# Patient Record
Sex: FEMALE | Race: WHITE | NOT HISPANIC OR LATINO | Employment: OTHER | URBAN - METROPOLITAN AREA
[De-identification: names, ages, dates, MRNs, and addresses within clinical notes are randomized per-mention and may not be internally consistent; named-entity substitution may affect disease eponyms.]

---

## 2017-11-01 ENCOUNTER — APPOINTMENT (OUTPATIENT)
Dept: WOMENS IMAGING | Facility: HOSPITAL | Age: 62
End: 2017-11-01

## 2017-11-01 PROCEDURE — 77067 SCR MAMMO BI INCL CAD: CPT | Performed by: RADIOLOGY

## 2018-12-07 ENCOUNTER — APPOINTMENT (OUTPATIENT)
Dept: WOMENS IMAGING | Facility: HOSPITAL | Age: 63
End: 2018-12-07

## 2018-12-07 PROCEDURE — 77067 SCR MAMMO BI INCL CAD: CPT | Performed by: RADIOLOGY

## 2020-06-02 ENCOUNTER — APPOINTMENT (OUTPATIENT)
Dept: WOMENS IMAGING | Facility: HOSPITAL | Age: 65
End: 2020-06-02

## 2020-06-02 PROCEDURE — 77067 SCR MAMMO BI INCL CAD: CPT | Performed by: RADIOLOGY

## 2020-12-17 ENCOUNTER — OFFICE VISIT (OUTPATIENT)
Dept: ENDOCRINOLOGY | Facility: CLINIC | Age: 65
End: 2020-12-17

## 2020-12-17 ENCOUNTER — TELEPHONE (OUTPATIENT)
Dept: ENDOCRINOLOGY | Facility: CLINIC | Age: 65
End: 2020-12-17

## 2020-12-17 ENCOUNTER — LAB (OUTPATIENT)
Dept: LAB | Facility: HOSPITAL | Age: 65
End: 2020-12-17

## 2020-12-17 VITALS
SYSTOLIC BLOOD PRESSURE: 110 MMHG | HEART RATE: 56 BPM | DIASTOLIC BLOOD PRESSURE: 64 MMHG | WEIGHT: 136.6 LBS | BODY MASS INDEX: 26.82 KG/M2 | HEIGHT: 60 IN

## 2020-12-17 DIAGNOSIS — E03.9 ACQUIRED HYPOTHYROIDISM: ICD-10-CM

## 2020-12-17 DIAGNOSIS — E11.649 HYPOGLYCEMIA ASSOCIATED WITH TYPE 2 DIABETES MELLITUS (HCC): ICD-10-CM

## 2020-12-17 DIAGNOSIS — IMO0002 DIABETES MELLITUS TYPE 2, UNCONTROLLED, WITH COMPLICATIONS: Primary | ICD-10-CM

## 2020-12-17 DIAGNOSIS — IMO0002 DIABETES MELLITUS TYPE 2, UNCONTROLLED, WITH COMPLICATIONS: ICD-10-CM

## 2020-12-17 PROBLEM — E78.00 HYPERCHOLESTEREMIA: Status: ACTIVE | Noted: 2020-12-17

## 2020-12-17 LAB
ALBUMIN SERPL-MCNC: 4.1 G/DL (ref 3.5–5.2)
ALBUMIN/GLOB SERPL: 1.4 G/DL
ALP SERPL-CCNC: 52 U/L (ref 39–117)
ALT SERPL W P-5'-P-CCNC: 13 U/L (ref 1–33)
ANION GAP SERPL CALCULATED.3IONS-SCNC: 8.7 MMOL/L (ref 5–15)
AST SERPL-CCNC: 16 U/L (ref 1–32)
BILIRUB SERPL-MCNC: 0.5 MG/DL (ref 0–1.2)
BUN SERPL-MCNC: 19 MG/DL (ref 8–23)
BUN/CREAT SERPL: 18.8 (ref 7–25)
CALCIUM SPEC-SCNC: 9.4 MG/DL (ref 8.6–10.5)
CHLORIDE SERPL-SCNC: 103 MMOL/L (ref 98–107)
CO2 SERPL-SCNC: 29.3 MMOL/L (ref 22–29)
CREAT SERPL-MCNC: 1.01 MG/DL (ref 0.57–1)
EXPIRATION DATE: NORMAL
GFR SERPL CREATININE-BSD FRML MDRD: 55 ML/MIN/1.73
GLOBULIN UR ELPH-MCNC: 2.9 GM/DL
GLUCOSE SERPL-MCNC: 169 MG/DL (ref 65–99)
HBA1C MFR BLD: 9.5 %
Lab: NORMAL
POTASSIUM SERPL-SCNC: 4.8 MMOL/L (ref 3.5–5.2)
PROT SERPL-MCNC: 7 G/DL (ref 6–8.5)
SODIUM SERPL-SCNC: 141 MMOL/L (ref 136–145)
TSH SERPL DL<=0.05 MIU/L-ACNC: 0.19 UIU/ML (ref 0.27–4.2)

## 2020-12-17 PROCEDURE — 82043 UR ALBUMIN QUANTITATIVE: CPT

## 2020-12-17 PROCEDURE — 83036 HEMOGLOBIN GLYCOSYLATED A1C: CPT | Performed by: INTERNAL MEDICINE

## 2020-12-17 PROCEDURE — 99214 OFFICE O/P EST MOD 30 MIN: CPT | Performed by: INTERNAL MEDICINE

## 2020-12-17 PROCEDURE — 80053 COMPREHEN METABOLIC PANEL: CPT

## 2020-12-17 PROCEDURE — 82570 ASSAY OF URINE CREATININE: CPT

## 2020-12-17 PROCEDURE — 84443 ASSAY THYROID STIM HORMONE: CPT

## 2020-12-17 RX ORDER — FLUCONAZOLE 150 MG/1
150 TABLET ORAL ONCE AS NEEDED
COMMUNITY
Start: 2020-12-08

## 2020-12-17 RX ORDER — BISOPROLOL FUMARATE 5 MG/1
TABLET, FILM COATED ORAL DAILY
COMMUNITY
Start: 2020-12-01 | End: 2021-10-18 | Stop reason: SDUPTHER

## 2020-12-17 RX ORDER — FENOFIBRATE 145 MG/1
145 TABLET, COATED ORAL DAILY
COMMUNITY
Start: 2020-12-08

## 2020-12-17 RX ORDER — ONDANSETRON 4 MG/1
4 TABLET, FILM COATED ORAL EVERY 8 HOURS PRN
COMMUNITY
End: 2022-02-24

## 2020-12-17 RX ORDER — RANOLAZINE 500 MG/1
TABLET, EXTENDED RELEASE ORAL 2 TIMES DAILY
COMMUNITY
Start: 2014-03-13 | End: 2021-10-18 | Stop reason: SDUPTHER

## 2020-12-17 RX ORDER — ATORVASTATIN CALCIUM 10 MG/1
10 TABLET, FILM COATED ORAL NIGHTLY
COMMUNITY
Start: 2020-10-17 | End: 2022-05-20 | Stop reason: SDUPTHER

## 2020-12-17 RX ORDER — PANTOPRAZOLE SODIUM 40 MG/1
40 TABLET, DELAYED RELEASE ORAL DAILY
COMMUNITY
Start: 2020-09-26 | End: 2022-02-15 | Stop reason: ALTCHOICE

## 2020-12-17 RX ORDER — GABAPENTIN 300 MG/1
CAPSULE ORAL 2 TIMES DAILY
COMMUNITY
Start: 2020-11-24 | End: 2021-10-18 | Stop reason: SDUPTHER

## 2020-12-17 RX ORDER — INSULIN GLARGINE 300 U/ML
14 INJECTION, SOLUTION SUBCUTANEOUS DAILY
COMMUNITY
End: 2020-12-17 | Stop reason: SDUPTHER

## 2020-12-17 RX ORDER — CETIRIZINE HYDROCHLORIDE 10 MG/1
10 TABLET ORAL DAILY
COMMUNITY
Start: 2014-03-13 | End: 2022-04-06

## 2020-12-17 RX ORDER — INSULIN GLARGINE 300 U/ML
14 INJECTION, SOLUTION SUBCUTANEOUS DAILY
Qty: 5 ML | Refills: 1 | Status: SHIPPED | OUTPATIENT
Start: 2020-12-17 | End: 2021-03-17 | Stop reason: SDUPTHER

## 2020-12-17 RX ORDER — FERROUS SULFATE 300 MG/5ML
300 LIQUID (ML) ORAL
COMMUNITY

## 2020-12-17 RX ORDER — LEVOTHYROXINE SODIUM 125 MCG
TABLET ORAL DAILY
COMMUNITY
Start: 2020-10-15 | End: 2021-03-17

## 2020-12-17 RX ORDER — EZETIMIBE 10 MG/1
10 TABLET ORAL DAILY
COMMUNITY
Start: 2020-10-17 | End: 2022-05-20 | Stop reason: SDUPTHER

## 2020-12-17 NOTE — TELEPHONE ENCOUNTER
Pt states we need to do a PA on toujeo.  I dont see a denial so we will send it again to get the denial to start process    Last visit today

## 2020-12-17 NOTE — ASSESSMENT & PLAN NOTE
A new problem since the addition of insulin. I dont want to reduce insulin since her a1c is still high but I just cannot increase her insulin

## 2020-12-17 NOTE — PROGRESS NOTES
"     Office Note      Date: 2020  Patient Name: Shelley Leach  MRN: [id number]  : 1955    Chief Complaint   Patient presents with   • Diabetes       History of Present Illness:   Shelley Leach is a 65 y.o. female who presents for Diabetes - type 2  Duration - 20 years  Severity- difficult to control  Context- started insulin last time and is now on 14 units   Last A1c:11.5  Associated conditions- high cholesterol and hypothyroidism on meds    Changes in health since last visit: none . Last eye exam  About due    She checkes bg -2  Times per day. She has occasional hypos.    Subjective              Review of Systems:   Review of Systems   Constitutional: Negative.    HENT: Positive for rhinorrhea.    Eyes: Positive for photophobia and itching.   Endocrine: Positive for cold intolerance.        Hair loss   Genitourinary: Positive for dyspareunia and frequency.   Musculoskeletal: Negative.    Skin: Negative.    Allergic/Immunologic: Negative.    Neurological: Negative.    Hematological: Negative.    Psychiatric/Behavioral: Negative.        The following portions of the patient's history were reviewed and updated as appropriate: allergies, current medications, past family history, past medical history, past social history, past surgical history and problem list.    Objective     Visit Vitals  /64 (BP Location: Right arm, Patient Position: Sitting, Cuff Size: Adult)   Pulse 56   Ht 152.4 cm (60\")   Wt 62 kg (136 lb 9.6 oz)   BMI 26.68 kg/m²       Labs:    CMP  Lab Results   Component Value Date    CREATININE 1.7 (H) 2015        CBC w/DIFF  Physical Exam:  Physical Exam  Constitutional:       Appearance: Normal appearance.   HENT:      Head: Normocephalic.   Neck:      Musculoskeletal: Normal range of motion and neck supple.   Lymphadenopathy:      Cervical: No cervical adenopathy.   Neurological:      General: No focal deficit present.      Mental Status: She is alert. Mental status is at " baseline.   Psychiatric:         Mood and Affect: Mood normal.         Thought Content: Thought content normal.         Judgment: Judgment normal.          Assessment / Plan      Assessment & Plan:  Problem List Items Addressed This Visit        Endocrine    Diabetes mellitus type 2, uncontrolled, with complications (CMS/[facility]) - Primary    Current Assessment & Plan      a1c is better but diabetes remains uncontrolled.  Will ask her to check bg after supper          Relevant Medications    Insulin Glargine, 1 Unit Dial, (Toujeo SoloStar) 300 UNIT/ML solution pen-injector injection    Other Relevant Orders    POC Glycosylated Hemoglobin (Hb A1C) (Completed)    Comprehensive Metabolic Panel    Microalbumin / Creatinine Urine Ratio - Urine, Clean Catch    Acquired hypothyroidism    Current Assessment & Plan     Clinically euthyroid. Due for tsh          Relevant Medications    bisoprolol (ZEBeta) 10 MG tablet    Synthroid 125 MCG tablet    Other Relevant Orders    TSH    Hypoglycemia associated with type 2 diabetes mellitus (CMS/[facility])    Current Assessment & Plan     A new problem since the addition of insulin. I dont want to reduce insulin since her a1c is still high but I just cannot increase her insulin          Relevant Medications    Insulin Glargine, 1 Unit Dial, (Toujeo SoloStar) 300 UNIT/ML solution pen-injector injection           Sonu Talbot MD   12/17/2020

## 2020-12-18 LAB
ALBUMIN UR-MCNC: <1.2 MG/DL
CREAT UR-MCNC: 63.7 MG/DL
MICROALBUMIN/CREAT UR: NORMAL MG/G{CREAT}

## 2021-03-17 ENCOUNTER — OFFICE VISIT (OUTPATIENT)
Dept: ENDOCRINOLOGY | Facility: CLINIC | Age: 66
End: 2021-03-17

## 2021-03-17 VITALS
DIASTOLIC BLOOD PRESSURE: 60 MMHG | SYSTOLIC BLOOD PRESSURE: 142 MMHG | TEMPERATURE: 97.5 F | WEIGHT: 142.4 LBS | BODY MASS INDEX: 27.96 KG/M2 | HEIGHT: 60 IN

## 2021-03-17 DIAGNOSIS — E11.649 HYPOGLYCEMIA ASSOCIATED WITH TYPE 2 DIABETES MELLITUS (HCC): Primary | ICD-10-CM

## 2021-03-17 DIAGNOSIS — E03.9 ACQUIRED HYPOTHYROIDISM: ICD-10-CM

## 2021-03-17 PROBLEM — IMO0002 DIABETES MELLITUS TYPE 2, UNCONTROLLED, WITH COMPLICATIONS: Status: RESOLVED | Noted: 2020-12-17 | Resolved: 2021-03-17

## 2021-03-17 LAB
EXPIRATION DATE: NORMAL
HBA1C MFR BLD: 9.4 %
Lab: NORMAL

## 2021-03-17 PROCEDURE — 83036 HEMOGLOBIN GLYCOSYLATED A1C: CPT | Performed by: INTERNAL MEDICINE

## 2021-03-17 PROCEDURE — 99213 OFFICE O/P EST LOW 20 MIN: CPT | Performed by: INTERNAL MEDICINE

## 2021-03-17 RX ORDER — INSULIN GLARGINE 300 U/ML
14 INJECTION, SOLUTION SUBCUTANEOUS DAILY
Qty: 5 ML | Refills: 1 | Status: SHIPPED | OUTPATIENT
Start: 2021-03-17 | End: 2022-02-15

## 2021-03-17 RX ORDER — DAPAGLIFLOZIN 10 MG/1
1 TABLET, FILM COATED ORAL DAILY
Qty: 90 TABLET | Refills: 3 | Status: SHIPPED | OUTPATIENT
Start: 2021-03-17 | End: 2021-10-18

## 2021-03-17 RX ORDER — LEVOTHYROXINE SODIUM 100 MCG
100 TABLET ORAL DAILY
COMMUNITY
Start: 2021-03-16 | End: 2022-04-14

## 2021-03-17 RX ORDER — ISOPROPYL ALCOHOL 0.7 ML/1
SWAB TOPICAL AS NEEDED
COMMUNITY
End: 2022-09-09

## 2021-03-17 NOTE — ASSESSMENT & PLAN NOTE
Fast bg is low.  a1c is the same. We talked about options and will add farxiga.  She  Has done ok with it in past

## 2021-03-17 NOTE — PROGRESS NOTES
"     Office Note      Date: 2021  Patient Name: Shelley Leach  MRN: [id number]  : 1955    Chief Complaint   Patient presents with   • Follow-up   • Diabetes       History of Present Illness:   Shelley Leach is a 66 y.o. female who presents for Diabetes - type 2. She is on 14 units of toujeo and her daily fasting bg readings have been low or normal.  Higher readings later in the day  Last creatinine was 1.01  Last A1c:9.5  Hemoglobin A1C   Date Value Ref Range Status   2021 9.4 % Final       Changes in health since last visit: lots of family stress . Last eye exam up to date  Full labs are up to date  .    Subjective        Review of Systems:   Review of Systems   Constitutional: Negative.    HENT: Negative.    Eyes: Negative.    Respiratory: Negative.    Psychiatric/Behavioral: Positive for dysphoric mood.   All other systems reviewed and are negative.      The following portions of the patient's history were reviewed and updated as appropriate: allergies, current medications, past family history, past medical history, past social history, past surgical history and problem list.    Objective     Visit Vitals  /60   Temp 97.5 °F (36.4 °C) (Infrared)   Ht 152.4 cm (60\")   Wt 64.6 kg (142 lb 6.4 oz)   BMI 27.81 kg/m²       Labs:    CMP  Lab Results   Component Value Date    GLUCOSE 169 (H) 2020    BUN 19 2020    CREATININE 1.01 (H) 2020    EGFRIFNONA 55 (L) 2020    BCR 18.8 2020    K 4.8 2020    CO2 29.3 (H) 2020    CALCIUM 9.4 2020    AST 16 2020    ALT 13 2020        CBC w/DIFF  No results found for: WBC, RBC, HGB, HCT, MCV, MCH, MCHC, RDW, RDWSD, MPV, PLT, NEUTRORELPCT, LYMPHORELPCT, MONORELPCT, EOSRELPCT, BASORELPCT, AUTOIGPER, NEUTROABS, LYMPHSABS, MONOSABS, EOSABS, BASOSABS, AUTOIGNUM, NRBC    Physical Exam:  Physical Exam  Vitals reviewed.   Constitutional:       Appearance: Normal appearance.   Neurological:      Mental " Status: She is alert and oriented to person, place, and time.   Psychiatric:         Mood and Affect: Mood normal.         Thought Content: Thought content normal.         Judgment: Judgment normal.          Assessment / Plan      Assessment & Plan:  Problem List Items Addressed This Visit        Other    Acquired hypothyroidism    Current Assessment & Plan     Due for levles after last time's dose  Change- we will call for the labs from dr pacheco          Relevant Medications    bisoprolol (ZEBeta) 5 MG tablet    Synthroid 100 MCG tablet    Hypoglycemia associated with type 2 diabetes mellitus (CMS/[facility]) - Primary    Current Assessment & Plan      Fast bg is low.  a1c is the same. We talked about options and will add farxiga.  She  Has done ok with it in past          Relevant Medications    Dapagliflozin Propanediol (Farxiga) 10 MG tablet    Insulin Glargine, 1 Unit Dial, (Toujeo SoloStar) 300 UNIT/ML solution pen-injector injection    Other Relevant Orders    POC Glycosylated Hemoglobin (Hb A1C) (Completed)           Sonu Talbot MD   03/17/2021

## 2021-03-18 ENCOUNTER — TELEPHONE (OUTPATIENT)
Dept: ENDOCRINOLOGY | Facility: CLINIC | Age: 66
End: 2021-03-18

## 2021-03-18 NOTE — TELEPHONE ENCOUNTER
Message from Plan  Your PA request has been approved. Additional information will be provided in the approval communication.

## 2021-03-22 ENCOUNTER — TELEPHONE (OUTPATIENT)
Dept: ENDOCRINOLOGY | Facility: CLINIC | Age: 66
End: 2021-03-22

## 2021-03-22 NOTE — TELEPHONE ENCOUNTER
Spoke with patient. She states that she does not need a prescription at this time and will call back when it is time for refill.

## 2021-04-01 ENCOUNTER — TELEPHONE (OUTPATIENT)
Dept: ENDOCRINOLOGY | Facility: CLINIC | Age: 66
End: 2021-04-01

## 2021-04-01 NOTE — TELEPHONE ENCOUNTER
Spoke to pt- she voiced understanding.  Did you want her to get labs checked after change?  Her f/u appt is 7/19/21

## 2021-07-12 ENCOUNTER — OFFICE VISIT (OUTPATIENT)
Dept: CARDIAC SURGERY | Facility: CLINIC | Age: 66
End: 2021-07-12

## 2021-07-12 VITALS
HEART RATE: 52 BPM | HEIGHT: 60 IN | BODY MASS INDEX: 27.29 KG/M2 | OXYGEN SATURATION: 98 % | SYSTOLIC BLOOD PRESSURE: 112 MMHG | TEMPERATURE: 97.8 F | WEIGHT: 139 LBS | DIASTOLIC BLOOD PRESSURE: 65 MMHG

## 2021-07-12 DIAGNOSIS — I65.23 BILATERAL CAROTID ARTERY STENOSIS: Primary | ICD-10-CM

## 2021-07-12 DIAGNOSIS — R42 DIZZINESS: Primary | ICD-10-CM

## 2021-07-12 PROCEDURE — 99204 OFFICE O/P NEW MOD 45 MIN: CPT | Performed by: THORACIC SURGERY (CARDIOTHORACIC VASCULAR SURGERY)

## 2021-07-12 RX ORDER — MULTIVIT WITH MINERALS/LUTEIN
250 TABLET ORAL DAILY
COMMUNITY

## 2021-07-12 RX ORDER — LANOLIN ALCOHOL/MO/W.PET/CERES
1000 CREAM (GRAM) TOPICAL DAILY
COMMUNITY
End: 2021-12-21

## 2021-07-12 RX ORDER — ASPIRIN 81 MG/1
81 TABLET, CHEWABLE ORAL DAILY
COMMUNITY
End: 2021-10-18 | Stop reason: SDUPTHER

## 2021-07-12 NOTE — PROGRESS NOTES
07/12/2021  Patient Information  [facility]                                                                                          [address] [place] [address]   1955  'PCP/Referring Physician'  Tom Salas MD  [phone]  No ref. provider found    Chief Complaint   Patient presents with   • Consult     Former pt of ROM, returning per Dr. Salas for severe Left carotid stenosis. Pt states that she is having bilateral blurry vision, pain in the upper left side of the chest and neck that radiates across into the left shoulder.  Pt has been feeling dizzy and gets very SOB easily, exertion makes it worse, feels like she is going to faint.  Pain in left lower calf is a dull ache and left foot feels numb.        History of Present Illness:   The patient is a 66-year-old female who was referred to me to evaluate carotid artery stenosis.  She has some complaints of, bilaterally, blurred vision and a carotid duplex scan demonstrated an occluded left internal carotid artery with 50 to 69% stenosis in the right internal carotid artery. However, I cannot demonstrate true signs of a stroke.  She does have a history of diabetes and has never been a smoker.  She does have chronic complaints of left chest pain, but I cannot see that she has been evaluated by cardiology.    Patient Active Problem List   Diagnosis   • Acquired hypothyroidism   • Hypercholesteremia   • Hypoglycemia associated with type 2 diabetes mellitus (CMS/HCC)   • Bilateral carotid artery stenosis     Past Medical History:   Diagnosis Date   • Dietary counseling     diet education-Abstracted from vicki   • Dietary counseling and surveillance    • History of stroke    • History of thyroid disorder     Thyroid problem-Abstracted from vicki   • Type 2 diabetes mellitus, uncontrolled (CMS/HCC)      Past Surgical History:   Procedure Laterality Date   • BLEPHAROPLASTY     • CARPAL TUNNEL RELEASE Bilateral    • CHOLECYSTECTOMY     •  GASTRIC BYPASS     • HYSTERECTOMY     • OVARIAN CYST SURGERY      x 2   • ROTATOR CUFF REPAIR Left    • THYROIDECTOMY, PARTIAL     • TONSILLECTOMY         Current Outpatient Medications:   •  Alcohol Swabs (Alcohol Wipes) 70 % pads, As Needed., Disp: , Rfl:   •  aspirin 81 MG chewable tablet, Chew 81 mg Daily., Disp: , Rfl:   •  atorvastatin (LIPITOR) 10 MG tablet, Daily., Disp: , Rfl:   •  Biotin 55697 MCG tablet dispersible, Place  on the tongue 2 (two) times a day., Disp: , Rfl:   •  bisoprolol (ZEBeta) 5 MG tablet, Take 5 mg by mouth Daily., Disp: , Rfl:   •  Calcium Polycarbophil (FIBER-CAPS PO), Take  by mouth Daily. 2 tablets daily, Disp: , Rfl:   •  cetirizine (ZyrTEC Allergy) 10 MG tablet, Take  by mouth Daily., Disp: , Rfl:   •  Dapagliflozin Propanediol (Farxiga) 10 MG tablet, Take 10 mg by mouth Daily., Disp: 90 tablet, Rfl: 3  •  ezetimibe (ZETIA) 10 MG tablet, Daily., Disp: , Rfl:   •  fenofibrate (TRICOR) 145 MG tablet, Daily., Disp: , Rfl:   •  ferrous sulfate 324 (65 Fe) MG tablet delayed-release EC tablet, Take 324 mg by mouth Daily With Breakfast., Disp: , Rfl:   •  fluconazole (DIFLUCAN) 150 MG tablet, As needed, Disp: , Rfl:   •  gabapentin (NEURONTIN) 300 MG capsule, 2 (two) times a day., Disp: , Rfl:   •  glucose blood test strip, Daily., Disp: , Rfl:   •  Insulin Glargine, 1 Unit Dial, (Toujeo SoloStar) 300 UNIT/ML solution pen-injector injection, Inject 14 Units under the skin into the appropriate area as directed Daily., Disp: 5 mL, Rfl: 1  •  Insulin Pen Needle 32G X 4 MM misc, Daily., Disp: , Rfl:   •  Mirabegron ER (Myrbetriq) 25 MG tablet sustained-release 24 hour 24 hr tablet, Daily., Disp: , Rfl:   •  Multiple Vitamins-Minerals (BARIATRIC FUSION PO), Take  by mouth 2 (two) times a day., Disp: , Rfl:   •  Multiple Vitamins-Minerals (BARIATRIC FUSION PO), Take  by mouth Daily. Hair skin and nails, Disp: , Rfl:   •  ondansetron (ZOFRAN) 4 MG tablet, As needed, Disp: , Rfl:   •   pantoprazole (PROTONIX) 40 MG EC tablet, Daily., Disp: , Rfl:   •  Probiotic Product (TRUBIOTICS PO), Take  by mouth Daily., Disp: , Rfl:   •  ranolazine (Ranexa) 500 MG 12 hr tablet, 2 (two) times a day., Disp: , Rfl:   •  sertraline (ZOLOFT) 50 MG tablet, Daily., Disp: , Rfl:   •  Synthroid 100 MCG tablet, 1 mcg Daily., Disp: , Rfl:   •  vitamin B-12 (CYANOCOBALAMIN) 1000 MCG tablet, Take 1,000 mcg by mouth Daily., Disp: , Rfl:   •  vitamin C (ASCORBIC ACID) 250 MG tablet, Take 250 mg by mouth Daily., Disp: , Rfl:   •  vitamin D3 125 MCG (5000 UT) capsule capsule, Take 5,000 Units by mouth Daily. 2 tablets daioly, Disp: , Rfl:   •  estrogens, conjugated, (Premarin) 0.3 MG tablet, Daily., Disp: , Rfl:   Allergies   Allergen Reactions   • Percocet [Oxycodone-Acetaminophen] Itching   • Morphine Other (See Comments)   • Sulfamethoxazole-Trimethoprim Swelling   • Penicillins Rash     Social History     Socioeconomic History   • Marital status:      Spouse name: Not on file   • Number of children: Not on file   • Years of education: Not on file   • Highest education level: Not on file   Tobacco Use   • Smoking status: Never Smoker   • Smokeless tobacco: Never Used   Vaping Use   • Vaping Use: Never used   Substance and Sexual Activity   • Alcohol use: Never   • Drug use: Never   • Sexual activity: Defer     Family History   Problem Relation Age of Onset   • Diabetes Mother    • Diabetes Father    • Hypertension Father    • Thyroid disease Father    • Kidney disease Father      Review of Systems   Constitutional: Positive for malaise/fatigue and night sweats. Negative for chills, decreased appetite, fever, weight gain and weight loss.   HENT: Negative for hearing loss.    Eyes: Positive for blurred vision (bilateral blurry vision ) and visual halos (right eye ).   Cardiovascular: Positive for chest pain (upper left chest pain pain radiates iwona the left shoulder) and dyspnea on exertion. Negative for claudication,  "cyanosis, irregular heartbeat, leg swelling, near-syncope, orthopnea, palpitations, paroxysmal nocturnal dyspnea and syncope.   Endocrine: Negative for polydipsia, polyphagia and polyuria.   Hematologic/Lymphatic: Negative for adenopathy. Does not bruise/bleed easily.   Musculoskeletal: Positive for muscle cramps (left leg dull ache). Negative for arthritis, falls, gout, joint pain, joint swelling, muscle weakness and myalgias.   Gastrointestinal: Positive for nausea. Negative for abdominal pain, anorexia, dysphagia, heartburn and vomiting.   Genitourinary: Negative for dysuria and hematuria.   Neurological: Positive for dizziness (after sitting from rising and walking a few feet must stop and sit down  pt feels like she will faint), numbness (left foot feels numb ) and weakness. Negative for focal weakness, headaches, loss of balance, seizures and vertigo.   Psychiatric/Behavioral: Negative for altered mental status, depression, substance abuse and suicidal ideas. The patient has insomnia. The patient is not nervous/anxious.    Allergic/Immunologic: Positive for environmental allergies. Negative for HIV exposure and persistent infections.     Vitals:    07/12/21 0917   BP: 112/65   Pulse: 52   Temp: 97.8 °F (36.6 °C)   SpO2: 98%   Weight: 63 kg (139 lb)   Height: 152.4 cm (60\")      Physical Exam   CONSTITUTIONAL: Alert and conversant, Well dressed, Well nourished, No acute distress  EYES: Sclera clean, Anicteric, Pupils equal  ENT: No nasal deviation, Trachea midline  NECK: No neck masses, Supple  LUNGS: No wheezing, Cough, non-congested  HEART: No rubs, No murmurs  GASTROINTESTINAL: Soft, non-distended, No masses, Non tender  to palpation, normal bowel sounds  NEURO: No motor deficits, No sensory deficits, Cranial Nerves 2 through 12 grossly intact  PSYCHIATRIC: Oriented to person, place and time, No memory deficits, Mood appropriate  VASCULAR: No carotid bruits, Femoral pulses palpable and " symmetric  MUSKULOSKELETAL: No extremity trauma or extremity asymmetry    The ROS, past medical history, surgical history, family history, social history and vitals were reviewed by myself and corrected as needed.      Labs/Imaging:  I reviewed the duplex scan demonstrating an occluded left carotid.    Assessment/Plan:   The patient is a 66-year-old female who is being referred for an occluded left carotid artery.  This left-sided artery does not require surgery, as once a carotid artery has occluded there is no indication to proceed with surgery.  However, the contralateral side is now supplying her entire brain with blood flow and at 70% or worse, surgical intervention should be considered.  A current duplex scan demonstrates 50 to 69% narrowing and I will obtain a CT angiogram with 3D reconstruction at [facility] to evaluate the right sided carotid artery.  If indeed it is 70% or worse I would recommend surgical intervention, but I do believe this patient may need to see cardiology if surgery is needed because of her vague complaints of chest pain and history of diabetes.  She is agreeable to that plan.    Patient Active Problem List   Diagnosis   • Acquired hypothyroidism   • Hypercholesteremia   • Hypoglycemia associated with type 2 diabetes mellitus (CMS/[facility])   • Bilateral carotid artery stenosis       CC: MD Steffany Charles editing for Javed Morris MD    I, Javed Morris MD, have read and agree with the editing done by Steffany Queen, .

## 2021-07-19 ENCOUNTER — OFFICE VISIT (OUTPATIENT)
Dept: ENDOCRINOLOGY | Facility: CLINIC | Age: 66
End: 2021-07-19

## 2021-07-19 VITALS
HEART RATE: 60 BPM | SYSTOLIC BLOOD PRESSURE: 100 MMHG | BODY MASS INDEX: 26.9 KG/M2 | HEIGHT: 60 IN | WEIGHT: 137 LBS | DIASTOLIC BLOOD PRESSURE: 60 MMHG

## 2021-07-19 DIAGNOSIS — E11.649 HYPOGLYCEMIA ASSOCIATED WITH TYPE 2 DIABETES MELLITUS (HCC): Primary | ICD-10-CM

## 2021-07-19 LAB
EXPIRATION DATE: NORMAL
EXPIRATION DATE: NORMAL
GLUCOSE BLDC GLUCOMTR-MCNC: 95 MG/DL (ref 70–130)
HBA1C MFR BLD: 9.5 %
Lab: NORMAL
Lab: NORMAL

## 2021-07-19 PROCEDURE — 99213 OFFICE O/P EST LOW 20 MIN: CPT | Performed by: INTERNAL MEDICINE

## 2021-07-19 PROCEDURE — 83036 HEMOGLOBIN GLYCOSYLATED A1C: CPT | Performed by: INTERNAL MEDICINE

## 2021-07-19 PROCEDURE — 82947 ASSAY GLUCOSE BLOOD QUANT: CPT | Performed by: INTERNAL MEDICINE

## 2021-07-19 RX ORDER — VENLAFAXINE 37.5 MG/1
37.5 TABLET ORAL 2 TIMES DAILY
COMMUNITY
End: 2022-04-14

## 2021-07-19 NOTE — ASSESSMENT & PLAN NOTE
A1C IS STUCK AT 9.5  UNCHANGED AND NOT AT GOAL.  ALMOST CERTAINLY WILL NEED MEALTIME INSULIN. TO BE SURE, I HAVE ASKED HER TO CHECK HER BG 2 HOURS AFTER SUPPER FOR THE NEXT 3 DAYS AND THEN LET ME KNOW WHAT THOSE NUMBERS ARE.  IF THEY ARE HIGH, WE WILL START BY ADDING 5 UNITS OF LOG WITH SUPPER

## 2021-07-19 NOTE — PROGRESS NOTES
"     Office Note      Date: 2021  Patient Name: Shelley Leach  MRN: [id number]  : 1955    Chief Complaint   Patient presents with   • Diabetes       History of Present Illness:   Shelley Leach is a 66 y.o. female who presents for Diabetes - TYPE 2. ON INSULIN AND FARXIGA.  SHE TOLERATES THE LATTER WHICH I ADDED LAST TIME  EGFR WAS 43  BG CHECKS ARE DONE DAILY  SHE HAS HAD A FEW LOWS IN THE 60'S     Last A1c:  Hemoglobin A1C   Date Value Ref Range Status   2021 9.4 % Final       Changes in health since last visit: NONE . Last eye exam DUE  LIPIDS WERE DONE LAST WEEK.    Subjective          Review of Systems:   Review of Systems   Constitutional: Negative.    HENT: Negative.    Respiratory: Negative.        The following portions of the patient's history were reviewed and updated as appropriate: allergies, current medications, past family history, past medical history, past social history, past surgical history and problem list.    Objective     Visit Vitals  /60   Pulse 60   Ht 152.4 cm (60\")   Wt 62.1 kg (137 lb)   BMI 26.76 kg/m²       Labs:    CMP  Lab Results   Component Value Date    GLUCOSE 169 (H) 2020    BUN 19 2020    CREATININE 1.01 (H) 2020    EGFRIFNONA 55 (L) 2020    BCR 18.8 2020    K 4.8 2020    CO2 29.3 (H) 2020    CALCIUM 9.4 2020    AST 16 2020    ALT 13 2020        CBC w/DIFF  No results found for: WBC, RBC, HGB, HCT, MCV, MCH, MCHC, RDW, RDWSD, MPV, PLT, NEUTRORELPCT, LYMPHORELPCT, MONORELPCT, EOSRELPCT, BASORELPCT, AUTOIGPER, NEUTROABS, LYMPHSABS, MONOSABS, EOSABS, BASOSABS, AUTOIGNUM, NRBC    Physical Exam:  Physical Exam  Constitutional:       Appearance: Normal appearance.   Cardiovascular:      Pulses:           Dorsalis pedis pulses are 1+ on the right side and 1+ on the left side.        Posterior tibial pulses are 1+ on the right side and 1+ on the left side.   Musculoskeletal:      Right foot: " Normal range of motion. No deformity, bunion, Charcot foot, foot drop or prominent metatarsal heads.      Left foot: Normal range of motion. No deformity, bunion, Charcot foot, foot drop or prominent metatarsal heads.        Feet:    Feet:      Right foot:      Protective Sensation: 10 sites tested. 9 sites sensed.      Skin integrity: Skin integrity normal.      Toenail Condition: Right toenails are normal.      Left foot:      Protective Sensation: 10 sites tested. 10 sites sensed.      Skin integrity: Skin integrity normal.      Toenail Condition: Left toenails are normal.      Comments: NUMBNESS RIGHT GREAT TOE    Diabetic Foot Exam Performed and Monofilament Test Performed  Neurological:      Mental Status: She is alert.          Assessment / Plan      Assessment & Plan:  Problem List Items Addressed This Visit        Other    Hypoglycemia associated with type 2 diabetes mellitus (CMS/[facility]) - Primary    Current Assessment & Plan     A1C IS STUCK AT 9.5  UNCHANGED AND NOT AT GOAL.  ALMOST CERTAINLY WILL NEED MEALTIME INSULIN. TO BE SURE, I HAVE ASKED HER TO CHECK HER BG 2 HOURS AFTER SUPPER FOR THE NEXT 3 DAYS AND THEN LET ME KNOW WHAT THOSE NUMBERS ARE.  IF THEY ARE HIGH, WE WILL START BY ADDING 5 UNITS OF LOG WITH SUPPER          Relevant Medications    Dapagliflozin Propanediol (Farxiga) 10 MG tablet    Insulin Glargine, 1 Unit Dial, (Toujeo SoloStar) 300 UNIT/ML solution pen-injector injection    Other Relevant Orders    POC Glycosylated Hemoglobin (Hb A1C)    POC Glucose, Blood           Sonu Talbot MD   07/19/2021

## 2021-07-21 ENCOUNTER — TELEPHONE (OUTPATIENT)
Dept: CARDIAC SURGERY | Facility: CLINIC | Age: 66
End: 2021-07-21

## 2021-07-21 NOTE — TELEPHONE ENCOUNTER
Pt was unable to have CTA Done due to abnormal Creatinine & GFR- nurses note place on Dr. Arturo dodson for instructions.

## 2021-07-29 RX ORDER — INSULIN ASPART 100 [IU]/ML
INJECTION, SOLUTION INTRAVENOUS; SUBCUTANEOUS
Qty: 15 ML | Refills: 5 | Status: SHIPPED | OUTPATIENT
Start: 2021-07-29 | End: 2021-10-18

## 2021-08-11 ENCOUNTER — OFFICE VISIT (OUTPATIENT)
Dept: NEUROSURGERY | Facility: CLINIC | Age: 66
End: 2021-08-11

## 2021-08-11 ENCOUNTER — PREP FOR SURGERY (OUTPATIENT)
Dept: OTHER | Facility: HOSPITAL | Age: 66
End: 2021-08-11

## 2021-08-11 VITALS
WEIGHT: 137 LBS | SYSTOLIC BLOOD PRESSURE: 108 MMHG | DIASTOLIC BLOOD PRESSURE: 58 MMHG | TEMPERATURE: 97.6 F | HEIGHT: 60 IN | BODY MASS INDEX: 26.9 KG/M2

## 2021-08-11 DIAGNOSIS — I65.21 CAROTID STENOSIS, ASYMPTOMATIC, RIGHT: Primary | ICD-10-CM

## 2021-08-11 PROCEDURE — 99204 OFFICE O/P NEW MOD 45 MIN: CPT | Performed by: RADIOLOGY

## 2021-08-11 RX ORDER — SODIUM CHLORIDE 9 MG/ML
100 INJECTION, SOLUTION INTRAVENOUS CONTINUOUS
Status: CANCELLED | OUTPATIENT
Start: 2021-08-11

## 2021-08-11 RX ORDER — SODIUM CHLORIDE 0.9 % (FLUSH) 0.9 %
1-10 SYRINGE (ML) INJECTION AS NEEDED
Status: CANCELLED | OUTPATIENT
Start: 2021-08-11

## 2021-08-11 RX ORDER — SODIUM CHLORIDE 0.9 % (FLUSH) 0.9 %
10 SYRINGE (ML) INJECTION EVERY 12 HOURS SCHEDULED
Status: CANCELLED | OUTPATIENT
Start: 2021-08-11

## 2021-08-11 NOTE — PROGRESS NOTES
NAME: LAUREN URIAS   DOS: 2021  : 1955  PCP: Tom Salas MD    Chief Complaint:    Chief Complaint   Patient presents with   • Carotid artery stenosis       History of Present Illness:  66 y.o. female with dizziness and carotid duplex demonstrating incidental occlusion of the left ICA and indeterminate severity right carotid stenosis.  She denies any prior stroke or TIA-like symptoms.  Specifically, no unilateral weakness/numbness, no speech or vision changes.  She is on chronic aspirin and statin therapy.  She is a diabetic, with most recent hemoglobin A1c greater than 9 (per patient).  I am seeing her in consultation regarding her asymptomatic carotid occlusive disease.    Past Medical History:  Past Medical History:   Diagnosis Date   • Dietary counseling     diet education-Abstracted from [facility]   • Dietary counseling and surveillance    • History of stroke    • History of thyroid disorder     Thyroid problem-Abstracted from [facility]   • Hypothyroidism    • Type 2 diabetes mellitus, uncontrolled (CMS/[facility])        Past Surgical History:  Past Surgical History:   Procedure Laterality Date   • BLEPHAROPLASTY     • CARPAL TUNNEL RELEASE Bilateral    • CHOLECYSTECTOMY     • GASTRIC BYPASS     • HYSTERECTOMY     • OVARIAN CYST SURGERY      x 2   • ROTATOR CUFF REPAIR Left    • THYROIDECTOMY, PARTIAL     • TONSILLECTOMY         Review of Systems:        Review of Systems   Constitutional: Positive for fatigue.   HENT: Positive for mouth sores and postnasal drip.    Eyes: Positive for itching and visual disturbance.   Cardiovascular: Positive for palpitations.   Genitourinary: Positive for frequency and urgency.   Neurological: Positive for dizziness, syncope, weakness and light-headedness.   All other systems reviewed and are negative.       Medications    Current Outpatient Medications:   •  Alcohol Swabs (Alcohol Wipes) 70 % pads, As Needed., Disp: , Rfl:   •  aspirin 81 MG chewable tablet, Chew 81  mg Daily., Disp: , Rfl:   •  atorvastatin (LIPITOR) 10 MG tablet, Daily., Disp: , Rfl:   •  Biotin 82339 MCG tablet dispersible, Place  on the tongue 2 (two) times a day., Disp: , Rfl:   •  bisoprolol (ZEBeta) 5 MG tablet, Take  by mouth Daily. 1/2 TABLET DAILY, Disp: , Rfl:   •  Calcium Polycarbophil (FIBER-CAPS PO), Take  by mouth Daily. 2 tablets daily, Disp: , Rfl:   •  cetirizine (ZyrTEC Allergy) 10 MG tablet, Take  by mouth Daily., Disp: , Rfl:   •  Dapagliflozin Propanediol (Farxiga) 10 MG tablet, Take 10 mg by mouth Daily., Disp: 90 tablet, Rfl: 3  •  estrogens, conjugated, (Premarin) 0.3 MG tablet, Daily., Disp: , Rfl:   •  ezetimibe (ZETIA) 10 MG tablet, Daily., Disp: , Rfl:   •  fenofibrate (TRICOR) 145 MG tablet, Daily., Disp: , Rfl:   •  ferrous sulfate 324 (65 Fe) MG tablet delayed-release EC tablet, Take 324 mg by mouth Daily With Breakfast., Disp: , Rfl:   •  fluconazole (DIFLUCAN) 150 MG tablet, As needed, Disp: , Rfl:   •  gabapentin (NEURONTIN) 300 MG capsule, 2 (two) times a day., Disp: , Rfl:   •  glucose blood test strip, Daily., Disp: , Rfl:   •  insulin aspart (NovoLOG FlexPen) 100 UNIT/ML solution pen-injector sc pen, 5 units daily at supper, Disp: 15 mL, Rfl: 5  •  Insulin Glargine, 1 Unit Dial, (Toujeo SoloStar) 300 UNIT/ML solution pen-injector injection, Inject 14 Units under the skin into the appropriate area as directed Daily., Disp: 5 mL, Rfl: 1  •  Insulin Pen Needle 32G X 4 MM misc, Daily., Disp: , Rfl:   •  Mirabegron ER (Myrbetriq) 25 MG tablet sustained-release 24 hour 24 hr tablet, Daily., Disp: , Rfl:   •  Multiple Vitamins-Minerals (BARIATRIC FUSION PO), Take  by mouth 2 (two) times a day., Disp: , Rfl:   •  Multiple Vitamins-Minerals (BARIATRIC FUSION PO), Take  by mouth Daily. Hair skin and nails, Disp: , Rfl:   •  ondansetron (ZOFRAN) 4 MG tablet, As needed, Disp: , Rfl:   •  pantoprazole (PROTONIX) 40 MG EC tablet, Daily., Disp: , Rfl:   •  Probiotic Product (TRUBIOTICS  PO), Take  by mouth Daily., Disp: , Rfl:   •  ranolazine (Ranexa) 500 MG 12 hr tablet, 2 (two) times a day., Disp: , Rfl:   •  sertraline (ZOLOFT) 50 MG tablet, 25 mg Daily. 1/2 TABLET DAILY, Disp: , Rfl:   •  Synthroid 100 MCG tablet, 100 mcg Daily., Disp: , Rfl:   •  venlafaxine (EFFEXOR) 37.5 MG tablet, Take 37.5 mg by mouth 2 (Two) Times a Day., Disp: , Rfl:   •  vitamin B-12 (CYANOCOBALAMIN) 1000 MCG tablet, Take 1,000 mcg by mouth Daily., Disp: , Rfl:   •  vitamin C (ASCORBIC ACID) 250 MG tablet, Take 250 mg by mouth Daily., Disp: , Rfl:   •  vitamin D3 125 MCG (5000 UT) capsule capsule, Take 5,000 Units by mouth Daily. 2 tablets daioly, Disp: , Rfl:     Allergies:  Allergies   Allergen Reactions   • Percocet [Oxycodone-Acetaminophen] Itching   • Morphine Other (See Comments)   • Sulfamethoxazole-Trimethoprim Swelling   • Penicillins Rash       Social Hx:  Social History     Tobacco Use   • Smoking status: Never Smoker   • Smokeless tobacco: Never Used   Vaping Use   • Vaping Use: Never used   Substance Use Topics   • Alcohol use: Never   • Drug use: Never       Family Hx:  Family History   Problem Relation Age of Onset   • Diabetes Mother    • Diabetes Father    • Hypertension Father    • Thyroid disease Father    • Kidney disease Father        Review of Imaging:  Report of carotid duplex from [facility] dated 6/25/2021 was reviewed along with its corresponding radiologic report.  This study reports occlusion of the cervical left ICA.  The right carotid has peak velocities of 188 cm/s, with a ratio of 1.8, but the radiology interpretation describes a 50-69% stenosis.    Physical Examination:  Vitals:    08/11/21 1434   BP: 108/58   Temp: 97.6 °F (36.4 °C)        General Appearance:   Well developed, well nourished, well groomed, alert, and cooperative.  Cardiovascular: Regular rate and rhythm. No carotid bruits      Neurological examination:  Neurologic Exam     Mental Status   Oriented  "to person, place, and time.   Speech: speech is normal   Level of consciousness: alert    Cranial Nerves   Cranial nerves II through XII intact.     Motor Exam     Strength   Strength 5/5 throughout.     Sensory Exam   Light touch normal.     Gait, Coordination, and Reflexes     Gait  Gait: normal      Diagnoses/Plan:    Ms. Leach is a 66 y.o. female incidental left carotid occlusion and indeterminate severity right carotid stenosis found on a carotid duplex.  She denies any stroke or TIA-like symptoms.  She was seen by my CT surgery colleague, Dr. Everett Morris, who had recommended a CT angiogram for further interrogation of her right carotid stenosis, and to confirm a left carotid occlusion.  However, her renal function was \"borderline\", and thus she could not have the CT angiogram.  I discussed these findings with her, and I think it is reasonable to perform a diagnostic catheter angiogram (via right radial access), as we would be able to definitively evaluate her carotid occlusive disease with probable 20-30 mL of contrast which would be extremely low risk for worsening renal function.  Ms. Leach and family are agreeable to this, and wished to pursue a diagnostic angiography as soon as possible.  We will tentatively schedule her for diagnostic angiogram in the next couple of weeks or so, deferring any further intervention pending results of that study.                  "

## 2021-08-13 ENCOUNTER — PATIENT ROUNDING (BHMG ONLY) (OUTPATIENT)
Dept: NEUROSURGERY | Facility: CLINIC | Age: 66
End: 2021-08-13

## 2021-08-13 NOTE — PROGRESS NOTES
August 13, 2021    Hello, may I speak with Shelley Leach?    My name is RICCARDO KILPATRICK    I am  with [facility]  [address]  [place] [address] [phone].    Before we get started may I verify your date of birth? 1955    I am calling to officially welcome you to our practice and ask about your recent visit. Is this a good time to talk? NO - VOICEMAIL LEFT    Tell me about your visit with us. What things went well?         We're always looking for ways to make our patients' experiences even better. Do you have recommendations on ways we may improve?      Overall were you satisfied with your first visit to our practice?        I appreciate you taking the time to speak with me today. Is there anything else I can do for you?       Thank you, and have a great day.

## 2021-08-20 ENCOUNTER — APPOINTMENT (OUTPATIENT)
Dept: PREADMISSION TESTING | Facility: HOSPITAL | Age: 66
End: 2021-08-20

## 2021-08-30 RX ORDER — PEN NEEDLE, DIABETIC 32GX 5/32"
NEEDLE, DISPOSABLE MISCELLANEOUS
Qty: 100 EACH | Refills: 3 | Status: SHIPPED | OUTPATIENT
Start: 2021-08-30 | End: 2022-02-15

## 2021-10-18 ENCOUNTER — OFFICE VISIT (OUTPATIENT)
Dept: ENDOCRINOLOGY | Facility: CLINIC | Age: 66
End: 2021-10-18

## 2021-10-18 VITALS
BODY MASS INDEX: 27.48 KG/M2 | OXYGEN SATURATION: 95 % | DIASTOLIC BLOOD PRESSURE: 62 MMHG | WEIGHT: 140 LBS | SYSTOLIC BLOOD PRESSURE: 112 MMHG | HEIGHT: 60 IN | HEART RATE: 62 BPM

## 2021-10-18 DIAGNOSIS — E11.65 UNCONTROLLED TYPE 2 DIABETES MELLITUS WITH HYPERGLYCEMIA (HCC): Primary | ICD-10-CM

## 2021-10-18 LAB
EXPIRATION DATE: ABNORMAL
EXPIRATION DATE: NORMAL
GLUCOSE BLDC GLUCOMTR-MCNC: 172 MG/DL (ref 70–130)
HBA1C MFR BLD: 9.2 %
Lab: ABNORMAL
Lab: NORMAL

## 2021-10-18 PROCEDURE — 82947 ASSAY GLUCOSE BLOOD QUANT: CPT | Performed by: INTERNAL MEDICINE

## 2021-10-18 PROCEDURE — 83036 HEMOGLOBIN GLYCOSYLATED A1C: CPT | Performed by: INTERNAL MEDICINE

## 2021-10-18 PROCEDURE — 99214 OFFICE O/P EST MOD 30 MIN: CPT | Performed by: INTERNAL MEDICINE

## 2021-10-18 RX ORDER — INSULIN ASPART 100 [IU]/ML
INJECTION, SOLUTION INTRAVENOUS; SUBCUTANEOUS
Qty: 15 ML | Refills: 5 | Status: SHIPPED | OUTPATIENT
Start: 2021-10-18 | End: 2022-02-15

## 2021-10-18 RX ORDER — GABAPENTIN 100 MG/1
100 CAPSULE ORAL DAILY
COMMUNITY
Start: 2021-08-02 | End: 2022-05-27

## 2021-10-18 RX ORDER — CARVEDILOL 6.25 MG/1
6.25 TABLET ORAL EVERY 12 HOURS
COMMUNITY
Start: 2021-07-30 | End: 2022-04-14 | Stop reason: SDUPTHER

## 2021-10-18 NOTE — ASSESSMENT & PLAN NOTE
a1c marginally better. Fasting bg readings are good but a1c indicates poor control overall with high risk of complications  Will increase novolog  Will stop farxiga due to cost and ineffectiveness

## 2021-10-18 NOTE — PROGRESS NOTES
"     Office Note      Date: 10/18/2021  Patient Name: Shelley Leach  MRN: [id number]  : 1955    Chief Complaint   Patient presents with   • Diabetes       History of Present Illness:   Shelley Leach is a 66 y.o. female who presents for Diabetes - type 2.   On 2 insulin shots and takes daily farxiga  bg checks are done daily  Fasting bg are normal      Last A1c:  Hemoglobin A1C   Date Value Ref Range Status   10/18/2021 9.2 % Final       Changes in health since last visit: under a lot of stress . Last eye exam due .    Subjective     Review of Systems:   Review of Systems   Constitutional: Negative.    HENT: Negative.    Eyes: Negative.    Respiratory: Negative.        The following portions of the patient's history were reviewed and updated as appropriate: allergies, current medications, past family history, past medical history, past social history, past surgical history and problem list.    Objective     Visit Vitals  /62   Pulse 62   Ht 152.4 cm (60\")   Wt 63.5 kg (140 lb)   SpO2 95%   BMI 27.34 kg/m²       Labs:    CMP  Lab Results   Component Value Date    GLUCOSE 169 (H) 2020    BUN 19 2020    CREATININE 1.01 (H) 2020    EGFRIFNONA 55 (L) 2020    BCR 18.8 2020    K 4.8 2020    CO2 29.3 (H) 2020    CALCIUM 9.4 2020    AST 16 2020    ALT 13 2020        CBC w/DIFF  No results found for: WBC, RBC, HGB, HCT, MCV, MCH, MCHC, RDW, RDWSD, MPV, PLT, NEUTRORELPCT, LYMPHORELPCT, MONORELPCT, EOSRELPCT, BASORELPCT, AUTOIGPER, NEUTROABS, LYMPHSABS, MONOSABS, EOSABS, BASOSABS, AUTOIGNUM, NRBC    Physical Exam:  Physical Exam  Vitals reviewed.   Constitutional:       Appearance: Normal appearance.   Neurological:      Mental Status: She is alert.   Psychiatric:         Mood and Affect: Mood normal.         Thought Content: Thought content normal.         Judgment: Judgment normal.          Assessment / Plan      Assessment & Plan:  Problem List " Items Addressed This Visit        Other    Uncontrolled type 2 diabetes mellitus with hyperglycemia (HCC) - Primary    Current Assessment & Plan     a1c marginally better. Fasting bg readings are good but a1c indicates poor control overall with high risk of complications  Will increase novolog  Will stop farxiga due to cost and ineffectiveness          Relevant Medications    Insulin Glargine, 1 Unit Dial, (Toujeo SoloStar) 300 UNIT/ML solution pen-injector injection    insulin aspart (NovoLOG FlexPen) 100 UNIT/ML solution pen-injector sc pen    Other Relevant Orders    POC Glycosylated Hemoglobin (Hb A1C) (Completed)    POC Glucose, Blood (Completed)           Sonu Talbot MD   10/18/2021

## 2021-10-21 DIAGNOSIS — I65.21 CAROTID STENOSIS, ASYMPTOMATIC, RIGHT: Primary | ICD-10-CM

## 2021-11-09 ENCOUNTER — HOSPITAL ENCOUNTER (OUTPATIENT)
Facility: HOSPITAL | Age: 66
Setting detail: HOSPITAL OUTPATIENT SURGERY
Discharge: HOME | End: 2021-11-09
Attending: RADIOLOGY | Admitting: RADIOLOGY

## 2021-11-09 VITALS
RESPIRATION RATE: 16 BRPM | HEIGHT: 60 IN | TEMPERATURE: 97.6 F | HEART RATE: 59 BPM | DIASTOLIC BLOOD PRESSURE: 80 MMHG | OXYGEN SATURATION: 95 % | WEIGHT: 145 LBS | SYSTOLIC BLOOD PRESSURE: 171 MMHG | BODY MASS INDEX: 28.47 KG/M2

## 2021-11-09 DIAGNOSIS — I65.21 CAROTID STENOSIS, ASYMPTOMATIC, RIGHT: ICD-10-CM

## 2021-11-09 LAB
ANION GAP SERPL CALCULATED.3IONS-SCNC: 8 MMOL/L (ref 5–15)
BUN SERPL-MCNC: 15 MG/DL (ref 8–23)
BUN/CREAT SERPL: 19.5 (ref 7–25)
CALCIUM SPEC-SCNC: 8.7 MG/DL (ref 8.6–10.5)
CHLORIDE SERPL-SCNC: 103 MMOL/L (ref 98–107)
CO2 SERPL-SCNC: 28 MMOL/L (ref 22–29)
CREAT SERPL-MCNC: 0.77 MG/DL (ref 0.57–1)
DEPRECATED RDW RBC AUTO: 41.1 FL (ref 37–54)
ERYTHROCYTE [DISTWIDTH] IN BLOOD BY AUTOMATED COUNT: 11.9 % (ref 12.3–15.4)
GFR SERPL CREATININE-BSD FRML MDRD: 75 ML/MIN/1.73
GLUCOSE SERPL-MCNC: 149 MG/DL (ref 65–99)
HCT VFR BLD AUTO: 36.4 % (ref 34–46.6)
HGB BLD-MCNC: 12.4 G/DL (ref 12–15.9)
MCH RBC QN AUTO: 32 PG (ref 26.6–33)
MCHC RBC AUTO-ENTMCNC: 34.1 G/DL (ref 31.5–35.7)
MCV RBC AUTO: 94.1 FL (ref 79–97)
PLATELET # BLD AUTO: 216 10*3/MM3 (ref 140–450)
PMV BLD AUTO: 11.4 FL (ref 6–12)
POTASSIUM SERPL-SCNC: 4.3 MMOL/L (ref 3.5–5.2)
RBC # BLD AUTO: 3.87 10*6/MM3 (ref 3.77–5.28)
SODIUM SERPL-SCNC: 139 MMOL/L (ref 136–145)
WBC # BLD AUTO: 5.75 10*3/MM3 (ref 3.4–10.8)

## 2021-11-09 PROCEDURE — C1769 GUIDE WIRE: HCPCS | Performed by: RADIOLOGY

## 2021-11-09 PROCEDURE — S0260 H&P FOR SURGERY: HCPCS | Performed by: PHYSICIAN ASSISTANT

## 2021-11-09 PROCEDURE — 36226 PLACE CATH VERTEBRAL ART: CPT | Performed by: RADIOLOGY

## 2021-11-09 PROCEDURE — 80048 BASIC METABOLIC PNL TOTAL CA: CPT | Performed by: RADIOLOGY

## 2021-11-09 PROCEDURE — 25010000002 HEPARIN (PORCINE) PER 1000 UNITS: Performed by: RADIOLOGY

## 2021-11-09 PROCEDURE — 36223 PLACE CATH CAROTID/INOM ART: CPT | Performed by: RADIOLOGY

## 2021-11-09 PROCEDURE — 25010000002 FENTANYL CITRATE (PF) 50 MCG/ML SOLUTION: Performed by: RADIOLOGY

## 2021-11-09 PROCEDURE — 85027 COMPLETE CBC AUTOMATED: CPT | Performed by: RADIOLOGY

## 2021-11-09 PROCEDURE — 99214 OFFICE O/P EST MOD 30 MIN: CPT

## 2021-11-09 PROCEDURE — 99152 MOD SED SAME PHYS/QHP 5/>YRS: CPT | Performed by: RADIOLOGY

## 2021-11-09 PROCEDURE — 0 IODIXANOL PER 1 ML: Performed by: RADIOLOGY

## 2021-11-09 PROCEDURE — 25010000002 MIDAZOLAM PER 1 MG: Performed by: RADIOLOGY

## 2021-11-09 RX ORDER — SODIUM CHLORIDE 0.9 % (FLUSH) 0.9 %
10 SYRINGE (ML) INJECTION EVERY 12 HOURS SCHEDULED
Status: DISCONTINUED | OUTPATIENT
Start: 2021-11-09 | End: 2021-11-09 | Stop reason: HOSPADM

## 2021-11-09 RX ORDER — LIDOCAINE HYDROCHLORIDE 10 MG/ML
INJECTION, SOLUTION EPIDURAL; INFILTRATION; INTRACAUDAL; PERINEURAL AS NEEDED
Status: DISCONTINUED | OUTPATIENT
Start: 2021-11-09 | End: 2021-11-09 | Stop reason: HOSPADM

## 2021-11-09 RX ORDER — FENTANYL CITRATE 50 UG/ML
INJECTION, SOLUTION INTRAMUSCULAR; INTRAVENOUS AS NEEDED
Status: DISCONTINUED | OUTPATIENT
Start: 2021-11-09 | End: 2021-11-09 | Stop reason: HOSPADM

## 2021-11-09 RX ORDER — IODIXANOL 320 MG/ML
INJECTION, SOLUTION INTRAVASCULAR AS NEEDED
Status: DISCONTINUED | OUTPATIENT
Start: 2021-11-09 | End: 2021-11-09 | Stop reason: HOSPADM

## 2021-11-09 RX ORDER — ASPIRIN 81 MG/1
81 TABLET ORAL DAILY
COMMUNITY

## 2021-11-09 RX ORDER — BUTALBITAL, ACETAMINOPHEN AND CAFFEINE 50; 325; 40 MG/1; MG/1; MG/1
1 TABLET ORAL EVERY 4 HOURS PRN
Status: DISCONTINUED | OUTPATIENT
Start: 2021-11-09 | End: 2021-11-09 | Stop reason: HOSPADM

## 2021-11-09 RX ORDER — ACETAMINOPHEN 325 MG/1
650 TABLET ORAL ONCE
Status: COMPLETED | OUTPATIENT
Start: 2021-11-09 | End: 2021-11-09

## 2021-11-09 RX ORDER — SODIUM CHLORIDE 9 MG/ML
75 INJECTION, SOLUTION INTRAVENOUS CONTINUOUS
Status: ACTIVE | OUTPATIENT
Start: 2021-11-09 | End: 2021-11-09

## 2021-11-09 RX ORDER — SODIUM CHLORIDE 0.9 % (FLUSH) 0.9 %
10 SYRINGE (ML) INJECTION AS NEEDED
Status: DISCONTINUED | OUTPATIENT
Start: 2021-11-09 | End: 2021-11-09 | Stop reason: HOSPADM

## 2021-11-09 RX ORDER — SODIUM CHLORIDE 9 MG/ML
100 INJECTION, SOLUTION INTRAVENOUS CONTINUOUS
Status: DISCONTINUED | OUTPATIENT
Start: 2021-11-09 | End: 2021-11-09 | Stop reason: HOSPADM

## 2021-11-09 RX ORDER — SODIUM CHLORIDE 0.9 % (FLUSH) 0.9 %
1-10 SYRINGE (ML) INJECTION AS NEEDED
Status: DISCONTINUED | OUTPATIENT
Start: 2021-11-09 | End: 2021-11-09 | Stop reason: HOSPADM

## 2021-11-09 RX ORDER — MIDAZOLAM HYDROCHLORIDE 1 MG/ML
INJECTION INTRAMUSCULAR; INTRAVENOUS AS NEEDED
Status: DISCONTINUED | OUTPATIENT
Start: 2021-11-09 | End: 2021-11-09 | Stop reason: HOSPADM

## 2021-11-09 RX ORDER — SUCRALFATE 1 G/1
1 TABLET ORAL 4 TIMES DAILY
COMMUNITY

## 2021-11-09 RX ADMIN — SODIUM CHLORIDE 100 ML/HR: 9 INJECTION, SOLUTION INTRAVENOUS at 09:23

## 2021-11-09 RX ADMIN — ACETAMINOPHEN 650 MG: 325 TABLET, FILM COATED ORAL at 12:21

## 2021-11-09 RX ADMIN — BUTALBITAL, ACETAMINOPHEN, AND CAFFEINE 1 TABLET: 50; 325; 40 TABLET ORAL at 13:48

## 2021-11-09 NOTE — H&P
NAME: LAUREN LEACH   DOS: 2021  : 1955  PCP: Tom Salas MD    Chief Complaint:  No chief complaint on file.      History of Present Illness: Ms. Leach is a 66 y.o. female who was initially seen in the neuro interventional office due to episodes of dizziness and carotid duplex demonstrating incidental occlusion of the left ICA with indeterminate severity of the right carotid stenosis.  She denies any prior stroke or TIA-like symptoms.  Specifically, no unilateral weakness/numbness or or changes in vision or speech.  Patient has been remained on chronic aspirin and statin therapy, with last dose yesterday evening.  She is seen today for a diagnostic angiogram.      Past Medical History:   Diagnosis Date   • Dietary counseling     diet education-Abstracted from [facility]   • Dietary counseling and surveillance    • History of stroke    • History of thyroid disorder     Thyroid problem-Abstracted from [facility]   • Hypothyroidism    • Type 2 diabetes mellitus, uncontrolled (HCC)        Past Surgical History:   Procedure Laterality Date   • BLEPHAROPLASTY     • CARPAL TUNNEL RELEASE Bilateral    • CHOLECYSTECTOMY     • GASTRIC BYPASS     • HYSTERECTOMY     • OVARIAN CYST SURGERY      x 2   • ROTATOR CUFF REPAIR Left    • THYROIDECTOMY, PARTIAL     • TONSILLECTOMY               Review of Systems   Constitutional: Positive for fatigue.   HENT: Positive for mouth sores and postnasal drip.    Eyes: Positive for itching and visual disturbance.   Cardiovascular: Positive for palpitations.   Genitourinary: Positive for frequency and urgency.   Neurological: Positive for dizziness, syncope, weakness and light-headedness.   All other systems reviewed and are negative.           Medications:    Current Facility-Administered Medications:   •  sodium chloride 0.9 % flush 1-10 mL, 1-10 mL, Intravenous, PRN, GivenSukumar MD  •  sodium chloride 0.9 % flush 10 mL, 10 mL, Intravenous, Q12H, GivenSukumar MD  •   sodium chloride 0.9 % infusion, 100 mL/hr, Intravenous, Continuous, Given, Sukumar BIGGS MD, Last Rate: 100 mL/hr at 11/09/21 0923, 100 mL/hr at 11/09/21 0923    Allergies:  Allergies   Allergen Reactions   • Percocet [Oxycodone-Acetaminophen] Itching   • Morphine Other (See Comments)   • Sulfamethoxazole-Trimethoprim Swelling   • Penicillins Rash       Social History     Tobacco Use   • Smoking status: Never Smoker   • Smokeless tobacco: Never Used   Vaping Use   • Vaping Use: Never used   Substance Use Topics   • Alcohol use: Never   • Drug use: Never       Family History   Problem Relation Age of Onset   • Diabetes Mother    • Diabetes Father    • Hypertension Father    • Thyroid disease Father    • Kidney disease Father        Review of Imaging:  No new imaging to review    Vitals:    11/09/21 0901   BP: 180/69   Pulse: 61   Temp:    SpO2: 98%     Body mass index is 28.32 kg/m².    Physical Exam  Constitutional:       Appearance: Normal appearance.   HENT:      Head: Normocephalic and atraumatic.   Eyes:      Extraocular Movements: Extraocular movements intact.      Conjunctiva/sclera: Conjunctivae normal.   Pulmonary:      Effort: Pulmonary effort is normal. No respiratory distress.   Neurological:      Mental Status: She is alert and oriented to person, place, and time. Mental status is at baseline.      Deep Tendon Reflexes: Strength normal.   Psychiatric:         Speech: Speech normal.       Neurologic Exam     Mental Status   Oriented to person, place, and time.   Speech: speech is normal     Cranial Nerves   Cranial nerves II through XII intact.     Motor Exam     Strength   Strength 5/5 throughout.       Diagnoses/Plan:    Ms. Leach is a 66 y.o. female who was seen in clinic due to incidental left carotid occlusion and indeterminate severity of right carotid stenosis found on carotid duplex.  Patient denied any stroke or TIA-like symptoms.  However, patient had been seen by CT surgery, Dr. Everett Morris, who  "recommended a CT angiogram for further interrogation of her right carotid stenosis and to confirm the left carotid occlusion.  However, her renal function is \"borderline\" and a CT angiogram is not feasible.  Therefore, patient presents today to undergo a diagnostic catheter angiogram via right radial access to definitively evaluate her carotid disease with limited contrast usage.  It was noted that this is extremely low for risk of worsening her renal function.  Risk and benefits of the procedure were discussed.  Treatment recommendations pending the results of the study.  Patient notes understanding and willing to proceed.        Isis Blakely PA-C                "

## 2021-11-09 NOTE — CONSULTS
[facility] Cardiothoracic Surgery In-Patient Consult    Name:  Shelley Leach  MRN Number:  [id number]  Date of Admission:  11/9/2021  Date of Consultation: 11/9/2021    Consulting Provider:    PCP: Tom Salas MD  IP Care Team:  Patient Care Team:  Tom Salas MD as PCP - General  Tom Salas MD as PCP - Family Medicine  Javed Morris MD as Referring Physician (Cardiothoracic Surgery)  Sonu Talbot MD as Consulting Physician (Endocrinology)    Reason for Consultation: Right carotid stenosis    History of Present Illness:    Shelley Leach is a 66 y.o. female with a past medical history of T2DM, HLD, and hypothyroidism. She presented to [facility] on 11/9/2021 for a scheduled diagnostic angiogram with Dr. Martinez results are pending. She has been having episodes of dizziness for the past 3-4 months. She denies any numbness, weakness, problems with speech, or changes in vision. She was last seen in our office on 7/12/2021 with Dr. Morris.    Review of Systems:  Review of Systems   HENT: Negative.    Eyes: Negative.    Respiratory: Negative.    Cardiovascular: Negative.    Gastrointestinal: Negative.    Endocrine: Negative.    Genitourinary: Negative.    Musculoskeletal: Negative.    Skin: Negative.    Allergic/Immunologic: Negative.    Neurological: Positive for dizziness.   Hematological: Negative.    Psychiatric/Behavioral: Negative.        Past Medical History:    Past Medical History:   Diagnosis Date    Dietary counseling     diet education-Abstracted from [facility]    Dietary counseling and surveillance     History of stroke     History of thyroid disorder     Thyroid problem-Abstracted from [facility]    Hypothyroidism     Type 2 diabetes mellitus, uncontrolled (HCC)        Past Surgical History:    Past Surgical History:   Procedure Laterality Date    BLEPHAROPLASTY      CARPAL TUNNEL RELEASE Bilateral     CHOLECYSTECTOMY      GASTRIC BYPASS       HYSTERECTOMY      OVARIAN CYST SURGERY      x 2    ROTATOR CUFF REPAIR Left     THYROIDECTOMY, PARTIAL      TONSILLECTOMY         Family History:    Family History   Problem Relation Age of Onset    Diabetes Mother     Diabetes Father     Hypertension Father     Thyroid disease Father     Kidney disease Father        Social History:    Social History     Socioeconomic History    Marital status:    Tobacco Use    Smoking status: Never Smoker    Smokeless tobacco: Never Used   Vaping Use    Vaping Use: Never used   Substance and Sexual Activity    Alcohol use: Never    Drug use: Never    Sexual activity: Defer       Allergies:  Allergies   Allergen Reactions    Percocet [Oxycodone-Acetaminophen] Itching    Morphine Other (See Comments)    Sulfamethoxazole-Trimethoprim Swelling    Penicillins Rash         Physical Exam:  Vital Signs:    Vitals:    11/09/21 1147 11/09/21 1200 11/09/21 1215 11/09/21 1230   BP: 147/74 177/71 128/70 141/76   BP Location:       Pulse: 59 70 64 67   Resp:       Temp:       TempSrc:       SpO2: 95% 95% 97% 96%   Weight:       Height:         Body mass index is 28.32 kg/m².     Physical Exam  Constitutional:       Appearance: Normal appearance.   HENT:      Head: Normocephalic.      Mouth/Throat:      Pharynx: Oropharynx is clear.   Eyes:      Pupils: Pupils are equal, round, and reactive to light.   Cardiovascular:      Rate and Rhythm: Normal rate and regular rhythm.      Heart sounds: Normal heart sounds.   Pulmonary:      Breath sounds: Normal breath sounds.   Abdominal:      General: Bowel sounds are normal.   Musculoskeletal:         General: Normal range of motion.   Skin:     General: Skin is warm and dry.   Neurological:      Mental Status: She is alert and oriented to person, place, and time.   Psychiatric:         Mood and Affect: Mood normal.         Behavior: Behavior normal.         Labs/Imaging/Procedures:   Results from last 7 days   Lab Units 11/09/21  0853   SODIUM  mmol/L 139   POTASSIUM mmol/L 4.3   CHLORIDE mmol/L 103   CO2 mmol/L 28.0   BUN mg/dL 15   CREATININE mg/dL 0.77   CALCIUM mg/dL 8.7   GLUCOSE mg/dL 149*         Results from last 7 days   Lab Units 11/09/21  0853   WBC 10*3/mm3 5.75   HEMOGLOBIN g/dL 12.4   HEMATOCRIT % 36.4   PLATELETS 10*3/mm3 216                      Assessment:    Bilateral carotid artery stenosis      Plan:  Surgery for right CEA with Dr. Arturo MCCALL       Risks of surgery were discussed with the patient including: bleeding, infection, blood clots, kidney damage, CVA, MI, or death.  Patient understands risks and agrees to proceed.        YUAN Combs  [facility] Cardiothoracic Surgery  12:02 EST  11/09/21     Thank you for allowing us to participate in the care of your patient. Please do not hesitate to contact us with additional questions or concerns.

## 2021-11-11 ENCOUNTER — PREP FOR SURGERY (OUTPATIENT)
Dept: OTHER | Facility: HOSPITAL | Age: 66
End: 2021-11-11

## 2021-11-11 DIAGNOSIS — I65.23 BILATERAL CAROTID ARTERY STENOSIS: Primary | ICD-10-CM

## 2021-11-11 RX ORDER — VANCOMYCIN HYDROCHLORIDE 1 G/200ML
15 INJECTION, SOLUTION INTRAVENOUS ONCE
Status: CANCELLED | OUTPATIENT
Start: 2021-11-17 | End: 2021-11-17

## 2021-11-11 RX ORDER — CHLORHEXIDINE GLUCONATE 500 MG/1
1 CLOTH TOPICAL EVERY 12 HOURS PRN
Status: CANCELLED | OUTPATIENT
Start: 2021-11-16

## 2021-11-16 ENCOUNTER — APPOINTMENT (OUTPATIENT)
Dept: PREADMISSION TESTING | Facility: HOSPITAL | Age: 66
End: 2021-11-16

## 2021-12-17 ENCOUNTER — PREP FOR SURGERY (OUTPATIENT)
Dept: OTHER | Facility: HOSPITAL | Age: 66
End: 2021-12-17

## 2021-12-17 DIAGNOSIS — I65.21 CAROTID STENOSIS, ASYMPTOMATIC, RIGHT: Primary | ICD-10-CM

## 2021-12-17 RX ORDER — VANCOMYCIN HYDROCHLORIDE 1 G/200ML
15 INJECTION, SOLUTION INTRAVENOUS ONCE
Status: CANCELLED | OUTPATIENT
Start: 2021-12-22 | End: 2021-12-22

## 2021-12-17 RX ORDER — CHLORHEXIDINE GLUCONATE 500 MG/1
1 CLOTH TOPICAL EVERY 12 HOURS PRN
Status: CANCELLED | OUTPATIENT
Start: 2021-12-21

## 2021-12-21 ENCOUNTER — ANESTHESIA EVENT (OUTPATIENT)
Dept: PERIOP | Facility: HOSPITAL | Age: 66
End: 2021-12-21

## 2021-12-21 ENCOUNTER — PRE-ADMISSION TESTING (OUTPATIENT)
Dept: PREADMISSION TESTING | Facility: HOSPITAL | Age: 66
End: 2021-12-21

## 2021-12-21 ENCOUNTER — HOSPITAL ENCOUNTER (OUTPATIENT)
Dept: GENERAL RADIOLOGY | Facility: HOSPITAL | Age: 66
Discharge: HOME | End: 2021-12-21

## 2021-12-21 VITALS — WEIGHT: 144.18 LBS | BODY MASS INDEX: 27.22 KG/M2 | HEIGHT: 61 IN

## 2021-12-21 DIAGNOSIS — I65.21 CAROTID STENOSIS, ASYMPTOMATIC, RIGHT: ICD-10-CM

## 2021-12-21 DIAGNOSIS — I65.23 BILATERAL CAROTID ARTERY STENOSIS: ICD-10-CM

## 2021-12-21 LAB
ABO GROUP BLD: NORMAL
ANION GAP SERPL CALCULATED.3IONS-SCNC: 7 MMOL/L (ref 5–15)
BLD GP AB SCN SERPL QL: NEGATIVE
BUN SERPL-MCNC: 12 MG/DL (ref 8–23)
BUN/CREAT SERPL: 15.4 (ref 7–25)
CALCIUM SPEC-SCNC: 9.4 MG/DL (ref 8.6–10.5)
CHLORIDE SERPL-SCNC: 100 MMOL/L (ref 98–107)
CO2 SERPL-SCNC: 31 MMOL/L (ref 22–29)
CREAT SERPL-MCNC: 0.78 MG/DL (ref 0.57–1)
DEPRECATED RDW RBC AUTO: 39.9 FL (ref 37–54)
ERYTHROCYTE [DISTWIDTH] IN BLOOD BY AUTOMATED COUNT: 11.6 % (ref 12.3–15.4)
GFR SERPL CREATININE-BSD FRML MDRD: 74 ML/MIN/1.73
GLUCOSE SERPL-MCNC: 179 MG/DL (ref 65–99)
HBA1C MFR BLD: 10.4 % (ref 4.8–5.6)
HCT VFR BLD AUTO: 40.6 % (ref 34–46.6)
HGB BLD-MCNC: 13.8 G/DL (ref 12–15.9)
INR PPP: 0.98 (ref 0.85–1.16)
MCH RBC QN AUTO: 31.4 PG (ref 26.6–33)
MCHC RBC AUTO-ENTMCNC: 34 G/DL (ref 31.5–35.7)
MCV RBC AUTO: 92.5 FL (ref 79–97)
PLATELET # BLD AUTO: 271 10*3/MM3 (ref 140–450)
PMV BLD AUTO: 10.9 FL (ref 6–12)
POTASSIUM SERPL-SCNC: 4.9 MMOL/L (ref 3.5–5.2)
PROTHROMBIN TIME: 12.7 SECONDS (ref 11.4–14.4)
RBC # BLD AUTO: 4.39 10*6/MM3 (ref 3.77–5.28)
RH BLD: NEGATIVE
SARS-COV-2 RNA PNL SPEC NAA+PROBE: NOT DETECTED
SODIUM SERPL-SCNC: 138 MMOL/L (ref 136–145)
T&S EXPIRATION DATE: NORMAL
WBC NRBC COR # BLD: 6.55 10*3/MM3 (ref 3.4–10.8)

## 2021-12-21 PROCEDURE — 80048 BASIC METABOLIC PNL TOTAL CA: CPT

## 2021-12-21 PROCEDURE — C9803 HOPD COVID-19 SPEC COLLECT: HCPCS

## 2021-12-21 PROCEDURE — 85027 COMPLETE CBC AUTOMATED: CPT

## 2021-12-21 PROCEDURE — 93005 ELECTROCARDIOGRAM TRACING: CPT

## 2021-12-21 PROCEDURE — 83036 HEMOGLOBIN GLYCOSYLATED A1C: CPT

## 2021-12-21 PROCEDURE — 85610 PROTHROMBIN TIME: CPT

## 2021-12-21 PROCEDURE — 86900 BLOOD TYPING SEROLOGIC ABO: CPT

## 2021-12-21 PROCEDURE — 93010 ELECTROCARDIOGRAM REPORT: CPT | Performed by: INTERNAL MEDICINE

## 2021-12-21 PROCEDURE — 86901 BLOOD TYPING SEROLOGIC RH(D): CPT

## 2021-12-21 PROCEDURE — 71046 X-RAY EXAM CHEST 2 VIEWS: CPT

## 2021-12-21 PROCEDURE — U0004 COV-19 TEST NON-CDC HGH THRU: HCPCS

## 2021-12-21 PROCEDURE — 36415 COLL VENOUS BLD VENIPUNCTURE: CPT

## 2021-12-21 PROCEDURE — 86850 RBC ANTIBODY SCREEN: CPT

## 2021-12-21 RX ORDER — CHLORHEXIDINE GLUCONATE 500 MG/1
1 CLOTH TOPICAL EVERY 12 HOURS PRN
Status: ACTIVE | OUTPATIENT
Start: 2021-12-21

## 2021-12-21 RX ORDER — FAMOTIDINE 10 MG/ML
20 INJECTION, SOLUTION INTRAVENOUS ONCE
Status: CANCELLED | OUTPATIENT
Start: 2021-12-21 | End: 2021-12-21

## 2021-12-21 RX ORDER — GABAPENTIN 300 MG/1
300 CAPSULE ORAL NIGHTLY
COMMUNITY
End: 2022-05-27 | Stop reason: SDUPTHER

## 2021-12-21 RX ORDER — LOSARTAN POTASSIUM 50 MG/1
50 TABLET ORAL DAILY
COMMUNITY
End: 2022-04-14 | Stop reason: SDUPTHER

## 2021-12-22 ENCOUNTER — APPOINTMENT (OUTPATIENT)
Dept: NEUROLOGY | Facility: HOSPITAL | Age: 66
End: 2021-12-22

## 2021-12-22 ENCOUNTER — HOSPITAL ENCOUNTER (INPATIENT)
Facility: HOSPITAL | Age: 66
LOS: 1 days | Discharge: HOME | End: 2021-12-23
Attending: THORACIC SURGERY (CARDIOTHORACIC VASCULAR SURGERY) | Admitting: THORACIC SURGERY (CARDIOTHORACIC VASCULAR SURGERY)

## 2021-12-22 ENCOUNTER — ANESTHESIA (OUTPATIENT)
Dept: PERIOP | Facility: HOSPITAL | Age: 66
End: 2021-12-22

## 2021-12-22 ENCOUNTER — ANESTHESIA EVENT CONVERTED (OUTPATIENT)
Dept: ANESTHESIOLOGY | Facility: HOSPITAL | Age: 66
End: 2021-12-22

## 2021-12-22 DIAGNOSIS — I65.23 BILATERAL CAROTID ARTERY STENOSIS: ICD-10-CM

## 2021-12-22 DIAGNOSIS — I65.21 CAROTID STENOSIS, ASYMPTOMATIC, RIGHT: ICD-10-CM

## 2021-12-22 LAB
ABO GROUP BLD: NORMAL
GLUCOSE BLDC GLUCOMTR-MCNC: 133 MG/DL (ref 70–130)
GLUCOSE BLDC GLUCOMTR-MCNC: 192 MG/DL (ref 70–130)
GLUCOSE BLDC GLUCOMTR-MCNC: 245 MG/DL (ref 70–130)
GLUCOSE BLDC GLUCOMTR-MCNC: 379 MG/DL (ref 70–130)
GLUCOSE BLDC GLUCOMTR-MCNC: 384 MG/DL (ref 70–130)
QT INTERVAL: 446 MS
QTC INTERVAL: 430 MS
RH BLD: NEGATIVE

## 2021-12-22 PROCEDURE — 25010000002 VANCOMYCIN PER 500 MG: Performed by: PHYSICIAN ASSISTANT

## 2021-12-22 PROCEDURE — 25010000002 GENTAMICIN PER 80 MG: Performed by: THORACIC SURGERY (CARDIOTHORACIC VASCULAR SURGERY)

## 2021-12-22 PROCEDURE — C1768 GRAFT, VASCULAR: HCPCS | Performed by: THORACIC SURGERY (CARDIOTHORACIC VASCULAR SURGERY)

## 2021-12-22 PROCEDURE — 35301 RECHANNELING OF ARTERY: CPT | Performed by: THORACIC SURGERY (CARDIOTHORACIC VASCULAR SURGERY)

## 2021-12-22 PROCEDURE — 82962 GLUCOSE BLOOD TEST: CPT | Performed by: FAMILY MEDICINE

## 2021-12-22 PROCEDURE — 25010000002 FENTANYL CITRATE (PF) 50 MCG/ML SOLUTION: Performed by: NURSE ANESTHETIST, CERTIFIED REGISTERED

## 2021-12-22 PROCEDURE — 25010000002 HEPARIN (PORCINE) PER 1000 UNITS: Performed by: THORACIC SURGERY (CARDIOTHORACIC VASCULAR SURGERY)

## 2021-12-22 PROCEDURE — 88304 TISSUE EXAM BY PATHOLOGIST: CPT | Performed by: THORACIC SURGERY (CARDIOTHORACIC VASCULAR SURGERY)

## 2021-12-22 PROCEDURE — 95816 EEG AWAKE AND DROWSY: CPT

## 2021-12-22 PROCEDURE — 82962 GLUCOSE BLOOD TEST: CPT

## 2021-12-22 PROCEDURE — 25010000002 HEPARIN (PORCINE) PER 1000 UNITS: Performed by: NURSE ANESTHETIST, CERTIFIED REGISTERED

## 2021-12-22 PROCEDURE — 0 LIDOCAINE 1 % SOLUTION: Performed by: THORACIC SURGERY (CARDIOTHORACIC VASCULAR SURGERY)

## 2021-12-22 PROCEDURE — 94640 AIRWAY INHALATION TREATMENT: CPT

## 2021-12-22 PROCEDURE — 25010000002 PROPOFOL 10 MG/ML EMULSION: Performed by: NURSE ANESTHETIST, CERTIFIED REGISTERED

## 2021-12-22 PROCEDURE — 88311 DECALCIFY TISSUE: CPT | Performed by: THORACIC SURGERY (CARDIOTHORACIC VASCULAR SURGERY)

## 2021-12-22 PROCEDURE — 63710000001 INSULIN LISPRO (HUMAN) PER 5 UNITS: Performed by: STUDENT IN AN ORGANIZED HEALTH CARE EDUCATION/TRAINING PROGRAM

## 2021-12-22 PROCEDURE — 95955 EEG DURING SURGERY: CPT

## 2021-12-22 PROCEDURE — 86900 BLOOD TYPING SEROLOGIC ABO: CPT

## 2021-12-22 PROCEDURE — 25010000002 VANCOMYCIN PER 500 MG: Performed by: STUDENT IN AN ORGANIZED HEALTH CARE EDUCATION/TRAINING PROGRAM

## 2021-12-22 PROCEDURE — 86901 BLOOD TYPING SEROLOGIC RH(D): CPT

## 2021-12-22 PROCEDURE — 25010000002 PROTAMINE SULFATE PER 10 MG: Performed by: NURSE ANESTHETIST, CERTIFIED REGISTERED

## 2021-12-22 PROCEDURE — 25010000002 PHENYLEPHRINE 10 MG/ML SOLUTION 1 ML VIAL: Performed by: NURSE ANESTHETIST, CERTIFIED REGISTERED

## 2021-12-22 PROCEDURE — 25010000002 FENTANYL CITRATE (PF) 50 MCG/ML SOLUTION

## 2021-12-22 PROCEDURE — 03CK0ZZ EXTIRPATION OF MATTER FROM RIGHT INTERNAL CAROTID ARTERY, OPEN APPROACH: ICD-10-PCS | Performed by: THORACIC SURGERY (CARDIOTHORACIC VASCULAR SURGERY)

## 2021-12-22 PROCEDURE — 03UK0KZ SUPPLEMENT RIGHT INTERNAL CAROTID ARTERY WITH NONAUTOLOGOUS TISSUE SUBSTITUTE, OPEN APPROACH: ICD-10-PCS | Performed by: THORACIC SURGERY (CARDIOTHORACIC VASCULAR SURGERY)

## 2021-12-22 PROCEDURE — 0 CEFAZOLIN PER 500 MG: Performed by: THORACIC SURGERY (CARDIOTHORACIC VASCULAR SURGERY)

## 2021-12-22 PROCEDURE — 4A10X4Z MONITORING OF CENTRAL NERVOUS ELECTRICAL ACTIVITY, EXTERNAL APPROACH: ICD-10-PCS | Performed by: THORACIC SURGERY (CARDIOTHORACIC VASCULAR SURGERY)

## 2021-12-22 PROCEDURE — S0260 H&P FOR SURGERY: HCPCS | Performed by: THORACIC SURGERY (CARDIOTHORACIC VASCULAR SURGERY)

## 2021-12-22 PROCEDURE — 99222 1ST HOSP IP/OBS MODERATE 55: CPT | Performed by: INTERNAL MEDICINE

## 2021-12-22 PROCEDURE — 25010000002 DEXAMETHASONE PER 1 MG: Performed by: NURSE ANESTHETIST, CERTIFIED REGISTERED

## 2021-12-22 PROCEDURE — 63710000001 INSULIN DETEMIR PER 5 UNITS: Performed by: INTERNAL MEDICINE

## 2021-12-22 PROCEDURE — 25010000002 ONDANSETRON PER 1 MG: Performed by: NURSE ANESTHETIST, CERTIFIED REGISTERED

## 2021-12-22 DEVICE — VASCU-GUARD PERIPHERAL VASCULAR PATCH IS PREPARED FROM BOVINE PERICARDIUM WHICH IS CROSS-LINKED WITH GLUTARALDEHYDE. THE PERICARDIUM IS PROCURED FROM CATTLE ORIGINATING IN THE UNITED STATES. VASCU-GUARD PERIPHERAL VASCULAR PATCH IS CHEMICALLY STERILIZED USING ETHANOL AND PROPYLENE OXIDE. VASCU-GUARD PERIPHERAL VASCULAR PATCH HAS BEEN TREATED WITH 1 MOLAR SODIUM HYDROXIDE FOR A MINIMUM OF 60 MINUTES AT 20 - 25 C.  VASCU-GUARD PERIPHERAL VASCULAR PATCH IS PACKAGED IN A CONTAINER FILLED WITH STERILE, NON-PYROGENIC WATER CONTAINING PROPYLENE OXIDE. THE CONTENTS OF THE UNOPENED, UNDAMAGED CONTAINER ARE STERILE.
Type: IMPLANTABLE DEVICE | Site: CAROTID | Status: FUNCTIONAL
Brand: VASCU-GUARD PERIPHERAL VASCULAR PATCH

## 2021-12-22 RX ORDER — LIDOCAINE HYDROCHLORIDE 10 MG/ML
0.5 INJECTION, SOLUTION EPIDURAL; INFILTRATION; INTRACAUDAL; PERINEURAL ONCE AS NEEDED
Status: COMPLETED | OUTPATIENT
Start: 2021-12-22 | End: 2021-12-22

## 2021-12-22 RX ORDER — SODIUM CHLORIDE 0.9 % (FLUSH) 0.9 %
3-10 SYRINGE (ML) INJECTION AS NEEDED
Status: DISCONTINUED | OUTPATIENT
Start: 2021-12-22 | End: 2021-12-22 | Stop reason: HOSPADM

## 2021-12-22 RX ORDER — IPRATROPIUM BROMIDE AND ALBUTEROL SULFATE 2.5; .5 MG/3ML; MG/3ML
SOLUTION RESPIRATORY (INHALATION)
Status: COMPLETED
Start: 2021-12-22 | End: 2021-12-22

## 2021-12-22 RX ORDER — PROTAMINE SULFATE 10 MG/ML
INJECTION, SOLUTION INTRAVENOUS AS NEEDED
Status: DISCONTINUED | OUTPATIENT
Start: 2021-12-22 | End: 2021-12-22 | Stop reason: SURG

## 2021-12-22 RX ORDER — PROMETHAZINE HYDROCHLORIDE 25 MG/1
25 TABLET ORAL ONCE AS NEEDED
Status: DISCONTINUED | OUTPATIENT
Start: 2021-12-22 | End: 2021-12-22 | Stop reason: HOSPADM

## 2021-12-22 RX ORDER — VANCOMYCIN HYDROCHLORIDE 1 G/200ML
15 INJECTION, SOLUTION INTRAVENOUS ONCE
Status: COMPLETED | OUTPATIENT
Start: 2021-12-22 | End: 2021-12-22

## 2021-12-22 RX ORDER — SODIUM CHLORIDE 450 MG/100ML
30 INJECTION, SOLUTION INTRAVENOUS CONTINUOUS
Status: DISCONTINUED | OUTPATIENT
Start: 2021-12-22 | End: 2021-12-23 | Stop reason: HOSPADM

## 2021-12-22 RX ORDER — SODIUM CHLORIDE 0.9 % (FLUSH) 0.9 %
10 SYRINGE (ML) INJECTION EVERY 12 HOURS SCHEDULED
Status: DISCONTINUED | OUTPATIENT
Start: 2021-12-22 | End: 2021-12-22 | Stop reason: HOSPADM

## 2021-12-22 RX ORDER — PROMETHAZINE HYDROCHLORIDE 25 MG/1
25 SUPPOSITORY RECTAL ONCE AS NEEDED
Status: DISCONTINUED | OUTPATIENT
Start: 2021-12-22 | End: 2021-12-22 | Stop reason: HOSPADM

## 2021-12-22 RX ORDER — EZETIMIBE 10 MG/1
10 TABLET ORAL DAILY
Status: DISCONTINUED | OUTPATIENT
Start: 2021-12-22 | End: 2021-12-23 | Stop reason: HOSPADM

## 2021-12-22 RX ORDER — LEVOTHYROXINE SODIUM 0.1 MG/1
100 TABLET ORAL DAILY
Status: DISCONTINUED | OUTPATIENT
Start: 2021-12-22 | End: 2021-12-23 | Stop reason: HOSPADM

## 2021-12-22 RX ORDER — HEPARIN SODIUM 1000 [USP'U]/ML
INJECTION, SOLUTION INTRAVENOUS; SUBCUTANEOUS AS NEEDED
Status: DISCONTINUED | OUTPATIENT
Start: 2021-12-22 | End: 2021-12-22 | Stop reason: SURG

## 2021-12-22 RX ORDER — SODIUM CHLORIDE 0.9 % (FLUSH) 0.9 %
10 SYRINGE (ML) INJECTION AS NEEDED
Status: DISCONTINUED | OUTPATIENT
Start: 2021-12-22 | End: 2021-12-22 | Stop reason: HOSPADM

## 2021-12-22 RX ORDER — ONDANSETRON 2 MG/ML
4 INJECTION INTRAMUSCULAR; INTRAVENOUS EVERY 6 HOURS PRN
Status: DISCONTINUED | OUTPATIENT
Start: 2021-12-22 | End: 2021-12-23 | Stop reason: HOSPADM

## 2021-12-22 RX ORDER — LABETALOL HYDROCHLORIDE 5 MG/ML
5 INJECTION, SOLUTION INTRAVENOUS
Status: DISCONTINUED | OUTPATIENT
Start: 2021-12-22 | End: 2021-12-22 | Stop reason: HOSPADM

## 2021-12-22 RX ORDER — NALOXONE HCL 0.4 MG/ML
0.4 VIAL (ML) INJECTION AS NEEDED
Status: DISCONTINUED | OUTPATIENT
Start: 2021-12-22 | End: 2021-12-22 | Stop reason: HOSPADM

## 2021-12-22 RX ORDER — LIDOCAINE HYDROCHLORIDE 10 MG/ML
INJECTION, SOLUTION INFILTRATION; PERINEURAL AS NEEDED
Status: DISCONTINUED | OUTPATIENT
Start: 2021-12-22 | End: 2021-12-22 | Stop reason: HOSPADM

## 2021-12-22 RX ORDER — VENLAFAXINE 37.5 MG/1
37.5 TABLET ORAL 2 TIMES DAILY WITH MEALS
Status: DISCONTINUED | OUTPATIENT
Start: 2021-12-22 | End: 2021-12-23 | Stop reason: HOSPADM

## 2021-12-22 RX ORDER — CARVEDILOL 6.25 MG/1
6.25 TABLET ORAL EVERY 12 HOURS
Status: DISCONTINUED | OUTPATIENT
Start: 2021-12-23 | End: 2021-12-22

## 2021-12-22 RX ORDER — HYDRALAZINE HYDROCHLORIDE 20 MG/ML
5 INJECTION INTRAMUSCULAR; INTRAVENOUS
Status: DISCONTINUED | OUTPATIENT
Start: 2021-12-22 | End: 2021-12-22 | Stop reason: HOSPADM

## 2021-12-22 RX ORDER — FENTANYL CITRATE 50 UG/ML
50 INJECTION, SOLUTION INTRAMUSCULAR; INTRAVENOUS
Status: DISCONTINUED | OUTPATIENT
Start: 2021-12-22 | End: 2021-12-22 | Stop reason: HOSPADM

## 2021-12-22 RX ORDER — SODIUM CHLORIDE 0.9 % (FLUSH) 0.9 %
3 SYRINGE (ML) INJECTION EVERY 12 HOURS SCHEDULED
Status: DISCONTINUED | OUTPATIENT
Start: 2021-12-22 | End: 2021-12-22 | Stop reason: HOSPADM

## 2021-12-22 RX ORDER — PANTOPRAZOLE SODIUM 40 MG/1
40 TABLET, DELAYED RELEASE ORAL DAILY
Status: DISCONTINUED | OUTPATIENT
Start: 2021-12-22 | End: 2021-12-23 | Stop reason: HOSPADM

## 2021-12-22 RX ORDER — SODIUM CHLORIDE, SODIUM LACTATE, POTASSIUM CHLORIDE, CALCIUM CHLORIDE 600; 310; 30; 20 MG/100ML; MG/100ML; MG/100ML; MG/100ML
INJECTION, SOLUTION INTRAVENOUS CONTINUOUS PRN
Status: DISCONTINUED | OUTPATIENT
Start: 2021-12-22 | End: 2021-12-22 | Stop reason: SURG

## 2021-12-22 RX ORDER — LOSARTAN POTASSIUM 50 MG/1
50 TABLET ORAL DAILY
Status: DISCONTINUED | OUTPATIENT
Start: 2021-12-22 | End: 2021-12-23 | Stop reason: HOSPADM

## 2021-12-22 RX ORDER — ONDANSETRON 2 MG/ML
4 INJECTION INTRAMUSCULAR; INTRAVENOUS ONCE AS NEEDED
Status: DISCONTINUED | OUTPATIENT
Start: 2021-12-22 | End: 2021-12-22 | Stop reason: HOSPADM

## 2021-12-22 RX ORDER — PROPOFOL 10 MG/ML
VIAL (ML) INTRAVENOUS CONTINUOUS PRN
Status: DISCONTINUED | OUTPATIENT
Start: 2021-12-22 | End: 2021-12-22 | Stop reason: SURG

## 2021-12-22 RX ORDER — ATORVASTATIN CALCIUM 10 MG/1
10 TABLET, FILM COATED ORAL NIGHTLY
Status: DISCONTINUED | OUTPATIENT
Start: 2021-12-22 | End: 2021-12-23 | Stop reason: HOSPADM

## 2021-12-22 RX ORDER — HYDROCODONE BITARTRATE AND ACETAMINOPHEN 7.5; 325 MG/1; MG/1
1 TABLET ORAL EVERY 6 HOURS PRN
Status: DISCONTINUED | OUTPATIENT
Start: 2021-12-22 | End: 2021-12-23 | Stop reason: HOSPADM

## 2021-12-22 RX ORDER — FENTANYL CITRATE 50 UG/ML
INJECTION, SOLUTION INTRAMUSCULAR; INTRAVENOUS
Status: COMPLETED
Start: 2021-12-22 | End: 2021-12-22

## 2021-12-22 RX ORDER — EPHEDRINE SULFATE 50 MG/ML
INJECTION, SOLUTION INTRAVENOUS AS NEEDED
Status: DISCONTINUED | OUTPATIENT
Start: 2021-12-22 | End: 2021-12-22 | Stop reason: SURG

## 2021-12-22 RX ORDER — DEXAMETHASONE SODIUM PHOSPHATE 4 MG/ML
INJECTION, SOLUTION INTRA-ARTICULAR; INTRALESIONAL; INTRAMUSCULAR; INTRAVENOUS; SOFT TISSUE AS NEEDED
Status: DISCONTINUED | OUTPATIENT
Start: 2021-12-22 | End: 2021-12-22 | Stop reason: SURG

## 2021-12-22 RX ORDER — SODIUM CHLORIDE 0.9 % (FLUSH) 0.9 %
3 SYRINGE (ML) INJECTION EVERY 12 HOURS SCHEDULED
Status: DISCONTINUED | OUTPATIENT
Start: 2021-12-22 | End: 2021-12-23 | Stop reason: HOSPADM

## 2021-12-22 RX ORDER — DEXTROSE MONOHYDRATE 25 G/50ML
25 INJECTION, SOLUTION INTRAVENOUS
Status: DISCONTINUED | OUTPATIENT
Start: 2021-12-22 | End: 2021-12-23 | Stop reason: HOSPADM

## 2021-12-22 RX ORDER — NICARDIPINE HYDROCHLORIDE 2.5 MG/ML
INJECTION INTRAVENOUS
Status: DISPENSED
Start: 2021-12-22 | End: 2021-12-22

## 2021-12-22 RX ORDER — LIDOCAINE HYDROCHLORIDE 10 MG/ML
INJECTION, SOLUTION EPIDURAL; INFILTRATION; INTRACAUDAL; PERINEURAL AS NEEDED
Status: DISCONTINUED | OUTPATIENT
Start: 2021-12-22 | End: 2021-12-22 | Stop reason: SURG

## 2021-12-22 RX ORDER — AMOXICILLIN 250 MG
2 CAPSULE ORAL 2 TIMES DAILY PRN
Status: DISCONTINUED | OUTPATIENT
Start: 2021-12-22 | End: 2021-12-23 | Stop reason: HOSPADM

## 2021-12-22 RX ORDER — ASPIRIN 81 MG/1
81 TABLET ORAL DAILY
Status: DISCONTINUED | OUTPATIENT
Start: 2021-12-22 | End: 2021-12-23 | Stop reason: HOSPADM

## 2021-12-22 RX ORDER — NICOTINE POLACRILEX 4 MG
15 LOZENGE BUCCAL
Status: DISCONTINUED | OUTPATIENT
Start: 2021-12-22 | End: 2021-12-23 | Stop reason: HOSPADM

## 2021-12-22 RX ORDER — SUCRALFATE 1 G/1
1 TABLET ORAL 4 TIMES DAILY
Status: DISCONTINUED | OUTPATIENT
Start: 2021-12-22 | End: 2021-12-23 | Stop reason: HOSPADM

## 2021-12-22 RX ORDER — SODIUM CHLORIDE 9 MG/ML
INJECTION, SOLUTION INTRAVENOUS CONTINUOUS PRN
Status: DISCONTINUED | OUTPATIENT
Start: 2021-12-22 | End: 2021-12-22 | Stop reason: SURG

## 2021-12-22 RX ORDER — MEPERIDINE HYDROCHLORIDE 25 MG/ML
12.5 INJECTION INTRAMUSCULAR; INTRAVENOUS; SUBCUTANEOUS
Status: DISCONTINUED | OUTPATIENT
Start: 2021-12-22 | End: 2021-12-22 | Stop reason: HOSPADM

## 2021-12-22 RX ORDER — SODIUM CHLORIDE 0.9 % (FLUSH) 0.9 %
1-10 SYRINGE (ML) INJECTION AS NEEDED
Status: DISCONTINUED | OUTPATIENT
Start: 2021-12-22 | End: 2021-12-23 | Stop reason: HOSPADM

## 2021-12-22 RX ORDER — SODIUM CHLORIDE 9 MG/ML
INJECTION, SOLUTION INTRAVENOUS AS NEEDED
Status: DISCONTINUED | OUTPATIENT
Start: 2021-12-22 | End: 2021-12-22 | Stop reason: HOSPADM

## 2021-12-22 RX ORDER — BUPIVACAINE HCL/0.9 % NACL/PF 0.125 %
PLASTIC BAG, INJECTION (ML) EPIDURAL AS NEEDED
Status: DISCONTINUED | OUTPATIENT
Start: 2021-12-22 | End: 2021-12-22 | Stop reason: SURG

## 2021-12-22 RX ORDER — NICARDIPINE HYDROCHLORIDE 2.5 MG/ML
INJECTION INTRAVENOUS AS NEEDED
Status: DISCONTINUED | OUTPATIENT
Start: 2021-12-22 | End: 2021-12-22 | Stop reason: SURG

## 2021-12-22 RX ORDER — ACETAMINOPHEN 325 MG/1
650 TABLET ORAL EVERY 4 HOURS PRN
Status: DISCONTINUED | OUTPATIENT
Start: 2021-12-22 | End: 2021-12-23 | Stop reason: HOSPADM

## 2021-12-22 RX ORDER — ROCURONIUM BROMIDE 10 MG/ML
INJECTION, SOLUTION INTRAVENOUS AS NEEDED
Status: DISCONTINUED | OUTPATIENT
Start: 2021-12-22 | End: 2021-12-22 | Stop reason: SURG

## 2021-12-22 RX ORDER — ONDANSETRON 4 MG/1
4 TABLET, FILM COATED ORAL EVERY 6 HOURS PRN
Status: DISCONTINUED | OUTPATIENT
Start: 2021-12-22 | End: 2021-12-23 | Stop reason: HOSPADM

## 2021-12-22 RX ORDER — IPRATROPIUM BROMIDE AND ALBUTEROL SULFATE 2.5; .5 MG/3ML; MG/3ML
3 SOLUTION RESPIRATORY (INHALATION) ONCE AS NEEDED
Status: COMPLETED | OUTPATIENT
Start: 2021-12-22 | End: 2021-12-22

## 2021-12-22 RX ORDER — ONDANSETRON 2 MG/ML
INJECTION INTRAMUSCULAR; INTRAVENOUS AS NEEDED
Status: DISCONTINUED | OUTPATIENT
Start: 2021-12-22 | End: 2021-12-22 | Stop reason: SURG

## 2021-12-22 RX ORDER — DROPERIDOL 2.5 MG/ML
0.62 INJECTION, SOLUTION INTRAMUSCULAR; INTRAVENOUS ONCE AS NEEDED
Status: DISCONTINUED | OUTPATIENT
Start: 2021-12-22 | End: 2021-12-22 | Stop reason: HOSPADM

## 2021-12-22 RX ORDER — CARVEDILOL 6.25 MG/1
6.25 TABLET ORAL EVERY 12 HOURS SCHEDULED
Status: DISCONTINUED | OUTPATIENT
Start: 2021-12-22 | End: 2021-12-23 | Stop reason: HOSPADM

## 2021-12-22 RX ORDER — VANCOMYCIN HYDROCHLORIDE 1 G/200ML
15 INJECTION, SOLUTION INTRAVENOUS EVERY 12 HOURS
Status: COMPLETED | OUTPATIENT
Start: 2021-12-22 | End: 2021-12-23

## 2021-12-22 RX ORDER — MIDAZOLAM HYDROCHLORIDE 1 MG/ML
1 INJECTION INTRAMUSCULAR; INTRAVENOUS
Status: DISCONTINUED | OUTPATIENT
Start: 2021-12-22 | End: 2021-12-22 | Stop reason: HOSPADM

## 2021-12-22 RX ORDER — DROPERIDOL 2.5 MG/ML
0.62 INJECTION, SOLUTION INTRAMUSCULAR; INTRAVENOUS AS NEEDED
Status: DISCONTINUED | OUTPATIENT
Start: 2021-12-22 | End: 2021-12-22 | Stop reason: HOSPADM

## 2021-12-22 RX ORDER — FAMOTIDINE 20 MG/1
20 TABLET, FILM COATED ORAL ONCE
Status: COMPLETED | OUTPATIENT
Start: 2021-12-22 | End: 2021-12-22

## 2021-12-22 RX ORDER — SODIUM CHLORIDE, SODIUM LACTATE, POTASSIUM CHLORIDE, CALCIUM CHLORIDE 600; 310; 30; 20 MG/100ML; MG/100ML; MG/100ML; MG/100ML
9 INJECTION, SOLUTION INTRAVENOUS CONTINUOUS
Status: DISCONTINUED | OUTPATIENT
Start: 2021-12-22 | End: 2021-12-23 | Stop reason: HOSPADM

## 2021-12-22 RX ORDER — FENTANYL CITRATE 50 UG/ML
INJECTION, SOLUTION INTRAMUSCULAR; INTRAVENOUS AS NEEDED
Status: DISCONTINUED | OUTPATIENT
Start: 2021-12-22 | End: 2021-12-22 | Stop reason: SURG

## 2021-12-22 RX ADMIN — INSULIN DETEMIR 15 UNITS: 100 INJECTION, SOLUTION SUBCUTANEOUS at 20:30

## 2021-12-22 RX ADMIN — HYDROCODONE BITARTRATE AND ACETAMINOPHEN 1 TABLET: 7.5; 325 TABLET ORAL at 13:14

## 2021-12-22 RX ADMIN — Medication 100 MCG: at 08:10

## 2021-12-22 RX ADMIN — NICARDIPINE HYDROCHLORIDE 5 MG/HR: 25 INJECTION, SOLUTION INTRAVENOUS at 21:23

## 2021-12-22 RX ADMIN — PROPOFOL 25 MCG/KG/MIN: 10 INJECTION, EMULSION INTRAVENOUS at 07:20

## 2021-12-22 RX ADMIN — EPHEDRINE SULFATE 10 MG: 50 INJECTION INTRAVENOUS at 08:00

## 2021-12-22 RX ADMIN — EPHEDRINE SULFATE 5 MG: 50 INJECTION INTRAVENOUS at 07:50

## 2021-12-22 RX ADMIN — FAMOTIDINE 20 MG: 20 TABLET ORAL at 06:08

## 2021-12-22 RX ADMIN — FENTANYL CITRATE 100 MCG: 50 INJECTION, SOLUTION INTRAMUSCULAR; INTRAVENOUS at 07:12

## 2021-12-22 RX ADMIN — Medication 100 MCG: at 08:02

## 2021-12-22 RX ADMIN — Medication 100 MCG: at 07:18

## 2021-12-22 RX ADMIN — LIDOCAINE HYDROCHLORIDE 50 MG: 10 INJECTION, SOLUTION EPIDURAL; INFILTRATION; INTRACAUDAL; PERINEURAL at 07:12

## 2021-12-22 RX ADMIN — VANCOMYCIN HYDROCHLORIDE 1000 MG: 1 INJECTION, SOLUTION INTRAVENOUS at 20:09

## 2021-12-22 RX ADMIN — NICARDIPINE HYDROCHLORIDE 0.3 MG: 25 INJECTION INTRAVENOUS at 07:40

## 2021-12-22 RX ADMIN — HEPARIN SODIUM 5000 UNITS: 1000 INJECTION, SOLUTION INTRAVENOUS; SUBCUTANEOUS at 07:27

## 2021-12-22 RX ADMIN — MIRABEGRON 25 MG: 25 TABLET, FILM COATED, EXTENDED RELEASE ORAL at 15:03

## 2021-12-22 RX ADMIN — LOSARTAN POTASSIUM 50 MG: 50 TABLET, FILM COATED ORAL at 13:08

## 2021-12-22 RX ADMIN — FENTANYL CITRATE 50 MCG: 50 INJECTION INTRAMUSCULAR; INTRAVENOUS at 12:00

## 2021-12-22 RX ADMIN — VENLAFAXINE 37.5 MG: 37.5 TABLET ORAL at 17:16

## 2021-12-22 RX ADMIN — VANCOMYCIN HYDROCHLORIDE 987 MG: 1 INJECTION, SOLUTION INTRAVENOUS at 07:08

## 2021-12-22 RX ADMIN — IPRATROPIUM BROMIDE AND ALBUTEROL SULFATE 3 ML: 2.5; .5 SOLUTION RESPIRATORY (INHALATION) at 09:05

## 2021-12-22 RX ADMIN — INSULIN LISPRO 8 UNITS: 100 INJECTION, SOLUTION INTRAVENOUS; SUBCUTANEOUS at 17:16

## 2021-12-22 RX ADMIN — SODIUM CHLORIDE, POTASSIUM CHLORIDE, SODIUM LACTATE AND CALCIUM CHLORIDE: 600; 310; 30; 20 INJECTION, SOLUTION INTRAVENOUS at 07:05

## 2021-12-22 RX ADMIN — FENTANYL CITRATE 50 MCG: 50 INJECTION, SOLUTION INTRAMUSCULAR; INTRAVENOUS at 12:00

## 2021-12-22 RX ADMIN — DEXAMETHASONE SODIUM PHOSPHATE 4 MG: 4 INJECTION, SOLUTION INTRA-ARTICULAR; INTRALESIONAL; INTRAMUSCULAR; INTRAVENOUS; SOFT TISSUE at 07:29

## 2021-12-22 RX ADMIN — SODIUM CHLORIDE, POTASSIUM CHLORIDE, SODIUM LACTATE AND CALCIUM CHLORIDE 9 ML/HR: 600; 310; 30; 20 INJECTION, SOLUTION INTRAVENOUS at 06:08

## 2021-12-22 RX ADMIN — ROCURONIUM BROMIDE 50 MG: 10 INJECTION, SOLUTION INTRAVENOUS at 07:12

## 2021-12-22 RX ADMIN — FENTANYL CITRATE 50 MCG: 50 INJECTION INTRAMUSCULAR; INTRAVENOUS at 11:40

## 2021-12-22 RX ADMIN — ASPIRIN 81 MG: 81 TABLET, COATED ORAL at 13:08

## 2021-12-22 RX ADMIN — ONDANSETRON 4 MG: 2 INJECTION INTRAMUSCULAR; INTRAVENOUS at 07:29

## 2021-12-22 RX ADMIN — PHENYLEPHRINE HYDROCHLORIDE 0.5 MCG/KG/MIN: 10 INJECTION INTRAVENOUS at 07:16

## 2021-12-22 RX ADMIN — EZETIMIBE 10 MG: 10 TABLET ORAL at 15:04

## 2021-12-22 RX ADMIN — CARVEDILOL 6.25 MG: 6.25 TABLET, FILM COATED ORAL at 21:43

## 2021-12-22 RX ADMIN — SUCRALFATE 1 G: 1 TABLET ORAL at 21:23

## 2021-12-22 RX ADMIN — ATORVASTATIN CALCIUM 10 MG: 10 TABLET, FILM COATED ORAL at 20:30

## 2021-12-22 RX ADMIN — EPHEDRINE SULFATE 5 MG: 50 INJECTION INTRAVENOUS at 07:47

## 2021-12-22 RX ADMIN — NICARDIPINE HYDROCHLORIDE 5 MG/HR: 0.1 INJECTION, SOLUTION INTRAVENOUS at 07:40

## 2021-12-22 RX ADMIN — HYDROCODONE BITARTRATE AND ACETAMINOPHEN 1 TABLET: 7.5; 325 TABLET ORAL at 20:09

## 2021-12-22 RX ADMIN — Medication 100 MCG: at 07:45

## 2021-12-22 RX ADMIN — LIDOCAINE HYDROCHLORIDE 0.5 ML: 10 INJECTION, SOLUTION EPIDURAL; INFILTRATION; INTRACAUDAL; PERINEURAL at 06:08

## 2021-12-22 RX ADMIN — SODIUM CHLORIDE 30 ML/HR: 4.5 INJECTION, SOLUTION INTRAVENOUS at 13:09

## 2021-12-22 RX ADMIN — INSULIN LISPRO 4 UNITS: 100 INJECTION, SOLUTION INTRAVENOUS; SUBCUTANEOUS at 15:06

## 2021-12-22 RX ADMIN — SODIUM CHLORIDE, PRESERVATIVE FREE 3 ML: 5 INJECTION INTRAVENOUS at 21:16

## 2021-12-22 RX ADMIN — SUCRALFATE 1 G: 1 TABLET ORAL at 17:16

## 2021-12-22 RX ADMIN — PROPOFOL 100 MG: 10 INJECTION, EMULSION INTRAVENOUS at 07:12

## 2021-12-22 RX ADMIN — SERTRALINE HYDROCHLORIDE 50 MG: 50 TABLET ORAL at 13:08

## 2021-12-22 RX ADMIN — SODIUM CHLORIDE: 9 INJECTION, SOLUTION INTRAVENOUS at 07:09

## 2021-12-22 RX ADMIN — SUGAMMADEX 200 MG: 100 INJECTION, SOLUTION INTRAVENOUS at 07:56

## 2021-12-22 RX ADMIN — PROTAMINE SULFATE 25 MG: 10 INJECTION, SOLUTION INTRAVENOUS at 07:49

## 2021-12-22 RX ADMIN — PANTOPRAZOLE SODIUM 40 MG: 40 TABLET, DELAYED RELEASE ORAL at 13:08

## 2021-12-22 NOTE — ANESTHESIA PREPROCEDURE EVALUATION
Anesthesia Evaluation     Patient summary reviewed and Nursing notes reviewed   history of anesthetic complications: PONV  NPO Solid Status: > 8 hours  NPO Liquid Status: > 8 hours           Airway   Mallampati: II  TM distance: >3 FB  Neck ROM: full  No difficulty expected  Dental - normal exam     Pulmonary - negative pulmonary ROS and normal exam   Cardiovascular - normal exam    ECG reviewed    (+) hypertension, hyperlipidemia,  carotid artery disease  (-) dysrhythmias, angina      Neuro/Psych  (+) dizziness/light headedness,     GI/Hepatic/Renal/Endo    (+)  GERD well controlled,  diabetes mellitus, thyroid problem hypothyroidism    Musculoskeletal     Abdominal    Substance History      OB/GYN          Other                      Anesthesia Plan    ASA 3     general   ([place])  intravenous induction     Anesthetic plan, all risks, benefits, and alternatives have been provided, discussed and informed consent has been obtained with: patient.    Plan discussed with CRNA.

## 2021-12-22 NOTE — ANESTHESIA POSTPROCEDURE EVALUATION
Patient: Shelley Leach    Procedure Summary     Date: 12/22/21 Room / Location:  [facility]  /  [facility]    Anesthesia Start: 0700 Anesthesia Stop: 0831    Procedure: CAROTID ENDARTERECTOMY WITH EEG RIGHT (Right Neck) Diagnosis:       Bilateral carotid artery stenosis      (Bilateral carotid artery stenosis [I65.23])    Surgeons: Javed Morris MD Provider: Judith Mann MD    Anesthesia Type: general ASA Status: 3          Anesthesia Type: general    Vitals  Vitals Value Taken Time   BP 92/49 12/22/21 0826   Temp     Pulse 56 12/22/21 0830   Resp     SpO2 93 % 12/22/21 0830   Vitals shown include unvalidated device data.        Post Anesthesia Care and Evaluation    Patient location during evaluation: PACU  Patient participation: complete - patient participated  Level of consciousness: awake and alert  Pain management: adequate  Airway patency: patent  Anesthetic complications: No anesthetic complications  PONV Status: none  Cardiovascular status: hemodynamically stable and acceptable  Respiratory status: nonlabored ventilation, acceptable and nasal cannula  Hydration status: acceptable

## 2021-12-22 NOTE — ANESTHESIA PROCEDURE NOTES
Arterial Line      Patient reassessed immediately prior to procedure    Patient location during procedure: pre-op  Start time: 12/22/2021 6:35 AM   Line placed for hemodynamic monitoring, MD/Surgeon request and ABGs/Labs/ISTAT.  Performed By   Anesthesiologist: Judith Mann MD  Preanesthetic Checklist  Completed: patient identified, IV checked, site marked, risks and benefits discussed, surgical consent, monitors and equipment checked, pre-op evaluation and timeout performed  Arterial Line Prep   Sterile Tech: cap, gloves, mask and sterile barriers  Prep: ChloraPrep  Patient monitoring: blood pressure monitoring, continuous pulse oximetry and EKG  Arterial Line Procedure   Laterality:right  Location:  radial artery  Catheter size: 20 G   Guidance: ultrasound guided  PROCEDURE NOTE/ULTRASOUND INTERPRETATION.  Using ultrasound guidance the potential vascular sites for insertion of the catheter were visualized to determine the patency of the vessel to be used for vascular access.  After selecting the appropriate site for insertion, the needle was visualized under ultrasound being inserted into the radial artery, followed by ultrasound confirmation of wire and catheter placement. There were no abnormalities seen on ultrasound; an image was taken; and the patient tolerated the procedure with no complications.   Number of attempts: 1  Successful placement: yes  Post Assessment   Dressing Type: biopatch applied, line sutured, occlusive dressing applied, secured with tape and wrist guard applied.   Complications no  Circ/Move/Sens Assessment: normal.   Patient Tolerance: patient tolerated the procedure well with no apparent complications

## 2021-12-22 NOTE — ANESTHESIA PROCEDURE NOTES
Airway  Urgency: elective    Date/Time: 12/22/2021 7:13 AM  Airway not difficult    General Information and Staff    Patient location during procedure: OR  CRNA: Alexi Gonzalez III, CRNA    Indications and Patient Condition  Indications for airway management: airway protection    Preoxygenated: yes  MILS not maintained throughout  Mask difficulty assessment: 1 - vent by mask    Final Airway Details  Final airway type: endotracheal airway      Successful airway: ETT  Cuffed: yes   Successful intubation technique: direct laryngoscopy  Blade: Mat  Blade size: 3  ETT size (mm): 7.0  Cormack-Lehane Classification: grade I - full view of glottis  Placement verified by: chest auscultation and capnometry   Measured from: lips  ETT/EBT  to lips (cm): 21  Number of attempts at approach: 1  Assessment: lips, teeth, and gum same as pre-op and atraumatic intubation    Additional Comments  Negative epigastric sounds, Breath sound equal bilaterally with symmetric chest rise and fall.

## 2021-12-23 VITALS
OXYGEN SATURATION: 90 % | DIASTOLIC BLOOD PRESSURE: 63 MMHG | RESPIRATION RATE: 18 BRPM | BODY MASS INDEX: 27.19 KG/M2 | HEART RATE: 69 BPM | WEIGHT: 144 LBS | TEMPERATURE: 97.8 F | SYSTOLIC BLOOD PRESSURE: 133 MMHG | HEIGHT: 61 IN

## 2021-12-23 LAB
ANION GAP SERPL CALCULATED.3IONS-SCNC: 10 MMOL/L (ref 5–15)
BASOPHILS # BLD AUTO: 0.04 10*3/MM3 (ref 0–0.2)
BASOPHILS NFR BLD AUTO: 0.4 % (ref 0–1.5)
BUN SERPL-MCNC: 26 MG/DL (ref 8–23)
BUN/CREAT SERPL: 26.8 (ref 7–25)
CALCIUM SPEC-SCNC: 8.4 MG/DL (ref 8.6–10.5)
CHLORIDE SERPL-SCNC: 100 MMOL/L (ref 98–107)
CO2 SERPL-SCNC: 24 MMOL/L (ref 22–29)
CREAT SERPL-MCNC: 0.97 MG/DL (ref 0.57–1)
CYTO UR: NORMAL
DEPRECATED RDW RBC AUTO: 39.5 FL (ref 37–54)
EOSINOPHIL # BLD AUTO: 0 10*3/MM3 (ref 0–0.4)
EOSINOPHIL NFR BLD AUTO: 0 % (ref 0.3–6.2)
ERYTHROCYTE [DISTWIDTH] IN BLOOD BY AUTOMATED COUNT: 11.5 % (ref 12.3–15.4)
GFR SERPL CREATININE-BSD FRML MDRD: 58 ML/MIN/1.73
GLUCOSE BLDC GLUCOMTR-MCNC: 192 MG/DL (ref 70–130)
GLUCOSE SERPL-MCNC: 301 MG/DL (ref 65–99)
HCT VFR BLD AUTO: 32.9 % (ref 34–46.6)
HGB BLD-MCNC: 10.9 G/DL (ref 12–15.9)
IMM GRANULOCYTES # BLD AUTO: 0.03 10*3/MM3 (ref 0–0.05)
IMM GRANULOCYTES NFR BLD AUTO: 0.3 % (ref 0–0.5)
LAB AP CASE REPORT: NORMAL
LAB AP CLINICAL INFORMATION: NORMAL
LYMPHOCYTES # BLD AUTO: 1.36 10*3/MM3 (ref 0.7–3.1)
LYMPHOCYTES NFR BLD AUTO: 13.8 % (ref 19.6–45.3)
MCH RBC QN AUTO: 31.3 PG (ref 26.6–33)
MCHC RBC AUTO-ENTMCNC: 33.1 G/DL (ref 31.5–35.7)
MCV RBC AUTO: 94.5 FL (ref 79–97)
MONOCYTES # BLD AUTO: 0.54 10*3/MM3 (ref 0.1–0.9)
MONOCYTES NFR BLD AUTO: 5.5 % (ref 5–12)
NEUTROPHILS NFR BLD AUTO: 7.86 10*3/MM3 (ref 1.7–7)
NEUTROPHILS NFR BLD AUTO: 80 % (ref 42.7–76)
NRBC BLD AUTO-RTO: 0 /100 WBC (ref 0–0.2)
PATH REPORT.FINAL DX SPEC: NORMAL
PATH REPORT.GROSS SPEC: NORMAL
PLATELET # BLD AUTO: 196 10*3/MM3 (ref 140–450)
PMV BLD AUTO: 11.4 FL (ref 6–12)
POTASSIUM SERPL-SCNC: 4.5 MMOL/L (ref 3.5–5.2)
RBC # BLD AUTO: 3.48 10*6/MM3 (ref 3.77–5.28)
SODIUM SERPL-SCNC: 134 MMOL/L (ref 136–145)
WBC NRBC COR # BLD: 9.83 10*3/MM3 (ref 3.4–10.8)

## 2021-12-23 PROCEDURE — 25010000002 VANCOMYCIN PER 500 MG: Performed by: STUDENT IN AN ORGANIZED HEALTH CARE EDUCATION/TRAINING PROGRAM

## 2021-12-23 PROCEDURE — 99024 POSTOP FOLLOW-UP VISIT: CPT | Performed by: THORACIC SURGERY (CARDIOTHORACIC VASCULAR SURGERY)

## 2021-12-23 PROCEDURE — 85025 COMPLETE CBC W/AUTO DIFF WBC: CPT | Performed by: STUDENT IN AN ORGANIZED HEALTH CARE EDUCATION/TRAINING PROGRAM

## 2021-12-23 PROCEDURE — 82962 GLUCOSE BLOOD TEST: CPT

## 2021-12-23 PROCEDURE — 99232 SBSQ HOSP IP/OBS MODERATE 35: CPT | Performed by: INTERNAL MEDICINE

## 2021-12-23 PROCEDURE — 63710000001 INSULIN LISPRO (HUMAN) PER 5 UNITS: Performed by: STUDENT IN AN ORGANIZED HEALTH CARE EDUCATION/TRAINING PROGRAM

## 2021-12-23 PROCEDURE — 80048 BASIC METABOLIC PNL TOTAL CA: CPT | Performed by: STUDENT IN AN ORGANIZED HEALTH CARE EDUCATION/TRAINING PROGRAM

## 2021-12-23 RX ORDER — CLOPIDOGREL BISULFATE 75 MG/1
75 TABLET ORAL DAILY
Status: DISCONTINUED | OUTPATIENT
Start: 2021-12-23 | End: 2021-12-23 | Stop reason: HOSPADM

## 2021-12-23 RX ORDER — CLOPIDOGREL BISULFATE 75 MG/1
75 TABLET ORAL DAILY
Qty: 30 TABLET | Refills: 6 | Status: SHIPPED | OUTPATIENT
Start: 2021-12-23 | End: 2022-04-14 | Stop reason: SDUPTHER

## 2021-12-23 RX ADMIN — ASPIRIN 81 MG: 81 TABLET, COATED ORAL at 08:53

## 2021-12-23 RX ADMIN — LOSARTAN POTASSIUM 50 MG: 50 TABLET, FILM COATED ORAL at 08:53

## 2021-12-23 RX ADMIN — LEVOTHYROXINE SODIUM 100 MCG: 0.1 TABLET ORAL at 05:46

## 2021-12-23 RX ADMIN — SERTRALINE HYDROCHLORIDE 50 MG: 50 TABLET ORAL at 08:53

## 2021-12-23 RX ADMIN — HYDROCODONE BITARTRATE AND ACETAMINOPHEN 1 TABLET: 7.5; 325 TABLET ORAL at 08:53

## 2021-12-23 RX ADMIN — SUCRALFATE 1 G: 1 TABLET ORAL at 08:53

## 2021-12-23 RX ADMIN — CLOPIDOGREL BISULFATE 75 MG: 75 TABLET ORAL at 08:53

## 2021-12-23 RX ADMIN — SODIUM CHLORIDE, PRESERVATIVE FREE 3 ML: 5 INJECTION INTRAVENOUS at 08:55

## 2021-12-23 RX ADMIN — VANCOMYCIN HYDROCHLORIDE 1000 MG: 1 INJECTION, SOLUTION INTRAVENOUS at 08:56

## 2021-12-23 RX ADMIN — PANTOPRAZOLE SODIUM 40 MG: 40 TABLET, DELAYED RELEASE ORAL at 08:53

## 2021-12-23 RX ADMIN — CARVEDILOL 6.25 MG: 6.25 TABLET, FILM COATED ORAL at 08:53

## 2021-12-23 RX ADMIN — VENLAFAXINE 37.5 MG: 37.5 TABLET ORAL at 08:53

## 2021-12-23 RX ADMIN — INSULIN LISPRO 2 UNITS: 100 INJECTION, SOLUTION INTRAVENOUS; SUBCUTANEOUS at 10:42

## 2021-12-23 RX ADMIN — HYDROCODONE BITARTRATE AND ACETAMINOPHEN 1 TABLET: 7.5; 325 TABLET ORAL at 02:42

## 2021-12-24 ENCOUNTER — READMISSION MANAGEMENT (OUTPATIENT)
Dept: CALL CENTER | Facility: HOSPITAL | Age: 66
End: 2021-12-24

## 2021-12-24 NOTE — OUTREACH NOTE
Prep Survey      Responses   [facility] facility patient discharged from? [facility]   Is LACE score < 7 ? No   Emergency Room discharge w/ pulse ox? No   Eligibility Readm Mgmt   Discharge diagnosis Right carotid stenosis   Does the patient have one of the following disease processes/diagnoses(primary or secondary)? Other   Does the patient have Home health ordered? No   Is there a DME ordered? No   Prep survey completed? Yes          Telma Correa RN

## 2021-12-28 ENCOUNTER — READMISSION MANAGEMENT (OUTPATIENT)
Dept: CALL CENTER | Facility: HOSPITAL | Age: 66
End: 2021-12-28

## 2021-12-28 NOTE — OUTREACH NOTE
Medical Week 1 Survey      Responses   [facility] [place] patient discharged from? [place]   Does the patient have one of the following disease processes/diagnoses(primary or secondary)? Other   Week 1 attempt successful? No   Unsuccessful attempts Attempt 1          Lucia Lorenzo RN

## 2021-12-29 ENCOUNTER — READMISSION MANAGEMENT (OUTPATIENT)
Dept: CALL CENTER | Facility: HOSPITAL | Age: 66
End: 2021-12-29

## 2021-12-29 NOTE — OUTREACH NOTE
Medical Week 1 Survey      Responses   [facility] patient discharged from? [place]   Does the patient have one of the following disease processes/diagnoses(primary or secondary)? Other   Week 1 attempt successful? No   Unsuccessful attempts Attempt 2          Natalie West RN

## 2022-01-04 ENCOUNTER — READMISSION MANAGEMENT (OUTPATIENT)
Dept: CALL CENTER | Facility: HOSPITAL | Age: 67
End: 2022-01-04

## 2022-01-04 NOTE — OUTREACH NOTE
Medical Week 1 Survey      Responses   [facility] patient discharged from? [place]   Does the patient have one of the following disease processes/diagnoses(primary or secondary)? Other   Week 1 attempt successful? Yes   Call start time 0808   Call end time 0811   Discharge diagnosis Right carotid stenosis   Meds reviewed with patient/caregiver? Yes   Is the patient having any side effects they believe may be caused by any medication additions or changes? No   Does the patient have all medications ordered at discharge? Yes   Is the patient taking all medications as directed (includes completed medication regime)? Yes   Does the patient have a primary care provider?  Yes   Does the patient have an appointment with their PCP within 7 days of discharge? Greater than 7 days   What is preventing the patient from scheduling follow up appointments within 7 days of discharge? Earlier appointment not available   Nursing Interventions Verified appointment date/time/provider   Has the patient kept scheduled appointments due by today? N/A   Has home health visited the patient within 72 hours of discharge? N/A   Psychosocial issues? No   Did the patient receive a copy of their discharge instructions? Yes   What is the patient's perception of their health status since discharge? Improving   Is the patient/caregiver able to teach back the hierarchy of who to call/visit for symptoms/problems? PCP, Specialist, Home health nurse, Urgent Care, ED, 911 Yes   If the patient is a current smoker, are they able to teach back resources for cessation? Not a smoker   Week 1 call completed? Yes   Graduated Yes   Is the patient interested in additional calls from an ambulatory ?  NOTE:  applies to high risk patients requiring additional follow-up. No   Did the patient feel the follow up calls were helpful during their recovery period? Yes   Was the number of calls appropriate? Yes   Graduated/Revoked comments Doing well, no  needs.          Nelly Pedraza RN

## 2022-01-10 NOTE — DISCHARGE SUMMARY
CTS Discharge Summary    Patient Care Team:  Tom Salas MD as PCP - General  Tom Salas MD as PCP - Family Medicine  Javed Morris MD as Referring Physician (Cardiothoracic Surgery)  Sonu Talbot MD as Consulting Physician (Endocrinology)  Brice Cooley MD as Cardiologist (Internal Medicine)  Consults:   Consults     No orders found from 11/23/2021 to 12/23/2021.          Date of Admission: 12/22/2021  5:34 AM  Date of Discharge:  12/23/2021    Discharge Diagnosis  Past Medical History:   Diagnosis Date   • Clostridioides difficile infection     15 YEARS AGO, TREATED   • Dietary counseling     diet education-Abstracted from [facility]   • Dietary counseling and surveillance    • Diverticulosis    • Elevated cholesterol    • GERD (gastroesophageal reflux disease)    • History of thyroid disorder     Thyroid problem-Abstracted from [facility]   • History of transfusion     NO REACTION   • Hypertension    • Hypothyroidism    • Leaky heart valve 2021   • PONV (postoperative nausea and vomiting)    • Type 2 diabetes mellitus, uncontrolled (HCC)      Bilateral carotid artery stenosis [I65.23]     Procedures Performed  Procedure(s):  CAROTID ENDARTERECTOMY WITH EEG RIGHT     Operative Pathology:   Final Diagnosis   RIGHT CAROTID ARTERY CONTENTS:                 Severe fibrocalcific atherosclerosis     History of Present Illness  Shelley Leach is a 66 y.o. female with a past medical history of T2DM, HLD, and hypothyroidism. She presented to [facility] on 11/9/2021 for a scheduled diagnostic angiogram with Dr. Martinez results are pending. She has been having episodes of dizziness for the past 3-4 months. She denies any numbness, weakness, problems with speech, or changes in vision. She was last seen in our office on 7/12/2021 with Dr. Morris.    Hospital Course  Patient presented to the operating room on 12/22/2021 for  Right internal carotid endarterectomy with pericardial patch  closure and EEG monitoring. The patient was transported to recovery in stable condition where a neurological exam was performed and found to be comparable to baseline.     POD1 Campbell-Briscoe drain discontinued. No neurological deficits noted on exam. The patient met discharge criteria and was discharged to home      Discharge Medications     Discharge Medications      New Medications      Instructions Start Date   clopidogrel 75 MG tablet  Commonly known as: PLAVIX   75 mg, Oral, Daily         Changes to Medications      Instructions Start Date   NovoLOG FlexPen 100 UNIT/ML solution pen-injector sc pen  Generic drug: insulin aspart  What changed:   · how much to take  · how to take this   10 units daily at supper      Toujeo SoloStar 300 UNIT/ML solution pen-injector injection  Generic drug: Insulin Glargine (1 Unit Dial)  What changed: how much to take   14 Units, Subcutaneous, Daily         Continue These Medications      Instructions Start Date   Alcohol Wipes 70 % pads   As Needed      aspirin 81 MG EC tablet   81 mg, Oral, Daily      atorvastatin 10 MG tablet  Commonly known as: LIPITOR   10 mg, Oral, Nightly      BARIATRIC FUSION PO   1 tablet, Oral, 2 times daily      BARIATRIC FUSION PO   1 tablet, Oral, Daily, Hair skin and nails      BD Pen Needle Melba 2nd Gen 32G X 4 MM misc  Generic drug: Insulin Pen Needle   USE DAILY      Biotin 02988 MCG tablet dispersible   10,000 mcg, Translingual, 2 times daily      carvedilol 6.25 MG tablet  Commonly known as: COREG   6.25 mg, Oral, Every 12 Hours      ezetimibe 10 MG tablet  Commonly known as: ZETIA   10 mg, Oral, Daily      fenofibrate 145 MG tablet  Commonly known as: TRICOR   145 mg, Oral, Daily      ferrous sulfate 300 (60 Fe) MG/5ML syrup   300 mg, Oral, Daily With Breakfast      FIBER-CAPS PO   2 capsules, Oral, Daily, 2 tablets daily      fluconazole 150 MG tablet  Commonly known as: DIFLUCAN   150 mg, Oral, Once As Needed, As needed      gabapentin 300 MG  capsule  Commonly known as: NEURONTIN   300 mg, Oral, Nightly      gabapentin 100 MG capsule  Commonly known as: NEURONTIN   100 mg, Oral, Daily, 1 am      glucose blood test strip   Daily      losartan 50 MG tablet  Commonly known as: COZAAR   50 mg, Oral, Daily      Myrbetriq 25 MG tablet sustained-release 24 hour 24 hr tablet  Generic drug: Mirabegron ER   25 mg, Oral, Daily      ondansetron 4 MG tablet  Commonly known as: ZOFRAN   4 mg, Oral, Every 8 Hours PRN, As needed      pantoprazole 40 MG EC tablet  Commonly known as: PROTONIX   40 mg, Oral, Daily      sertraline 50 MG tablet  Commonly known as: ZOLOFT   50 mg, Daily      sucralfate 1 g tablet  Commonly known as: CARAFATE   1 g, Oral, 4 Times Daily      Synthroid 100 MCG tablet  Generic drug: levothyroxine   100 mcg, Daily      TRUBIOTICS PO   1 capsule, Oral, Daily      venlafaxine 37.5 MG tablet  Commonly known as: EFFEXOR   37.5 mg, Oral, 2 Times Daily      vitamin C 250 MG tablet  Commonly known as: ASCORBIC ACID   250 mg, Oral, Daily      vitamin D3 125 MCG (5000 UT) capsule capsule   10,000 Units, Oral, Daily      ZyrTEC Allergy 10 MG tablet  Generic drug: cetirizine   10 mg, Oral, Daily             Discharge Diet:   Diet Instructions     Diet: Cardiac, Consistent Carbohydrate      Discharge Diet:  Cardiac  Consistent Carbohydrate             Activity at Discharge:   Activity Instructions     Bathing Restrictions      Do not submerge incisions in bathtub, swimming pools, hot tub, or any body of water. You may ONLY shower and allow water to run over incisions, then pat dry.    Type of Restriction: Bathing    Bathing Restrictions: No Tub Bath    Driving Restrictions      Type of Restriction: Driving    Driving Restrictions: No Driving Until Next Appointment    Lifting Restrictions      Type of Restriction: Lifting    Lifting Restrictions: Lifting Restriction (Indicate Limit)    Weight Limit (Pounds): 10    Length of Lifting Restriction: until next  appointment          Follow-up Appointments  Future Appointments   Date Time Provider Department Center   1/17/2022  9:30 AM Summer Anthony APRN [facility]   2/15/2022  9:15 AM Sonu Talbot MD [facility]        WOUND CARE:  Monitor surgical wounds daily. Keep incisons clean and dry.   Call CT Surgery office [phone]  with any questions or concerns, specifically let them know of increased redness, drainage, or opening up of incision.       Manuela Stephens PA-C  01/10/22  08:10 EST

## 2022-01-14 PROBLEM — N18.30 CKD STAGE 3 DUE TO TYPE 2 DIABETES MELLITUS: Status: ACTIVE | Noted: 2018-03-14

## 2022-01-14 PROBLEM — E11.22 CKD STAGE 3 DUE TO TYPE 2 DIABETES MELLITUS: Status: ACTIVE | Noted: 2018-03-14

## 2022-01-14 PROBLEM — E55.9 VITAMIN D DEFICIENCY: Status: ACTIVE | Noted: 2018-03-14

## 2022-01-14 PROBLEM — I65.29 STENOSIS OF CAROTID ARTERY: Status: ACTIVE | Noted: 2022-01-14

## 2022-01-17 ENCOUNTER — OFFICE VISIT (OUTPATIENT)
Dept: CARDIAC SURGERY | Facility: CLINIC | Age: 67
End: 2022-01-17

## 2022-01-17 VITALS
BODY MASS INDEX: 28.43 KG/M2 | HEIGHT: 60 IN | WEIGHT: 144.8 LBS | DIASTOLIC BLOOD PRESSURE: 64 MMHG | TEMPERATURE: 97.3 F | OXYGEN SATURATION: 99 % | HEART RATE: 77 BPM | SYSTOLIC BLOOD PRESSURE: 93 MMHG

## 2022-01-17 DIAGNOSIS — Z98.890 S/P CAROTID ENDARTERECTOMY: Primary | ICD-10-CM

## 2022-01-17 PROCEDURE — 99024 POSTOP FOLLOW-UP VISIT: CPT | Performed by: NURSE PRACTITIONER

## 2022-02-15 ENCOUNTER — OFFICE VISIT (OUTPATIENT)
Dept: ENDOCRINOLOGY | Facility: CLINIC | Age: 67
End: 2022-02-15

## 2022-02-15 ENCOUNTER — OFFICE VISIT (OUTPATIENT)
Dept: DIABETES SERVICES | Facility: HOSPITAL | Age: 67
End: 2022-02-15

## 2022-02-15 VITALS
SYSTOLIC BLOOD PRESSURE: 110 MMHG | HEIGHT: 60 IN | WEIGHT: 145 LBS | DIASTOLIC BLOOD PRESSURE: 68 MMHG | OXYGEN SATURATION: 100 % | HEART RATE: 68 BPM | BODY MASS INDEX: 28.47 KG/M2

## 2022-02-15 DIAGNOSIS — E11.65 UNCONTROLLED TYPE 2 DIABETES MELLITUS WITH HYPERGLYCEMIA: Primary | ICD-10-CM

## 2022-02-15 PROCEDURE — 99214 OFFICE O/P EST MOD 30 MIN: CPT | Performed by: INTERNAL MEDICINE

## 2022-02-15 PROCEDURE — G0108 DIAB MANAGE TRN  PER INDIV: HCPCS

## 2022-02-15 RX ORDER — FAMOTIDINE 40 MG/1
TABLET, FILM COATED ORAL
COMMUNITY
Start: 2022-02-07

## 2022-02-15 RX ORDER — SUB-Q INSULIN DEVICE, 40 UNIT
EACH MISCELLANEOUS
Qty: 30 EACH | Refills: 11 | Status: SHIPPED | OUTPATIENT
Start: 2022-02-15 | End: 2023-02-27

## 2022-02-15 RX ORDER — ESTRADIOL 0.1 MG/G
CREAM VAGINAL
COMMUNITY
Start: 2022-01-24

## 2022-02-15 RX ORDER — DIPHENOXYLATE HYDROCHLORIDE AND ATROPINE SULFATE 2.5; .025 MG/1; MG/1
TABLET ORAL
COMMUNITY
Start: 2021-12-28

## 2022-02-15 RX ORDER — NEOMYCIN SULFATE 500 MG/1
500 TABLET ORAL 3 TIMES DAILY
COMMUNITY
Start: 2022-01-26 | End: 2022-02-24

## 2022-02-15 RX ORDER — ICOSAPENT ETHYL 1000 MG/1
2 CAPSULE ORAL EVERY 12 HOURS
COMMUNITY
Start: 2021-12-28 | End: 2022-02-24

## 2022-02-15 NOTE — CONSULTS
Diabetes Education    Patient Name:  Shelley Leach  YOB: 1955  MRN: [id number]  Admit Date:  (Not on file)        Pt attended initial diabetes education w/ focus on new Vgo training and start as well as training on Freestyle Lissa 2. Walk in visit at request of provider, referral noted, 60 min visit with RN, BETTY. Please see media tab for full assessment and notes. Thank you for the referral!      Electronically signed by:  Riddhi Matos RN, EBTTY  02/15/22 11:45 EST

## 2022-02-15 NOTE — PROGRESS NOTES
"     Office Note      Date: 02/15/2022  Patient Name: Shelley Leach  MRN: [id number]  : 1955    Chief Complaint   Patient presents with   • Diabetes     TOO EARLY TO DO A1C DUE TO INSURANCE   • 3 month f/u   • Bilateral carotid artery SURGERY       History of Present Illness:   Shelley Lecah is a 67 y.o. female who presents for Diabetes -- TYPE 2  WE STOPPED FARXIGA DUE INEFFECTIVENESS  SHE CANNOT TOLERATE METFORMIN  GLP1 NOT APPROPRIATE FOR PATIENT S/P GASTRIC BYPASS  SHE IS PRESCRIBED BASAL INSLIN + 1 MEAL TIME SHOT BUT OFTEN OMITS THE MEAL TIME INSULIN.     BG CHECKS ARE DONE DAILY   Hemoglobin A1C   Date Value Ref Range Status   2021 10.40 (H) 4.80 - 5.60 % Final       Changes in health since last visit: RIGHT CEA . Last eye exam DUE AND ADVISED .    Subjective      .    Review of Systems:   Review of Systems   Eyes: Positive for visual disturbance.       The following portions of the patient's history were reviewed and updated as appropriate: allergies, current medications, past family history, past medical history, past social history, past surgical history and problem list.    Objective     Visit Vitals  /68 (BP Location: Right arm, Patient Position: Sitting, Cuff Size: Adult)   Pulse 68   Ht 152.4 cm (60\")   Wt 65.8 kg (145 lb)   SpO2 100%   BMI 28.32 kg/m²       Labs:    CMP  Lab Results   Component Value Date    GLUCOSE 301 (H) 2021    BUN 26 (H) 2021    CREATININE 0.97 2021    EGFRIFNONA 58 (L) 2021    BCR 26.8 (H) 2021    K 4.5 2021    CO2 24.0 2021    CALCIUM 8.4 (L) 2021    AST 16 2020    ALT 13 2020        CBC w/DIFF  Lab Results   Component Value Date    WBC 9.83 2021    RBC 3.48 (L) 2021    HGB 10.9 (L) 2021    HCT 32.9 (L) 2021    MCV 94.5 2021    MCH 31.3 2021    MCHC 33.1 2021    RDW 11.5 (L) 2021    RDWSD 39.5 2021    MPV 11.4 2021     " 12/23/2021    NEUTRORELPCT 80.0 (H) 12/23/2021    LYMPHORELPCT 13.8 (L) 12/23/2021    MONORELPCT 5.5 12/23/2021    EOSRELPCT 0.0 (L) 12/23/2021    BASORELPCT 0.4 12/23/2021    AUTOIGPER 0.3 12/23/2021    NEUTROABS 7.86 (H) 12/23/2021    LYMPHSABS 1.36 12/23/2021    MONOSABS 0.54 12/23/2021    EOSABS 0.00 12/23/2021    BASOSABS 0.04 12/23/2021    AUTOIGNUM 0.03 12/23/2021    NRBC 0.0 12/23/2021       Physical Exam:  Physical Exam  Vitals reviewed.   Constitutional:       Appearance: Normal appearance.   Neurological:      Mental Status: She is alert.   Psychiatric:         Mood and Affect: Mood normal.         Thought Content: Thought content normal.         Judgment: Judgment normal.          Assessment / Plan      Assessment & Plan:  Problem List Items Addressed This Visit        Other    Uncontrolled type 2 diabetes mellitus with hyperglycemia (HCC) - Primary    Current Assessment & Plan      DETERIORATED.   WILL TRY TO GET HER A VGO WHICH WILL MAKE IT EASIER FOR HER TO GET HER INSULIN           Relevant Medications    insulin aspart (NovoLOG) 100 UNIT/ML injection           Sonu Talbot MD   02/15/2022

## 2022-02-17 ENCOUNTER — DOCUMENTATION (OUTPATIENT)
Dept: DIABETES SERVICES | Facility: HOSPITAL | Age: 67
End: 2022-02-17

## 2022-02-17 NOTE — PLAN OF CARE
"Pt called with question regarding VGo, she reports post-prandial hyperglycemia followed hypoglycemia. Concerns discussed with Dr. Talbot he recommended holding \"clicks\" with meals and just allowing the VGo to run basal insulin for now.  Communicated directions with pt, she is to call with further concerns. Will fup next week via telephone.  "

## 2022-02-24 ENCOUNTER — OFFICE VISIT (OUTPATIENT)
Dept: CARDIAC SURGERY | Facility: CLINIC | Age: 67
End: 2022-02-24

## 2022-02-24 DIAGNOSIS — I65.21 STENOSIS OF RIGHT CAROTID ARTERY: Primary | ICD-10-CM

## 2022-02-24 PROCEDURE — 99441 PR PHYS/QHP TELEPHONE EVALUATION 5-10 MIN: CPT | Performed by: NURSE PRACTITIONER

## 2022-02-24 NOTE — PROGRESS NOTES
Telehealth E-Visit      Patient Name: Shelley Leach  : 1955   MRN: [id number]     The patient has consented to a Telehealth Visit.     Chief Complaint:    Chief Complaint   Patient presents with   • Follow-up     Follow up to discuss Aspirin therapy. Pt states that she is feeling very well just has questions about continuing aspirin.       History of Present Illness:   Shelley Leach is a 67 y.o. female non-smoker with history of HTN, HLD on statin therapy, insulin-dependent DM (HA1c 10.4), CKD III, and symptomatic carotid disease s/p right carotid endarterectomy with pericardial patch on 2021 by Dr. Morris.  Patient had uneventful hospital course and was discharged on POD #1 with no readmissions. She was last seen in clinic 2022 postoperatively denying any interval history of TIA or CVA with no new focal neurologic dysfunction specifically vision changes or amorous fugax.  Exam shows well-healing incisions without overt complication however she was complaining of a pruritic rash with new medications of Plavix, Protonix, and losartan.  She presents today via telehealth to reevaluate medication reaction.      Subjective      Review of Systems:   Review of Systems   Constitutional: Negative for chills, decreased appetite, diaphoresis, fever, malaise/fatigue, night sweats, weight gain and weight loss.   HENT: Negative for hoarse voice.    Eyes: Negative for blurred vision, double vision and visual disturbance.   Cardiovascular: Positive for palpitations (with great exertion). Negative for chest pain, claudication, dyspnea on exertion, irregular heartbeat, leg swelling, near-syncope, orthopnea, paroxysmal nocturnal dyspnea and syncope.   Respiratory: Negative for cough, hemoptysis, shortness of breath, sputum production and wheezing.    Hematologic/Lymphatic: Negative for adenopathy and bleeding problem. Bruises/bleeds easily.   Skin: Negative for color change, nail changes, poor wound  healing and rash.   Musculoskeletal: Positive for arthritis. Negative for back pain, falls and muscle cramps.   Gastrointestinal: Negative for abdominal pain, dysphagia and heartburn.   Genitourinary: Negative for flank pain.   Neurological: Negative for brief paralysis, disturbances in coordination, dizziness, focal weakness, headaches, light-headedness, loss of balance, numbness, paresthesias, sensory change, vertigo and weakness.   Psychiatric/Behavioral: Positive for depression. Negative for suicidal ideas. The patient is nervous/anxious.    Allergic/Immunologic: Positive for environmental allergies. Negative for persistent infections.       I have reviewed the following portions of the patient's history: allergies, current medications, past family history, past medical history, past social history, past surgical history and problem list and confirm it's accurate.    Medications:     Current Outpatient Medications:   •  Alcohol Swabs (Alcohol Wipes) 70 % pads, As Needed., Disp: , Rfl:   •  aspirin (aspirin) 81 MG EC tablet, Take 81 mg by mouth Daily., Disp: , Rfl:   •  atorvastatin (LIPITOR) 10 MG tablet, Take 10 mg by mouth Every Night., Disp: , Rfl:   •  Biotin 10934 MCG tablet dispersible, Place 10,000 mcg on the tongue 2 (two) times a day., Disp: , Rfl:   •  Calcium Polycarbophil (FIBER-CAPS PO), Take 2 capsules by mouth Daily. 2 tablets daily , Disp: , Rfl:   •  carvedilol (COREG) 6.25 MG tablet, Take 6.25 mg by mouth Every 12 (Twelve) Hours., Disp: , Rfl:   •  cetirizine (ZyrTEC Allergy) 10 MG tablet, Take 10 mg by mouth Daily., Disp: , Rfl:   •  clopidogrel (PLAVIX) 75 MG tablet, Take 1 tablet by mouth Daily., Disp: 30 tablet, Rfl: 6  •  estradiol (ESTRACE) 0.1 MG/GM vaginal cream, INSERT 1G VAGINALY TWICE A WEEK AT BEDTIME, Disp: , Rfl:   •  ezetimibe (ZETIA) 10 MG tablet, Take 10 mg by mouth Daily., Disp: , Rfl:   •  famotidine (PEPCID) 40 MG tablet, , Disp: , Rfl:   •  fenofibrate (TRICOR) 145 MG  tablet, Take 145 mg by mouth Daily., Disp: , Rfl:   •  ferrous sulfate 300 (60 Fe) MG/5ML syrup, Take 300 mg by mouth Daily With Breakfast., Disp: , Rfl:   •  fluconazole (DIFLUCAN) 150 MG tablet, Take 150 mg by mouth 1 (One) Time As Needed (YEAST). As needed, Disp: , Rfl:   •  gabapentin (NEURONTIN) 100 MG capsule, Take 100 mg by mouth Daily. 1 am, Disp: , Rfl:   •  gabapentin (NEURONTIN) 300 MG capsule, Take 300 mg by mouth Every Night., Disp: , Rfl:   •  glucose blood test strip, Daily., Disp: , Rfl:   •  insulin aspart (NovoLOG) 100 UNIT/ML injection, 38 UNITS PER DAY VIA VGO, Disp: 20 mL, Rfl: 11  •  Insulin Disposable Pump (V-Go 20) kit, USE DAILY TO GIVE INSULIN, Disp: 30 each, Rfl: 11  •  losartan (COZAAR) 50 MG tablet, Take 50 mg by mouth Daily., Disp: , Rfl:   •  Mirabegron ER (Myrbetriq) 25 MG tablet sustained-release 24 hour 24 hr tablet, Take 25 mg by mouth Daily., Disp: , Rfl:   •  Multiple Vitamins-Minerals (BARIATRIC FUSION PO), Take 1 tablet by mouth Daily. Hair skin and nails , Disp: , Rfl:   •  multivitamin (THERAGRAN) tablet tablet, take 1 tablet by oral route  every day with food, Disp: , Rfl:   •  Probiotic Product (TRUBIOTICS PO), Take 1 capsule by mouth Daily., Disp: , Rfl:   •  sertraline (ZOLOFT) 50 MG tablet, 50 mg Daily., Disp: , Rfl:   •  sucralfate (CARAFATE) 1 g tablet, Take 1 g by mouth 4 (Four) Times a Day., Disp: , Rfl:   •  Synthroid 100 MCG tablet, 100 mcg Daily., Disp: , Rfl:   •  venlafaxine (EFFEXOR) 37.5 MG tablet, Take 37.5 mg by mouth 2 (Two) Times a Day., Disp: , Rfl:   •  vitamin C (ASCORBIC ACID) 250 MG tablet, Take 250 mg by mouth Daily., Disp: , Rfl:   •  vitamin D3 125 MCG (5000 UT) capsule capsule, Take 10,000 Units by mouth Daily., Disp: , Rfl:   No current facility-administered medications for this visit.    Facility-Administered Medications Ordered in Other Visits:   •  Chlorhexidine Gluconate Cloth 2 % pads 1 application, 1 application, Topical, Q12H PRN,  Sarah Wan PA-C  •  Chlorhexidine Gluconate Cloth 2 % pads 1 application, 1 application, Topical, Q12H PRN, Jeffery Sumner PA    Allergies:   Allergies   Allergen Reactions   • Percocet [Oxycodone-Acetaminophen] Itching   • Morphine Other (See Comments)     HYPOTENSION   • Sulfamethoxazole-Trimethoprim Swelling   • Penicillins Rash       Objective     Physical Exam: DELILAH via telehealth   Vital Signs: There were no vitals filed for this visit.  There is no height or weight on file to calculate BMI.    Physical Exam    Imaging/Labs:  EEG Monitoring Nonintracranial Surgery-Result Date: 12/22/2021  Uneventful EEG monitoring during right CEA This report is transcribed using the Dragon dictation system.       EEG (Pre-Op)-Result Date: 12/22/2021  The study is adequate for intraoperative monitoring This report is transcribed using the Dragon dictation system.       Carotid Cerebral Angiogram-Dr Patino Given-11/9/2021  1.  Chronic occlusion of the cervical left ICA.  There is robust collateral flow to the left cerebral hemisphere via patent anterior and posterior communicating arteries.  2.  Advanced plaque formation at the right carotid bifurcation, resulting in a high-grade (greater than 70%) stenosis utilizing NASCET criteria.     Carotid duplex ([facility])- 6/25/2021  Elevated ICA velocities. Findings based on velocity criteria are consistent with moderate stenosis, in the range of 50-69%. Left occluded ICA    Assessment / Plan      Assessment/Plan:  Diagnoses and all orders for this visit:    1. Stenosis of right carotid artery s/p RIGHT CEA 2021 (Primary)       · Non-smoker with symptomatic carotid disease s/p right carotid endarterectomy with pericardial patch on 12/22/2021 by Dr. Morris.    · Uneventful hospital course and was discharged on POD #1 with no readmissions.    · Last seen in clinic 1/17/22 post-op with no interval TIA or CVA symptoms and no new focal neurologic  dysfunction, specifically vision changes or amaurosis fugax.   · Well healing CEA incision without surrounding erythema, underling induration or fluctuation.   · Complaining of pruritic rash with new medications include Plavix, Protonix, and losartan.    · No visible rash on exam however areas of excoriation from scratching on pt's back.   · Presents today via telehealth for medication recommendations via Dr. Morris  · Patient is no longer having pruritic rash reporting it dissipated several weeks ago.  · Recommending continuing DAPT per Dr Morris  · Gastro is requesting pt be taken off ASA secondary to previous gastric bypass.  They can proceed as they see fit  · Pt will need carotid duplex in 1 year     Follow Up:   Return in about 1 year (around 2/24/2023) for Imaging next visit.  Or sooner for any further concerns or worsening sign and symptoms. If unable to reach us in the office please dial 911 or go to the nearest emergency department.    This visit has been rescheduled as a phone visit to comply with patient safety concerns in accordance with CDC recommendations. Total time of discussion was 8 minutes.     YUAN Dunbar  [facility] Cardiothoracic Surgery

## 2022-02-28 ENCOUNTER — TELEPHONE (OUTPATIENT)
Dept: ENDOCRINOLOGY | Facility: CLINIC | Age: 67
End: 2022-02-28

## 2022-02-28 ENCOUNTER — APPOINTMENT (OUTPATIENT)
Dept: WOMENS IMAGING | Facility: HOSPITAL | Age: 67
End: 2022-02-28

## 2022-02-28 PROCEDURE — 77067 SCR MAMMO BI INCL CAD: CPT | Performed by: RADIOLOGY

## 2022-02-28 NOTE — TELEPHONE ENCOUNTER
Patient called stated she needs freestFUZE Fit For A Kid! papito sent to [facility] in [place]. Please advise.

## 2022-03-02 ENCOUNTER — TELEPHONE (OUTPATIENT)
Dept: ENDOCRINOLOGY | Facility: CLINIC | Age: 67
End: 2022-03-02

## 2022-03-02 NOTE — TELEPHONE ENCOUNTER
Spoke with patient.  States her blood sugar has been dropping.  Today it was 40.  Patient wanted to know why her prescription was changed from V-Go 20 to V-Go 40.  Advised that last rx sent by office on 02/15/2022 was for the V-Go 20.      Called and spoke with pharmacy.  They state they gave patient the V-Go 40 because the prescription for the insulin states up to 38 units daily.  Advised that it states that because the V-Go 20 gives her 20 units continuously throughout the day and she can bolus up to 3 times daily.  Pharmacy states they will order and have rx in tomorrow.     Per Dr. Talbot, stop the V-Go 40.  Patient notified and verbalized understanding.  She is going to come in and  sample today of the 40.

## 2022-03-02 NOTE — TELEPHONE ENCOUNTER
PT STATED THAT HER INSURANCE DOES NOT COVER TUCKER SYSTEM. PLEASE LOOK INTO THIS    SHE STATED HER BS HAS BEEN FLUCTUATING. SHE STATED HER BS THIS MORN WAS 40. I HAD HER CHECK HER BS AND IT WAS 80 AT TIME OF CALL. PLEASE REACH OU TO PT AT EARLIEST CONVENIENCE. THANK YOU

## 2022-03-21 ENCOUNTER — OFFICE VISIT (OUTPATIENT)
Dept: ENDOCRINOLOGY | Facility: CLINIC | Age: 67
End: 2022-03-21

## 2022-03-21 VITALS
HEIGHT: 60 IN | WEIGHT: 148 LBS | HEART RATE: 71 BPM | OXYGEN SATURATION: 98 % | SYSTOLIC BLOOD PRESSURE: 136 MMHG | DIASTOLIC BLOOD PRESSURE: 72 MMHG | BODY MASS INDEX: 29.06 KG/M2

## 2022-03-21 DIAGNOSIS — E11.65 UNCONTROLLED TYPE 2 DIABETES MELLITUS WITH HYPERGLYCEMIA: ICD-10-CM

## 2022-03-21 LAB
EXPIRATION DATE: NORMAL
EXPIRATION DATE: NORMAL
GLUCOSE BLDC GLUCOMTR-MCNC: 122 MG/DL (ref 70–130)
HBA1C MFR BLD: 8.5 %
Lab: NORMAL
Lab: NORMAL

## 2022-03-21 PROCEDURE — 82947 ASSAY GLUCOSE BLOOD QUANT: CPT | Performed by: INTERNAL MEDICINE

## 2022-03-21 PROCEDURE — 3052F HG A1C>EQUAL 8.0%<EQUAL 9.0%: CPT | Performed by: INTERNAL MEDICINE

## 2022-03-21 PROCEDURE — 83036 HEMOGLOBIN GLYCOSYLATED A1C: CPT | Performed by: INTERNAL MEDICINE

## 2022-03-21 PROCEDURE — 99213 OFFICE O/P EST LOW 20 MIN: CPT | Performed by: INTERNAL MEDICINE

## 2022-03-21 NOTE — ASSESSMENT & PLAN NOTE
Improved in that now her a1c is dramatically lower in just a month using the vgol  Her 2 weeks of libredata show fasting hypoglycemia and bedtime hyperglycemia.  Based upon this 2 weeks of data, she need to use one klick at supper and eat a larger bedtime snack. I don't have a smaller vgo for her to use.  --  Lissa data were on her phone and not downloaded  I pointed out that this pattern of blood sugars using much less insulin than she was prescribed previously tells me she simply was not taking her insulin previously. She agrees.

## 2022-03-21 NOTE — PROGRESS NOTES
"     Office Note      Date: 2022  Patient Name: Shelley Leach  MRN: [id number]  : 1955    Chief Complaint   Patient presents with   • Diabetes     Type II       History of Present Illness:   Shelley Leach is a 67 y.o. female who presents for Diabetes (Type II)  we have her on the vgo and the papito.. she feels better. She has some fasting hypoglycemia and post supper hyperglycemia.  She is injecting insulin via a vgo which is a disposable insulin pump. She adjusts her insulin doses because of hyperglycemia.     Subjective          Review of Systems:   Review of Systems   Constitutional: Negative.    HENT: Negative.    Eyes: Negative.        The following portions of the patient's history were reviewed and updated as appropriate: allergies, current medications, past family history, past medical history, past social history, past surgical history and problem list.    Objective     Visit Vitals  /72 (BP Location: Left arm, Patient Position: Sitting, Cuff Size: Adult)   Pulse 71   Ht 152.4 cm (60\")   Wt 67.1 kg (148 lb)   SpO2 98%   BMI 28.90 kg/m²       Labs:    CBC w/DIFF  Lab Results   Component Value Date    WBC 9.83 2021    RBC 3.48 (L) 2021    HGB 10.9 (L) 2021    HCT 32.9 (L) 2021    MCV 94.5 2021    MCH 31.3 2021    MCHC 33.1 2021    RDW 11.5 (L) 2021    RDWSD 39.5 2021    MPV 11.4 2021     2021    NEUTRORELPCT 80.0 (H) 2021    LYMPHORELPCT 13.8 (L) 2021    MONORELPCT 5.5 2021    EOSRELPCT 0.0 (L) 2021    BASORELPCT 0.4 2021    AUTOIGPER 0.3 2021    NEUTROABS 7.86 (H) 2021    LYMPHSABS 1.36 2021    MONOSABS 0.54 2021    EOSABS 0.00 2021    BASOSABS 0.04 2021    AUTOIGNUM 0.03 2021    NRBC 0.0 2021       T4  No results found for: FREET4    TSH  No results found for: TSHBASE     Physical Exam:  Physical Exam  Vitals reviewed. "   Constitutional:       Appearance: Normal appearance.   Neurological:      Mental Status: She is alert.   Psychiatric:         Mood and Affect: Mood normal.         Thought Content: Thought content normal.         Judgment: Judgment normal.         Assessment / Plan      Assessment & Plan:  Problem List Items Addressed This Visit        Other    Uncontrolled type 2 diabetes mellitus with hyperglycemia (HCC)    Current Assessment & Plan     Improved in that now her a1c is dramatically lower in just a month using the vgol  Her 2 weeks of libredata show fasting hypoglycemia and bedtime hyperglycemia.  Based upon this 2 weeks of data, she need to use one klick at supper and eat a larger bedtime snack. I don't have a smaller vgo for her to use.  --  Lissa data were on her phone and not downloaded  I pointed out that this pattern of blood sugars using much less insulin than she was prescribed previously tells me she simply was not taking her insulin previously. She agrees.            Relevant Medications    insulin aspart (NovoLOG) 100 UNIT/ML injection    Other Relevant Orders    POC Glucose, Blood (Completed)    POC Glycosylated Hemoglobin (Hb A1C) (Completed)           Sonu Talbot MD   03/21/2022

## 2022-03-28 ENCOUNTER — TELEPHONE (OUTPATIENT)
Dept: ENDOCRINOLOGY | Facility: CLINIC | Age: 67
End: 2022-03-28

## 2022-03-28 NOTE — TELEPHONE ENCOUNTER
PT CALLED STATLISSA SHE IS OUT OF INSULINE. SHE STATED IT IS TOO SOON FOR HER TO  HER CURRENT RX. SHE REQUESTED WE SEND IN ANOTHER RX TO [facility]

## 2022-04-04 ENCOUNTER — TELEPHONE (OUTPATIENT)
Dept: ENDOCRINOLOGY | Facility: CLINIC | Age: 67
End: 2022-04-04

## 2022-04-04 NOTE — TELEPHONE ENCOUNTER
V-GO CALLED STATING THEY HAVE BEEN TRYING TO GET AHOLD OF THIS PT TO GET HER SET UP. REP WANTED TO LET US KNOW.

## 2022-04-04 NOTE — TELEPHONE ENCOUNTER
Called pt - she answered but her phone was breaking up badly - I think she said she is getting it today. Told her to call us back if we could do anything to help her with the vgo-since I couldn't understand her

## 2022-04-06 RX ORDER — CETIRIZINE HYDROCHLORIDE 10 MG/1
TABLET ORAL
Qty: 90 TABLET | Refills: 1 | Status: SHIPPED | OUTPATIENT
Start: 2022-04-06 | End: 2022-04-14 | Stop reason: SDUPTHER

## 2022-04-06 NOTE — TELEPHONE ENCOUNTER
Rx Refill Note    Requested Prescriptions     Pending Prescriptions Disp Refills   • cetirizine (zyrTEC) 10 MG tablet [Pharmacy Med Name: CETIRIZINE HCL 10MG^ TABLET 10 Tablet] 90 tablet 1     Sig: TAKE ONE TABLET BY MOUTH DAILY        Last office visit with prescribing clinician: Via [email or web address] 01/28/22      Next office visit with prescribing clinician: Visit date not found   Last labs: 12/10/21  Last refill: 01/10/22   Pharmacy (be sure to add in Epic). correct

## 2022-04-11 NOTE — TELEPHONE ENCOUNTER
PATIENT RETURNED CALL, ADVISED PATIENT THAT NOVOLOG SAMPLES ARE NOT AVAILABLE.    PATIENT THEN ASKED ABOUT SENSOR SAMPLES. ADVISED PATIENT THAT RX WAS SENT TO HER PHARMACY TODAY, PATIENT STATES THAT ORIGINAL CALL WAS TO REQUEST SAMPLES OF SENSORS, NOT RX, AS SHE STATES SENSORS COST $150 PER MONTH AND DOES NOT WANT RX AT PHARMACY DUE TO COST.     PLEASE RETURN CALL TO PATIENT TO DISCUSS.

## 2022-04-11 NOTE — TELEPHONE ENCOUNTER
Pt called requesting a prescription for Freestyle Lissa 2 Sensor also pt wanted to know if we have any samples of Novolog 100 unit/mL injection. Pt last seen 03/21/22 pt next appt 07/19/22

## 2022-04-14 ENCOUNTER — TELEPHONE (OUTPATIENT)
Dept: CARDIAC SURGERY | Facility: CLINIC | Age: 67
End: 2022-04-14

## 2022-04-14 RX ORDER — CARVEDILOL 6.25 MG/1
6.25 TABLET ORAL EVERY 12 HOURS
Qty: 180 TABLET | Refills: 1 | Status: SHIPPED | OUTPATIENT
Start: 2022-04-14 | End: 2022-09-08

## 2022-04-14 RX ORDER — LEVOTHYROXINE SODIUM 0.1 MG/1
100 TABLET ORAL DAILY
Qty: 90 TABLET | Refills: 1 | Status: SHIPPED | OUTPATIENT
Start: 2022-04-14 | End: 2022-05-20 | Stop reason: SDUPTHER

## 2022-04-14 RX ORDER — LOSARTAN POTASSIUM 50 MG/1
50 TABLET ORAL DAILY
Qty: 90 TABLET | Refills: 1 | Status: SHIPPED | OUTPATIENT
Start: 2022-04-14 | End: 2022-05-20 | Stop reason: SDUPTHER

## 2022-04-14 RX ORDER — LEVOTHYROXINE SODIUM 0.1 MG/1
100 TABLET ORAL DAILY
COMMUNITY
End: 2022-04-14 | Stop reason: SDUPTHER

## 2022-04-14 RX ORDER — CETIRIZINE HYDROCHLORIDE 10 MG/1
10 TABLET ORAL DAILY
Qty: 90 TABLET | Refills: 1 | Status: SHIPPED | OUTPATIENT
Start: 2022-04-14 | End: 2022-11-16

## 2022-04-14 RX ORDER — CLOPIDOGREL BISULFATE 75 MG/1
75 TABLET ORAL DAILY
Qty: 90 TABLET | Refills: 1 | Status: SHIPPED | OUTPATIENT
Start: 2022-04-14 | End: 2022-11-17

## 2022-04-14 NOTE — TELEPHONE ENCOUNTER
Mrs. Leach called requesting a refill on Plavix 75mg. She is requesting a 90 day supply sent to the [facility] mail order pharmacy.     Thanks  Jeffery

## 2022-04-14 NOTE — TELEPHONE ENCOUNTER
Rx Refill Note    Requested Prescriptions     Pending Prescriptions Disp Refills   • levothyroxine (SYNTHROID, LEVOTHROID) 100 MCG tablet 90 tablet 1     Sig: Take 1 tablet by mouth Daily.   • losartan (COZAAR) 50 MG tablet 90 tablet 1     Sig: Take 1 tablet by mouth Daily.   • sertraline (ZOLOFT) 50 MG tablet 90 tablet 1     Sig: Take 1 tablet by mouth Daily.   • cetirizine (zyrTEC) 10 MG tablet 90 tablet 1     Sig: Take 1 tablet by mouth Daily.   • carvedilol (COREG) 6.25 MG tablet 180 tablet 1     Sig: Take 1 tablet by mouth Every 12 (Twelve) Hours.        Last office visit with prescribing clinician: 03/17/2022     Next office visit with prescribing clinician: 5/27/2022   Last labs: 12/10/2021  Last refill:  12/28/2021  Pharmacy  [facility]

## 2022-04-20 RX ORDER — VENLAFAXINE 37.5 MG/1
37.5 TABLET ORAL 2 TIMES DAILY
Qty: 180 TABLET | Refills: 0 | Status: SHIPPED | OUTPATIENT
Start: 2022-04-20 | End: 2022-07-05

## 2022-04-20 NOTE — TELEPHONE ENCOUNTER
Rx Refill Note    Requested Prescriptions     Pending Prescriptions Disp Refills   • venlafaxine (EFFEXOR) 37.5 MG tablet 180 tablet 0     Sig: Take 1 tablet by mouth 2 (Two) Times a Day.        Last office visit with prescribing clinician: Via [email or web address] 01/28/22    Next office visit with prescribing clinician: 5/27/2022   Last labs:   Last refill: 02/09/22 180 with 5 refills per [facility]   Pharmacy (be sure to add in Epic). correct

## 2022-05-02 ENCOUNTER — EDUCATION (OUTPATIENT)
Dept: DIABETES SERVICES | Facility: HOSPITAL | Age: 67
End: 2022-05-02

## 2022-05-02 NOTE — CONSULTS
Diabetes Education    Patient Name:  Shelley Leach  YOB: 1955  MRN: [id number]  Admit Date:  (Not on file)        Diabetes education follow up phone call to pt; 30 min phone call with BETTY QUINTANILLA. Please see media tab for full assessment and note. Thank you.      Electronically signed by:  Riddhi Matos RN, BETTY  05/02/22 12:05 EDT

## 2022-05-05 ENCOUNTER — TELEPHONE (OUTPATIENT)
Dept: ENDOCRINOLOGY | Facility: CLINIC | Age: 67
End: 2022-05-05

## 2022-05-05 NOTE — TELEPHONE ENCOUNTER
[facility] DID WE RECEIVE THE FAX FROM THEM  FOR FREESTYLE TUCKER AND CHART NOTES  [phone]

## 2022-05-20 ENCOUNTER — LAB (OUTPATIENT)
Dept: FAMILY MEDICINE CLINIC | Facility: CLINIC | Age: 67
End: 2022-05-20

## 2022-05-20 DIAGNOSIS — Z11.59 ENCOUNTER FOR HCV SCREENING TEST FOR LOW RISK PATIENT: ICD-10-CM

## 2022-05-20 DIAGNOSIS — E78.5 HYPERLIPIDEMIA, UNSPECIFIED HYPERLIPIDEMIA TYPE: ICD-10-CM

## 2022-05-20 DIAGNOSIS — E11.69 TYPE 2 DIABETES MELLITUS WITH OTHER SPECIFIED COMPLICATION, UNSPECIFIED WHETHER LONG TERM INSULIN USE: Primary | ICD-10-CM

## 2022-05-20 DIAGNOSIS — E55.9 AVITAMINOSIS D: ICD-10-CM

## 2022-05-20 DIAGNOSIS — E53.8 B12 DEFICIENCY: ICD-10-CM

## 2022-05-20 PROCEDURE — 36415 COLL VENOUS BLD VENIPUNCTURE: CPT | Performed by: FAMILY MEDICINE

## 2022-05-20 RX ORDER — EZETIMIBE 10 MG/1
10 TABLET ORAL DAILY
Qty: 90 TABLET | Refills: 1 | Status: SHIPPED | OUTPATIENT
Start: 2022-05-20 | End: 2022-12-30 | Stop reason: SDUPTHER

## 2022-05-20 RX ORDER — LOSARTAN POTASSIUM 50 MG/1
50 TABLET ORAL DAILY
Qty: 90 TABLET | Refills: 1 | Status: SHIPPED | OUTPATIENT
Start: 2022-05-20

## 2022-05-20 RX ORDER — ATORVASTATIN CALCIUM 10 MG/1
10 TABLET, FILM COATED ORAL NIGHTLY
Qty: 90 TABLET | Refills: 1 | Status: SHIPPED | OUTPATIENT
Start: 2022-05-20 | End: 2022-11-17

## 2022-05-20 RX ORDER — LEVOTHYROXINE SODIUM 0.1 MG/1
100 TABLET ORAL DAILY
Qty: 90 TABLET | Refills: 1 | Status: SHIPPED | OUTPATIENT
Start: 2022-05-20 | End: 2022-07-19 | Stop reason: DRUGHIGH

## 2022-05-20 NOTE — TELEPHONE ENCOUNTER
Rx Refill Note    Requested Prescriptions     Pending Prescriptions Disp Refills   • losartan (COZAAR) 50 MG tablet 90 tablet 1     Sig: Take 1 tablet by mouth Daily.   • ezetimibe (ZETIA) 10 MG tablet 90 tablet 1     Sig: Take 1 tablet by mouth Daily.   • levothyroxine (SYNTHROID, LEVOTHROID) 100 MCG tablet 90 tablet 1     Sig: Take 1 tablet by mouth Daily.   • atorvastatin (LIPITOR) 10 MG tablet 90 tablet 1     Sig: Take 1 tablet by mouth Every Night.        Last office visit with prescribing clinician:   04/20/2022    Next office visit with prescribing clinician: 5/27/2022   Last labs: 05/20/2022  Last refill: 04/14/2022  Pharmacy [facility] mail order

## 2022-05-21 LAB
25(OH)D3+25(OH)D2 SERPL-MCNC: 48.3 NG/ML (ref 30–100)
ALBUMIN SERPL-MCNC: 3.7 G/DL (ref 3.8–4.8)
ALBUMIN/GLOB SERPL: 1.5 {RATIO} (ref 1.2–2.2)
ALP SERPL-CCNC: 121 IU/L (ref 44–121)
ALT SERPL-CCNC: 29 IU/L (ref 0–32)
AST SERPL-CCNC: 24 IU/L (ref 0–40)
BASOPHILS # BLD AUTO: 0.1 X10E3/UL (ref 0–0.2)
BASOPHILS NFR BLD AUTO: 1 %
BILIRUB SERPL-MCNC: <0.2 MG/DL (ref 0–1.2)
BUN SERPL-MCNC: 19 MG/DL (ref 8–27)
BUN/CREAT SERPL: 16 (ref 12–28)
CALCIUM SERPL-MCNC: 9.1 MG/DL (ref 8.7–10.3)
CHLORIDE SERPL-SCNC: 103 MMOL/L (ref 96–106)
CHOLEST SERPL-MCNC: 180 MG/DL (ref 100–199)
CK SERPL-CCNC: 54 U/L (ref 32–182)
CO2 SERPL-SCNC: 26 MMOL/L (ref 20–29)
CREAT SERPL-MCNC: 1.16 MG/DL (ref 0.57–1)
EGFRCR SERPLBLD CKD-EPI 2021: 52 ML/MIN/1.73
EOSINOPHIL # BLD AUTO: 0.3 X10E3/UL (ref 0–0.4)
EOSINOPHIL NFR BLD AUTO: 4 %
ERYTHROCYTE [DISTWIDTH] IN BLOOD BY AUTOMATED COUNT: 13.4 % (ref 11.7–15.4)
GLOBULIN SER CALC-MCNC: 2.5 G/DL (ref 1.5–4.5)
GLUCOSE SERPL-MCNC: 145 MG/DL (ref 65–99)
HBA1C MFR BLD: 10.2 % (ref 4.8–5.6)
HCT VFR BLD AUTO: 35.5 % (ref 34–46.6)
HCV AB S/CO SERPL IA: 0.1 S/CO RATIO (ref 0–0.9)
HDLC SERPL-MCNC: 41 MG/DL
HGB BLD-MCNC: 11.5 G/DL (ref 11.1–15.9)
IMM GRANULOCYTES # BLD AUTO: 0 X10E3/UL (ref 0–0.1)
IMM GRANULOCYTES NFR BLD AUTO: 0 %
LDLC SERPL CALC-MCNC: 105 MG/DL (ref 0–99)
LYMPHOCYTES # BLD AUTO: 3.1 X10E3/UL (ref 0.7–3.1)
LYMPHOCYTES NFR BLD AUTO: 49 %
MCH RBC QN AUTO: 30.7 PG (ref 26.6–33)
MCHC RBC AUTO-ENTMCNC: 32.4 G/DL (ref 31.5–35.7)
MCV RBC AUTO: 95 FL (ref 79–97)
MONOCYTES # BLD AUTO: 0.5 X10E3/UL (ref 0.1–0.9)
MONOCYTES NFR BLD AUTO: 8 %
NEUTROPHILS # BLD AUTO: 2.4 X10E3/UL (ref 1.4–7)
NEUTROPHILS NFR BLD AUTO: 38 %
PLATELET # BLD AUTO: 231 X10E3/UL (ref 150–450)
POTASSIUM SERPL-SCNC: 4.9 MMOL/L (ref 3.5–5.2)
PROT SERPL-MCNC: 6.2 G/DL (ref 6–8.5)
RBC # BLD AUTO: 3.74 X10E6/UL (ref 3.77–5.28)
SODIUM SERPL-SCNC: 142 MMOL/L (ref 134–144)
TRIGL SERPL-MCNC: 198 MG/DL (ref 0–149)
TSH SERPL DL<=0.005 MIU/L-ACNC: 7.1 UIU/ML (ref 0.45–4.5)
VIT B12 SERPL-MCNC: 1691 PG/ML (ref 232–1245)
VLDLC SERPL CALC-MCNC: 34 MG/DL (ref 5–40)
WBC # BLD AUTO: 6.3 X10E3/UL (ref 3.4–10.8)

## 2022-05-27 ENCOUNTER — OFFICE VISIT (OUTPATIENT)
Dept: FAMILY MEDICINE CLINIC | Facility: CLINIC | Age: 67
End: 2022-05-27

## 2022-05-27 VITALS
WEIGHT: 149.4 LBS | TEMPERATURE: 98 F | DIASTOLIC BLOOD PRESSURE: 80 MMHG | SYSTOLIC BLOOD PRESSURE: 110 MMHG | RESPIRATION RATE: 12 BRPM | BODY MASS INDEX: 30.12 KG/M2 | HEART RATE: 78 BPM | HEIGHT: 59 IN | OXYGEN SATURATION: 98 %

## 2022-05-27 DIAGNOSIS — E03.9 ACQUIRED HYPOTHYROIDISM: ICD-10-CM

## 2022-05-27 DIAGNOSIS — E55.9 VITAMIN D DEFICIENCY: ICD-10-CM

## 2022-05-27 DIAGNOSIS — G62.9 NEUROPATHY: ICD-10-CM

## 2022-05-27 DIAGNOSIS — N18.32 CHRONIC KIDNEY DISEASE, STAGE 3B: ICD-10-CM

## 2022-05-27 DIAGNOSIS — E78.2 HYPERLIPIDEMIA, MIXED: ICD-10-CM

## 2022-05-27 DIAGNOSIS — E53.8 VITAMIN B12 DEFICIENCY: ICD-10-CM

## 2022-05-27 DIAGNOSIS — Z79.899 ENCOUNTER FOR LONG-TERM (CURRENT) USE OF OTHER MEDICATIONS: ICD-10-CM

## 2022-05-27 DIAGNOSIS — E11.65 TYPE 2 DIABETES MELLITUS WITH HYPERGLYCEMIA, WITH LONG-TERM CURRENT USE OF INSULIN: ICD-10-CM

## 2022-05-27 DIAGNOSIS — Z79.4 TYPE 2 DIABETES MELLITUS WITH HYPERGLYCEMIA, WITH LONG-TERM CURRENT USE OF INSULIN: ICD-10-CM

## 2022-05-27 DIAGNOSIS — I10 HYPERTENSION, ESSENTIAL: Primary | ICD-10-CM

## 2022-05-27 LAB
POC AMPHETAMINES: NEGATIVE
POC BARBITURATES: NEGATIVE
POC BENZODIAZEPHINES: NEGATIVE
POC COCAINE: NEGATIVE
POC CREATININE URINE: NORMAL
POC METHADONE: NEGATIVE
POC METHAMPHETAMINE SCREEN URINE: NEGATIVE
POC MICROALBUMIN URINE: NORMAL
POC OPIATES: NEGATIVE
POC OXYCODONE: NEGATIVE
POC PHENCYCLIDINE: NEGATIVE
POC PROPOXYPHENE: NEGATIVE
POC THC: NEGATIVE
POC TRICYCLIC ANTIDEPRESSANTS: NEGATIVE

## 2022-05-27 PROCEDURE — 99214 OFFICE O/P EST MOD 30 MIN: CPT | Performed by: FAMILY MEDICINE

## 2022-05-27 PROCEDURE — 80305 DRUG TEST PRSMV DIR OPT OBS: CPT | Performed by: FAMILY MEDICINE

## 2022-05-27 PROCEDURE — 82044 UR ALBUMIN SEMIQUANTITATIVE: CPT | Performed by: FAMILY MEDICINE

## 2022-05-27 RX ORDER — GABAPENTIN 300 MG/1
300 CAPSULE ORAL 3 TIMES DAILY
Qty: 270 CAPSULE | Refills: 0 | Status: SHIPPED | OUTPATIENT
Start: 2022-05-27 | End: 2022-08-29

## 2022-05-27 NOTE — PROGRESS NOTES
Patient Name: Shelley Leach  : 1955   MRN: [id number]     Chief Complaint:    Chief Complaint   Patient presents with   • LAB  RESULTS     PT HERE FOR LAB RESUTLS   • Diabetes   • Hypertension   • Hyperlipidemia       History of Present Illness: Shelley Leach is a 67 y.o. female who is here today for follow up on thyroid  HPI        Review of Systems:   Review of Systems   Constitutional: Negative.    HENT: Negative.    Eyes: Negative.    Respiratory: Negative.    Cardiovascular: Negative.    Gastrointestinal: Negative.    Neurological: Negative.         Past Medical History:   Past Medical History:   Diagnosis Date   • Bilateral carotid artery stenosis    • Clostridioides difficile infection     15 YEARS AGO, TREATED   • Dietary counseling     diet education-Abstracted from [facility]   • Dietary counseling and surveillance    • Diverticulosis    • Elevated cholesterol    • GERD (gastroesophageal reflux disease)    • History of thyroid disorder     Thyroid problem-Abstracted from [facility]   • History of transfusion     NO REACTION   • Hypertension    • Hypothyroidism    • Leaky heart valve    • PONV (postoperative nausea and vomiting)    • Type 2 diabetes mellitus, uncontrolled        Past Surgical History:   Past Surgical History:   Procedure Laterality Date   • BLEPHAROPLASTY Bilateral    • CARDIAC CATHETERIZATION      NO INTERVENTION-20 YRS AGO   • CAROTID ARTERY ANGIOPLASTY N/A    • CAROTID ENDARTERECTOMY Right 2021    Procedure: CAROTID ENDARTERECTOMY WITH EEG RIGHT;  Surgeon: Javed Morris MD;  Location: [facility] OR;  Service: Vascular;  Laterality: Right;   • CARPAL TUNNEL RELEASE Bilateral    • CHOLECYSTECTOMY     • COLONOSCOPY     • GASTRIC BYPASS     • HYSTERECTOMY     • INTERVENTIONAL RADIOLOGY PROCEDURE Bilateral 2021    Procedure: Carotid Cerebral Angiogram;  Surgeon: Sukumar Francis MD;  Location:  [facility];  Service: Interventional Radiology;   Laterality: Bilateral;   • OVARIAN CYST SURGERY      x 2   • ROTATOR CUFF REPAIR Left    • THYROIDECTOMY, PARTIAL     • TONSILLECTOMY         Family History:   Family History   Problem Relation Age of Onset   • Diabetes Mother    • Diabetes Father    • Hypertension Father    • Thyroid disease Father    • Kidney disease Father        Social History:   Social History     Socioeconomic History   • Marital status:    • Number of children: 2   Tobacco Use   • Smoking status: Never Smoker   • Smokeless tobacco: Never Used   Vaping Use   • Vaping Use: Never used   Substance and Sexual Activity   • Alcohol use: Never   • Drug use: Never   • Sexual activity: Defer       Medications:     Current Outpatient Medications:   •  Alcohol Swabs (Alcohol Wipes) 70 % pads, As Needed., Disp: , Rfl:   •  aspirin 81 MG EC tablet, Take 81 mg by mouth Daily., Disp: , Rfl:   •  atorvastatin (LIPITOR) 10 MG tablet, Take 1 tablet by mouth Every Night., Disp: 90 tablet, Rfl: 1  •  Biotin 60015 MCG tablet dispersible, Place 10,000 mcg on the tongue 2 (two) times a day., Disp: , Rfl:   •  carvedilol (COREG) 6.25 MG tablet, Take 1 tablet by mouth Every 12 (Twelve) Hours., Disp: 180 tablet, Rfl: 1  •  cetirizine (zyrTEC) 10 MG tablet, Take 1 tablet by mouth Daily., Disp: 90 tablet, Rfl: 1  •  clopidogrel (PLAVIX) 75 MG tablet, Take 1 tablet by mouth Daily., Disp: 90 tablet, Rfl: 1  •  Continuous Blood Gluc  (FreeStyle Lissa 2 [place]) device, 1 Device See Admin Instructions., Disp: 1 each, Rfl: 0  •  Continuous Blood Gluc Sensor (FreeStyle Lissa 2 Sensor) misc, 1 Device Every 14 (Fourteen) Days., Disp: 2 each, Rfl: 11  •  estradiol (ESTRACE) 0.1 MG/GM vaginal cream, INSERT 1G VAGINALY TWICE A WEEK AT BEDTIME, Disp: , Rfl:   •  ezetimibe (ZETIA) 10 MG tablet, Take 1 tablet by mouth Daily., Disp: 90 tablet, Rfl: 1  •  famotidine (PEPCID) 40 MG tablet, , Disp: , Rfl:   •  fenofibrate (TRICOR) 145 MG tablet, Take 145 mg by mouth Daily.,  Disp: , Rfl:   •  ferrous sulfate 300 (60 Fe) MG/5ML syrup, Take 300 mg by mouth Daily With Breakfast., Disp: , Rfl:   •  fluconazole (DIFLUCAN) 150 MG tablet, Take 150 mg by mouth 1 (One) Time As Needed (YEAST). As needed, Disp: , Rfl:   •  gabapentin (NEURONTIN) 300 MG capsule, Take 1 capsule by mouth 3 (Three) Times a Day., Disp: 270 capsule, Rfl: 0  •  glucose blood test strip, Daily., Disp: , Rfl:   •  insulin aspart (NovoLOG) 100 UNIT/ML injection, 38 UNITS PER DAY VIA VGO, Disp: 20 mL, Rfl: 11  •  Insulin Disposable Pump (V-Go 20) kit, USE DAILY TO GIVE INSULIN, Disp: 30 each, Rfl: 11  •  levothyroxine (SYNTHROID, LEVOTHROID) 100 MCG tablet, Take 1 tablet by mouth Daily., Disp: 90 tablet, Rfl: 1  •  losartan (COZAAR) 50 MG tablet, Take 1 tablet by mouth Daily., Disp: 90 tablet, Rfl: 1  •  Mirabegron ER (MYRBETRIQ) 25 MG tablet sustained-release 24 hour 24 hr tablet, Take 25 mg by mouth Daily., Disp: , Rfl:   •  Multiple Vitamins-Minerals (BARIATRIC FUSION PO), Take 1 tablet by mouth Daily. Hair skin and nails, Disp: , Rfl:   •  multivitamin (THERAGRAN) tablet tablet, take 1 tablet by oral route  every day with food, Disp: , Rfl:   •  Probiotic Product (TRUBIOTICS PO), Take 1 capsule by mouth Daily., Disp: , Rfl:   •  sertraline (ZOLOFT) 50 MG tablet, Take 1 tablet by mouth Daily., Disp: 90 tablet, Rfl: 1  •  sucralfate (CARAFATE) 1 g tablet, Take 1 g by mouth 4 (Four) Times a Day., Disp: , Rfl:   •  venlafaxine (EFFEXOR) 37.5 MG tablet, Take 1 tablet by mouth 2 (Two) Times a Day., Disp: 180 tablet, Rfl: 0  •  vitamin C (ASCORBIC ACID) 250 MG tablet, Take 250 mg by mouth Daily., Disp: , Rfl:   •  vitamin D3 125 MCG (5000 UT) capsule capsule, Take 10,000 Units by mouth Daily., Disp: , Rfl:   •  Evolocumab (REPATHA) solution prefilled syringe injection, Inject 1 mL under the skin into the appropriate area as directed Every 14 (Fourteen) Days., Disp: 1 mL, Rfl: 6  No current facility-administered medications for  "this visit.    Facility-Administered Medications Ordered in Other Visits:   •  Chlorhexidine Gluconate Cloth 2 % pads 1 application, 1 application, Topical, Q12H PRN, Sarah Wan PA-C  •  Chlorhexidine Gluconate Cloth 2 % pads 1 application, 1 application, Topical, Q12H PRN, Jeffery Sumner PA    Allergies:   Allergies   Allergen Reactions   • Percocet [Oxycodone-Acetaminophen] Itching   • Morphine Other (See Comments)     HYPOTENSION   • Sulfamethoxazole-Trimethoprim Swelling   • Penicillins Rash         Physical Exam:  Vital Signs:   Vitals:    05/27/22 1022   BP: 110/80   BP Location: Left arm   Patient Position: Sitting   Cuff Size: Adult   Pulse: 78   Resp: 12   Temp: 98 °F (36.7 °C)   SpO2: 98%   Weight: 67.8 kg (149 lb 6.4 oz)   Height: 149.9 cm (59\")   PainSc: 0-No pain     Body mass index is 30.18 kg/m².     Physical Exam  Vitals and nursing note reviewed.   Constitutional:       Appearance: Normal appearance. She is normal weight.   HENT:      Head: Normocephalic and atraumatic.      Right Ear: Tympanic membrane, ear canal and external ear normal.      Left Ear: Tympanic membrane, ear canal and external ear normal.      Nose: Nose normal.      Mouth/Throat:      Mouth: Mucous membranes are dry.      Pharynx: Oropharynx is clear.   Eyes:      Extraocular Movements: Extraocular movements intact.      Conjunctiva/sclera: Conjunctivae normal.      Pupils: Pupils are equal, round, and reactive to light.   Cardiovascular:      Rate and Rhythm: Normal rate and regular rhythm.      Pulses: Normal pulses.      Heart sounds: Normal heart sounds.   Pulmonary:      Effort: Pulmonary effort is normal.      Breath sounds: Normal breath sounds.   Musculoskeletal:      Cervical back: Normal range of motion and neck supple.   Feet:      Comments:      Neurological:      Mental Status: She is alert.         Procedures      Assessment/Plan:   Diagnoses and all orders for this visit:    1. Hypertension, essential " (Primary)  Assessment & Plan:  Discussed with patient to monitor their blood pressure and if systolic blood pressure goes above 140 or diastolic is above 90 to return to clinic.  Take medicines as directed, call for any problems, patient not having or any worrisome symptoms.        Orders:  -     Hemoglobin A1c; Future  -     Lipid Panel; Future  -     Comprehensive Metabolic Panel; Future  -     Vitamin B12; Future  -     Vitamin D 25 Hydroxy; Future  -     CBC & Differential; Future    2. Hyperlipidemia, mixed  Assessment & Plan:  LDL is 105.  Patient is on atorvastatin 10 and Zetia 10.  She cannot tolerate increasing of atorvastatin as she gets a statin myopathy.  I Phuong start her on Repatha as she has had a carotid endarterectomy and I think is important to get her LDL lower than it is right now.    Orders:  -     Hemoglobin A1c; Future  -     Lipid Panel; Future  -     Comprehensive Metabolic Panel; Future  -     Vitamin B12; Future  -     Vitamin D 25 Hydroxy; Future  -     CBC & Differential; Future    3. Acquired hypothyroidism  Assessment & Plan:  TSH is little bit elevated.  She sees Dr. Talbot in July.  We will let him evaluate.    Orders:  -     Hemoglobin A1c; Future  -     Lipid Panel; Future  -     Comprehensive Metabolic Panel; Future  -     Vitamin B12; Future  -     Vitamin D 25 Hydroxy; Future  -     CBC & Differential; Future    4. Type 2 diabetes mellitus with hyperglycemia, with long-term current use of insulin (HCC)  Assessment & Plan:  A1c is 10.5.  Patient sees Dr. Talbot.  I told her to get him to address this as she is not controlled.    Orders:  -     POC Microalbumin  -     Hemoglobin A1c; Future  -     Lipid Panel; Future  -     Comprehensive Metabolic Panel; Future  -     Vitamin B12; Future  -     Vitamin D 25 Hydroxy; Future  -     CBC & Differential; Future    5. Vitamin B12 deficiency  Assessment & Plan:  Recheck in 3 months.    Orders:  -     Hemoglobin A1c; Future  -     Lipid  Panel; Future  -     Comprehensive Metabolic Panel; Future  -     Vitamin B12; Future  -     Vitamin D 25 Hydroxy; Future  -     CBC & Differential; Future    6. Vitamin D deficiency  Assessment & Plan:  Blood work is good    Orders:  -     Hemoglobin A1c; Future  -     Lipid Panel; Future  -     Comprehensive Metabolic Panel; Future  -     Vitamin B12; Future  -     Vitamin D 25 Hydroxy; Future  -     CBC & Differential; Future    7. Chronic kidney disease, stage 3b (HCC)  Assessment & Plan:  GFR is 52.  Patient is instructed to not take any NSAIDs.  Medicines as directed.  Stay well-hydrated.      Orders:  -     Hemoglobin A1c; Future  -     Lipid Panel; Future  -     Comprehensive Metabolic Panel; Future  -     Vitamin B12; Future  -     Vitamin D 25 Hydroxy; Future  -     CBC & Differential; Future    8. Encounter for long-term (current) use of other medications  -     POC Urine Drug Screen, Triage  -     Hemoglobin A1c; Future  -     Lipid Panel; Future  -     Comprehensive Metabolic Panel; Future  -     Vitamin B12; Future  -     Vitamin D 25 Hydroxy; Future  -     CBC & Differential; Future    9. Neuropathy  -     gabapentin (NEURONTIN) 300 MG capsule; Take 1 capsule by mouth 3 (Three) Times a Day.  Dispense: 270 capsule; Refill: 0  -     Hemoglobin A1c; Future  -     Lipid Panel; Future  -     Comprehensive Metabolic Panel; Future  -     Vitamin B12; Future  -     Vitamin D 25 Hydroxy; Future  -     CBC & Differential; Future    Other orders  -     Evolocumab (REPATHA) solution prefilled syringe injection; Inject 1 mL under the skin into the appropriate area as directed Every 14 (Fourteen) Days.  Dispense: 1 mL; Refill: 6           Follow Up:   Return in about 3 months (around 8/27/2022) for Bloodwork 1 week prior to next appointment.    Tom Salas MD  [facility]

## 2022-05-27 NOTE — ASSESSMENT & PLAN NOTE
A1c is 10.5.  Patient sees Dr. Talbot.  I told her to get him to address this as she is not controlled.

## 2022-05-27 NOTE — ASSESSMENT & PLAN NOTE
LDL is 105.  Patient is on atorvastatin 10 and Zetia 10.  She cannot tolerate increasing of atorvastatin as she gets a statin myopathy.  I Phuong start her on Repatha as she has had a carotid endarterectomy and I think is important to get her LDL lower than it is right now.

## 2022-06-02 ENCOUNTER — TELEPHONE (OUTPATIENT)
Dept: ENDOCRINOLOGY | Facility: CLINIC | Age: 67
End: 2022-06-02

## 2022-06-02 ENCOUNTER — TELEPHONE (OUTPATIENT)
Dept: FAMILY MEDICINE CLINIC | Facility: CLINIC | Age: 67
End: 2022-06-02

## 2022-06-02 NOTE — TELEPHONE ENCOUNTER
Lets try no clicks at supper. Eat a bedtime snack. If that does not work then maybe the vgo is not for her- if the vgo 20 gives her too much insulin, it's the smallest one.

## 2022-06-02 NOTE — TELEPHONE ENCOUNTER
"Spoke to pt-she states she is getting low bs's at 3-5am.  She is using the v-go 20.  She states \"she pushes the button 1 time before supper\"  Please advise  "

## 2022-06-02 NOTE — TELEPHONE ENCOUNTER
Spoke to pt-she voiced understanding.  Also she states ads said they didn't get last 2 office visit - fax again.

## 2022-06-02 NOTE — TELEPHONE ENCOUNTER
PT CALLED STATING HER BS HAS BEEN DROPPING TO 40 FOR THE PAST COUPLE OF WEEKS. SHE REQUESTED A CALL BACK TO CONSULT.

## 2022-07-05 RX ORDER — VENLAFAXINE 37.5 MG/1
TABLET ORAL
Qty: 180 TABLET | Refills: 0 | Status: SHIPPED | OUTPATIENT
Start: 2022-07-05 | End: 2022-09-08

## 2022-07-05 NOTE — TELEPHONE ENCOUNTER
Rx Refill Note    Requested Prescriptions     Pending Prescriptions Disp Refills   • venlafaxine (EFFEXOR) 37.5 MG tablet [Pharmacy Med Name: VENLAFAXINE HCL 37.5 MG Tablet] 180 tablet 0     Sig: TAKE 1 TABLET TWICE DAILY        Last office visit with prescribing clinician: 5/27/2022      Next office visit with prescribing clinician: 9/6/2022   Last labs:   Last refill: 04/20/22   Pharmacy (be sure to add in Epic). correct

## 2022-07-19 ENCOUNTER — OFFICE VISIT (OUTPATIENT)
Dept: ENDOCRINOLOGY | Facility: CLINIC | Age: 67
End: 2022-07-19

## 2022-07-19 VITALS
WEIGHT: 153 LBS | HEIGHT: 62 IN | BODY MASS INDEX: 28.16 KG/M2 | OXYGEN SATURATION: 97 % | SYSTOLIC BLOOD PRESSURE: 118 MMHG | HEART RATE: 66 BPM | DIASTOLIC BLOOD PRESSURE: 75 MMHG

## 2022-07-19 DIAGNOSIS — Z79.4 TYPE 2 DIABETES MELLITUS WITH HYPERGLYCEMIA, WITH LONG-TERM CURRENT USE OF INSULIN: ICD-10-CM

## 2022-07-19 DIAGNOSIS — E11.65 UNCONTROLLED TYPE 2 DIABETES MELLITUS WITH HYPERGLYCEMIA: Primary | ICD-10-CM

## 2022-07-19 DIAGNOSIS — E11.65 TYPE 2 DIABETES MELLITUS WITH HYPERGLYCEMIA, WITH LONG-TERM CURRENT USE OF INSULIN: ICD-10-CM

## 2022-07-19 DIAGNOSIS — E03.9 ACQUIRED HYPOTHYROIDISM: ICD-10-CM

## 2022-07-19 LAB
EXPIRATION DATE: ABNORMAL
EXPIRATION DATE: NORMAL
GLUCOSE BLDC GLUCOMTR-MCNC: 175 MG/DL (ref 70–130)
HBA1C MFR BLD: 8.9 %
Lab: ABNORMAL
Lab: NORMAL

## 2022-07-19 PROCEDURE — 99214 OFFICE O/P EST MOD 30 MIN: CPT | Performed by: INTERNAL MEDICINE

## 2022-07-19 PROCEDURE — 3052F HG A1C>EQUAL 8.0%<EQUAL 9.0%: CPT | Performed by: INTERNAL MEDICINE

## 2022-07-19 PROCEDURE — 82947 ASSAY GLUCOSE BLOOD QUANT: CPT | Performed by: INTERNAL MEDICINE

## 2022-07-19 PROCEDURE — 83036 HEMOGLOBIN GLYCOSYLATED A1C: CPT | Performed by: INTERNAL MEDICINE

## 2022-07-19 RX ORDER — LEVOTHYROXINE SODIUM 112 UG/1
112 TABLET ORAL DAILY
Qty: 90 TABLET | Refills: 3 | Status: SHIPPED | OUTPATIENT
Start: 2022-07-19 | End: 2023-01-20

## 2022-07-19 NOTE — PROGRESS NOTES
"     Office Note      Date: 2022  Patient Name: Shelley Leach  MRN: [id number]  : 1955    Chief Complaint   Patient presents with   • Diabetes       History of Present Illness:   Shelley Leach is a 67 y.o. female who presents for Diabetes - type 2  On insulin in a vgo- a disposable insulin pump  bg checks are done >10 times per day with papito  Data are on her phone. bg are low in the morning and high at night  She has not been eating right- lots of stress       Last A1c:  Hemoglobin A1C   Date Value Ref Range Status   2022 8.9 % Final   2021 10.40 (H) 4.80 - 5.60 % Final       Changes in health since last visit: see above . Last eye exam up to date.    Subjective          Review of Systems:   Review of Systems   Constitutional: Negative.    HENT: Negative.    Eyes: Negative.    Respiratory: Negative.        The following portions of the patient's history were reviewed and updated as appropriate: allergies, current medications, past family history, past medical history, past social history, past surgical history and problem list.    Objective     Visit Vitals  /75   Pulse 66   Ht 157.5 cm (62\")   Wt 69.4 kg (153 lb)   SpO2 97%   BMI 27.98 kg/m²       Labs:    CMP  Lab Results   Component Value Date    GLUCOSE 145 (H) 2022    BUN 19 2022    CREATININE 1.16 (H) 2022    EGFRIFNONA 58 (L) 2021    BCR 16 2022    K 4.9 2022    CO2 26 2022    CALCIUM 9.1 2022    PROTENTOTREF 6.2 2022    LABIL2 1.5 2022    AST 24 2022    ALT 29 2022        CBC w/DIFF  Lab Results   Component Value Date    WBC 6.3 2022    RBC 3.74 (L) 2022    HGB 11.5 2022    HCT 35.5 2022    MCV 95 2022    MCH 30.7 2022    MCHC 32.4 2022    RDW 13.4 2022    RDWSD 39.5 2021    MPV 11.4 2021     2022    NEUTRORELPCT 38 2022    LYMPHORELPCT 49 2022    MONORELPCT 8 " 05/20/2022    EOSRELPCT 4 05/20/2022    BASORELPCT 1 05/20/2022    AUTOIGPER 0.3 12/23/2021    NEUTROABS 2.4 05/20/2022    LYMPHSABS 3.1 05/20/2022    MONOSABS 0.5 05/20/2022    EOSABS 0.3 05/20/2022    BASOSABS 0.1 05/20/2022    AUTOIGNUM 0.03 12/23/2021    NRBC 0.0 12/23/2021       Physical Exam:  Physical Exam  Vitals reviewed.   Constitutional:       Appearance: Normal appearance. She is normal weight.   Cardiovascular:      Pulses:           Dorsalis pedis pulses are 2+ on the right side and 2+ on the left side.        Posterior tibial pulses are 2+ on the right side and 2+ on the left side.   Musculoskeletal:      Right foot: Normal range of motion. No deformity, bunion, Charcot foot, foot drop or prominent metatarsal heads.      Left foot: Normal range of motion. No deformity, bunion, Charcot foot, foot drop or prominent metatarsal heads.   Feet:      Right foot:      Protective Sensation: 5 sites tested. 5 sites sensed.      Skin integrity: Skin integrity normal.      Toenail Condition: Right toenails are normal.      Left foot:      Protective Sensation: 5 sites tested. 5 sites sensed.      Skin integrity: Skin integrity normal.      Toenail Condition: Left toenails are normal.      Comments: Diabetic Foot Exam Performed and Monofilament Test Performed    Neurological:      Mental Status: She is alert.          Assessment / Plan      Assessment & Plan:  Problem List Items Addressed This Visit        Other    Acquired hypothyroidism    Current Assessment & Plan     Needs higher dose.            Relevant Medications    carvedilol (COREG) 6.25 MG tablet    levothyroxine (Synthroid) 112 MCG tablet    Type 2 diabetes mellitus with hyperglycemia, with long-term current use of insulin (HCC)    Current Assessment & Plan     Diabetes is improving with treatment.   Continue current treatment regimen.  Diabetes will be reassessed in 6 months     a1c down to 8.9    She needs to take insulin through the vgo at lunch and  eat a snack at bedtime. We can look at  A omnipod which can more adjustable.   .           Relevant Medications    insulin aspart (NovoLOG) 100 UNIT/ML injection      Other Visit Diagnoses     Uncontrolled type 2 diabetes mellitus with hyperglycemia (HCC)    -  Primary    Relevant Orders    POC Glucose, Blood (Completed)    POC Glycosylated Hemoglobin (Hb A1C) (Completed)           Sonu Talbot MD   07/19/2022

## 2022-07-19 NOTE — ASSESSMENT & PLAN NOTE
Diabetes is improving with treatment.   Continue current treatment regimen.  Diabetes will be reassessed in 6 months     a1c down to 8.9    She needs to take insulin through the vgo at lunch and eat a snack at bedtime. We can look at  A omnipod which can more adjustable.   .

## 2022-08-08 RX ORDER — MIRABEGRON 25 MG/1
TABLET, FILM COATED, EXTENDED RELEASE ORAL
Qty: 90 TABLET | Refills: 0 | Status: SHIPPED | OUTPATIENT
Start: 2022-08-08

## 2022-08-08 NOTE — TELEPHONE ENCOUNTER
Rx Refill Note    Requested Prescriptions     Pending Prescriptions Disp Refills   • Myrbetriq 25 MG tablet sustained-release 24 hour 24 hr tablet [Pharmacy Med Name: MYRBETRIQ 25 MG Tablet Extended Release 24 Hour] 90 tablet 0     Sig: TAKE 1 TABLET DAILY,       SWALLOWING WHOLE WITH      WATER. DO NOT CRUSH, CHEW, AND/OR DIVIDE        Last office visit with prescribing clinician: 5/27/2022      Next office visit with prescribing clinician: 9/6/2022   Last labs:   Last refill: 03/17/2022 for 90 with 1 refill   Pharmacy (be sure to add in Epic). correct

## 2022-08-15 RX ORDER — EVOLOCUMAB 140 MG/ML
INJECTION, SOLUTION SUBCUTANEOUS
Qty: 6 ML | OUTPATIENT
Start: 2022-08-15

## 2022-08-29 DIAGNOSIS — G62.9 NEUROPATHY: ICD-10-CM

## 2022-08-29 RX ORDER — GABAPENTIN 300 MG/1
300 CAPSULE ORAL 3 TIMES DAILY
Qty: 90 CAPSULE | Refills: 0 | Status: SHIPPED | OUTPATIENT
Start: 2022-08-29 | End: 2022-12-27

## 2022-09-08 RX ORDER — EVOLOCUMAB 140 MG/ML
INJECTION, SOLUTION SUBCUTANEOUS
Qty: 6 ML | Refills: 5 | Status: SHIPPED | OUTPATIENT
Start: 2022-09-08 | End: 2022-12-30 | Stop reason: SDUPTHER

## 2022-09-08 RX ORDER — CLOPIDOGREL BISULFATE 75 MG/1
TABLET ORAL
Qty: 90 TABLET | Refills: 1 | OUTPATIENT
Start: 2022-09-08

## 2022-09-08 RX ORDER — CARVEDILOL 6.25 MG/1
TABLET ORAL
Qty: 180 TABLET | Refills: 1 | Status: SHIPPED | OUTPATIENT
Start: 2022-09-08 | End: 2023-03-24 | Stop reason: SDUPTHER

## 2022-09-09 ENCOUNTER — OFFICE VISIT (OUTPATIENT)
Dept: FAMILY MEDICINE CLINIC | Facility: CLINIC | Age: 67
End: 2022-09-09

## 2022-09-09 VITALS
SYSTOLIC BLOOD PRESSURE: 122 MMHG | BODY MASS INDEX: 27.68 KG/M2 | TEMPERATURE: 97.8 F | HEART RATE: 66 BPM | DIASTOLIC BLOOD PRESSURE: 80 MMHG | OXYGEN SATURATION: 96 % | HEIGHT: 63 IN | WEIGHT: 156.2 LBS | RESPIRATION RATE: 12 BRPM

## 2022-09-09 DIAGNOSIS — Z79.4 TYPE 2 DIABETES MELLITUS WITH HYPERGLYCEMIA, WITH LONG-TERM CURRENT USE OF INSULIN: ICD-10-CM

## 2022-09-09 DIAGNOSIS — I10 HYPERTENSION, ESSENTIAL: ICD-10-CM

## 2022-09-09 DIAGNOSIS — E55.9 VITAMIN D DEFICIENCY: ICD-10-CM

## 2022-09-09 DIAGNOSIS — Z78.0 POSTMENOPAUSAL: ICD-10-CM

## 2022-09-09 DIAGNOSIS — E78.2 HYPERLIPIDEMIA, MIXED: ICD-10-CM

## 2022-09-09 DIAGNOSIS — E53.8 VITAMIN B12 DEFICIENCY: Primary | ICD-10-CM

## 2022-09-09 DIAGNOSIS — E11.65 TYPE 2 DIABETES MELLITUS WITH HYPERGLYCEMIA, WITH LONG-TERM CURRENT USE OF INSULIN: ICD-10-CM

## 2022-09-09 DIAGNOSIS — N18.32 CHRONIC KIDNEY DISEASE, STAGE 3B: ICD-10-CM

## 2022-09-09 DIAGNOSIS — R15.9 INCONTINENCE OF FECES, UNSPECIFIED FECAL INCONTINENCE TYPE: ICD-10-CM

## 2022-09-09 DIAGNOSIS — E03.9 ACQUIRED HYPOTHYROIDISM: ICD-10-CM

## 2022-09-09 DIAGNOSIS — I65.21 STENOSIS OF RIGHT CAROTID ARTERY: ICD-10-CM

## 2022-09-09 PROCEDURE — 99214 OFFICE O/P EST MOD 30 MIN: CPT | Performed by: FAMILY MEDICINE

## 2022-09-09 RX ORDER — CYANOCOBALAMIN, ISOPROPYL ALCOHOL 1000MCG/ML
KIT INJECTION
COMMUNITY

## 2022-09-09 RX ORDER — CYANOCOBALAMIN 1000 UG/ML
1000 INJECTION, SOLUTION INTRAMUSCULAR; SUBCUTANEOUS
Status: SHIPPED | OUTPATIENT
Start: 2022-09-09

## 2022-09-09 RX ADMIN — CYANOCOBALAMIN 1000 MCG: 1000 INJECTION, SOLUTION INTRAMUSCULAR; SUBCUTANEOUS at 14:26

## 2022-09-09 NOTE — PROGRESS NOTES
Patient Name: Shelley Leach  : 1955   MRN: [id number]     Chief Complaint:    Chief Complaint   Patient presents with   • lab results     Pt here for lab results   • Diabetes   • Hyperlipidemia   • Hypertension       History of Present Illness: Shelley Leach is a 67 y.o. female who is here today for follow up on lipids and BP  HPI        Review of Systems:   Review of Systems   Constitutional: Negative.    HENT: Negative.    Eyes: Negative.    Respiratory: Negative.    Cardiovascular: Negative.    Gastrointestinal: Negative.    Neurological: Negative.         Past Medical History:   Past Medical History:   Diagnosis Date   • Bilateral carotid artery stenosis    • Clostridioides difficile infection     15 YEARS AGO, TREATED   • Dietary counseling     diet education-Abstracted from [facility]   • Dietary counseling and surveillance    • Diverticulosis    • Elevated cholesterol    • GERD (gastroesophageal reflux disease)    • History of thyroid disorder     Thyroid problem-Abstracted from [facility]   • History of transfusion     NO REACTION   • Hypertension    • Hypothyroidism    • Leaky heart valve    • PONV (postoperative nausea and vomiting)    • Type 2 diabetes mellitus, uncontrolled        Past Surgical History:   Past Surgical History:   Procedure Laterality Date   • BLEPHAROPLASTY Bilateral    • CARDIAC CATHETERIZATION      NO INTERVENTION-20 YRS AGO   • CAROTID ARTERY ANGIOPLASTY N/A    • CAROTID ENDARTERECTOMY Right 2021    Procedure: CAROTID ENDARTERECTOMY WITH EEG RIGHT;  Surgeon: Javed Morris MD;  Location: [facility] OR;  Service: Vascular;  Laterality: Right;   • CARPAL TUNNEL RELEASE Bilateral    • CATARACT EXTRACTION     • CHOLECYSTECTOMY     • COLONOSCOPY     • GASTRIC BYPASS     • HYSTERECTOMY     • INTERVENTIONAL RADIOLOGY PROCEDURE Bilateral 2021    Procedure: Carotid Cerebral Angiogram;  Surgeon: Sukumar Francis MD;  Location: [facility] CATH INVASIVE LOCATION;   Service: Interventional Radiology;  Laterality: Bilateral;   • OVARIAN CYST SURGERY      x 2   • ROTATOR CUFF REPAIR Left    • THYROIDECTOMY, PARTIAL     • TONSILLECTOMY         Family History:   Family History   Problem Relation Age of Onset   • Diabetes Mother    • Diabetes Father    • Hypertension Father    • Thyroid disease Father    • Kidney disease Father        Social History:   Social History     Socioeconomic History   • Marital status:    • Number of children: 2   Tobacco Use   • Smoking status: Never Smoker   • Smokeless tobacco: Never Used   Vaping Use   • Vaping Use: Never used   Substance and Sexual Activity   • Alcohol use: Never   • Drug use: Never   • Sexual activity: Defer       Medications:     Current Outpatient Medications:   •  aspirin 81 MG EC tablet, Take 81 mg by mouth Daily., Disp: , Rfl:   •  atorvastatin (LIPITOR) 10 MG tablet, Take 1 tablet by mouth Every Night., Disp: 90 tablet, Rfl: 1  •  Biotin 65294 MCG tablet dispersible, Place 10,000 mcg on the tongue 2 (two) times a day., Disp: , Rfl:   •  carvedilol (COREG) 6.25 MG tablet, TAKE 1 TABLET BY MOUTH EVERY 12 HOURS., Disp: 180 tablet, Rfl: 1  •  cetirizine (zyrTEC) 10 MG tablet, Take 1 tablet by mouth Daily., Disp: 90 tablet, Rfl: 1  •  clopidogrel (PLAVIX) 75 MG tablet, Take 1 tablet by mouth Daily., Disp: 90 tablet, Rfl: 1  •  Continuous Blood Gluc Sensor (FreeStyle Lissa 2 Sensor) misc, 1 Device Every 14 (Fourteen) Days., Disp: 2 each, Rfl: 5  •  Cyanocobalamin (B-12 Compliance Injection) 1000 MCG/ML kit, Inject  as directed., Disp: , Rfl:   •  estradiol (ESTRACE) 0.1 MG/GM vaginal cream, INSERT 1G VAGINALY TWICE A WEEK AT BEDTIME, Disp: , Rfl:   •  ezetimibe (ZETIA) 10 MG tablet, Take 1 tablet by mouth Daily., Disp: 90 tablet, Rfl: 1  •  famotidine (PEPCID) 40 MG tablet, , Disp: , Rfl:   •  fenofibrate (TRICOR) 145 MG tablet, Take 145 mg by mouth Daily., Disp: , Rfl:   •  ferrous sulfate 300 (60 Fe) MG/5ML syrup, Take 300  mg by mouth Daily With Breakfast., Disp: , Rfl:   •  fluconazole (DIFLUCAN) 150 MG tablet, Take 150 mg by mouth 1 (One) Time As Needed (YEAST). As needed, Disp: , Rfl:   •  gabapentin (NEURONTIN) 300 MG capsule, TAKE 1 CAPSULE BY MOUTH 3 (THREE) TIMES A DAY., Disp: 90 capsule, Rfl: 0  •  glucose blood test strip, Daily., Disp: , Rfl:   •  insulin aspart (NovoLOG) 100 UNIT/ML injection, 38 UNITS PER DAY VIA VGO, Disp: 20 mL, Rfl: 11  •  Insulin Disposable Pump (V-Go 20) kit, USE DAILY TO GIVE INSULIN, Disp: 30 each, Rfl: 11  •  levothyroxine (Synthroid) 112 MCG tablet, Take 1 tablet by mouth Daily., Disp: 90 tablet, Rfl: 3  •  losartan (COZAAR) 50 MG tablet, Take 1 tablet by mouth Daily., Disp: 90 tablet, Rfl: 1  •  Multiple Vitamins-Minerals (BARIATRIC FUSION PO), Take 1 tablet by mouth Daily. Hair skin and nails, Disp: , Rfl:   •  multivitamin (THERAGRAN) tablet tablet, take 1 tablet by oral route  every day with food, Disp: , Rfl:   •  Myrbetriq 25 MG tablet sustained-release 24 hour 24 hr tablet, TAKE 1 TABLET DAILY,       SWALLOWING WHOLE WITH      WATER. DO NOT CRUSH, CHEW, AND/OR DIVIDE, Disp: 90 tablet, Rfl: 0  •  Probiotic Product (TRUBIOTICS PO), Take 1 capsule by mouth Daily., Disp: , Rfl:   •  Repatha solution prefilled syringe injection, INJECT 1 ML UNDER THE SKIN INTO THE APPROPRIATE AREA AS DIRECTED EVERY 14  DAYS., Disp: 6 mL, Rfl: 5  •  sertraline (ZOLOFT) 50 MG tablet, TAKE 1 TABLET EVERY DAY, Disp: 90 tablet, Rfl: 1  •  sucralfate (CARAFATE) 1 g tablet, Take 1 g by mouth 4 (Four) Times a Day., Disp: , Rfl:   •  vitamin D3 125 MCG (5000 UT) capsule capsule, Take 10,000 Units by mouth Daily., Disp: , Rfl:   •  Alcohol Swabs (Alcohol Wipes) 70 % pads, As Needed., Disp: , Rfl:   •  vitamin C (ASCORBIC ACID) 250 MG tablet, Take 250 mg by mouth Daily., Disp: , Rfl:     Current Facility-Administered Medications:   •  cyanocobalamin injection 1,000 mcg, 1,000 mcg, Intramuscular, Q28 Days, Tom Salas  "MD GABE, 1,000 mcg at 09/09/22 1426    Facility-Administered Medications Ordered in Other Visits:   •  Chlorhexidine Gluconate Cloth 2 % pads 1 application, 1 application, Topical, Q12H PRN, Sarah Wan PA-C  •  Chlorhexidine Gluconate Cloth 2 % pads 1 application, 1 application, Topical, Q12H PRN, Jeffery Sumner PA    Allergies:   Allergies   Allergen Reactions   • Percocet [Oxycodone-Acetaminophen] Itching   • Morphine Other (See Comments)     HYPOTENSION   • Sulfamethoxazole-Trimethoprim Swelling   • Penicillins Rash         Physical Exam:  Vital Signs:   Vitals:    09/09/22 1403   BP: 122/80   BP Location: Left arm   Patient Position: Sitting   Cuff Size: Adult   Pulse: 66   Resp: 12   Temp: 97.8 °F (36.6 °C)   TempSrc: Temporal   SpO2: 96%   Weight: 70.9 kg (156 lb 3.2 oz)   Height: 160 cm (63\")   PainSc: 0-No pain     Body mass index is 27.67 kg/m².     Physical Exam  Vitals and nursing note reviewed.   Constitutional:       Appearance: Normal appearance. She is normal weight.   HENT:      Head: Normocephalic and atraumatic.      Right Ear: Tympanic membrane, ear canal and external ear normal.      Left Ear: Tympanic membrane, ear canal and external ear normal.      Nose: Nose normal.      Mouth/Throat:      Mouth: Mucous membranes are dry.      Pharynx: Oropharynx is clear.   Eyes:      Extraocular Movements: Extraocular movements intact.      Conjunctiva/sclera: Conjunctivae normal.      Pupils: Pupils are equal, round, and reactive to light.   Cardiovascular:      Rate and Rhythm: Normal rate and regular rhythm.      Pulses: Normal pulses.      Heart sounds: Normal heart sounds.   Pulmonary:      Effort: Pulmonary effort is normal.      Breath sounds: Normal breath sounds.   Musculoskeletal:      Cervical back: Normal range of motion and neck supple.   Feet:      Comments:      Neurological:      Mental Status: She is alert.         Procedures      Assessment/Plan:   Diagnoses and all orders for this " visit:    1. Vitamin B12 deficiency (Primary)  Assessment & Plan:  Vitamin B12 is 1110.    Orders:  -     cyanocobalamin injection 1,000 mcg  -     Vitamin B12; Future  -     Vitamin D 25 Hydroxy; Future  -     Lipid Panel; Future  -     Comprehensive Metabolic Panel; Future  -     TSH Rfx On Abnormal To Free T4; Future  -     CBC & Differential; Future    2. Stenosis of right carotid artery s/p RIGHT CEA 2021  Assessment & Plan:  Patient is doing well.  We will follow.    Orders:  -     Vitamin B12; Future  -     Vitamin D 25 Hydroxy; Future  -     Lipid Panel; Future  -     Comprehensive Metabolic Panel; Future  -     TSH Rfx On Abnormal To Free T4; Future  -     CBC & Differential; Future    3. Hypertension, essential  Assessment & Plan:  Discussed with patient to monitor their blood pressure and if systolic blood pressure goes above 140 or diastolic is above 90 to return to clinic.  Take medicines as directed, call for any problems, patient not having or any worrisome symptoms.        Orders:  -     Vitamin B12; Future  -     Vitamin D 25 Hydroxy; Future  -     Lipid Panel; Future  -     Comprehensive Metabolic Panel; Future  -     TSH Rfx On Abnormal To Free T4; Future  -     CBC & Differential; Future    4. Hyperlipidemia, mixed  Assessment & Plan:  HDL 40.  LDL 12.  Triglycerides 113.  Patient has done well with Repatha in addition to her atorvastatin and Zetia.  We will keep her on the current regimen.    Orders:  -     Vitamin B12; Future  -     Vitamin D 25 Hydroxy; Future  -     Lipid Panel; Future  -     Comprehensive Metabolic Panel; Future  -     TSH Rfx On Abnormal To Free T4; Future  -     CBC & Differential; Future    5. Vitamin D deficiency  Assessment & Plan:  Vitamin D is 44.9.  We will follow.    Orders:  -     Vitamin B12; Future  -     Vitamin D 25 Hydroxy; Future  -     Lipid Panel; Future  -     Comprehensive Metabolic Panel; Future  -     TSH Rfx On Abnormal To Free T4; Future  -     CBC &  Differential; Future    6. Type 2 diabetes mellitus with hyperglycemia, with long-term current use of insulin ([facility])  Assessment & Plan:  A1c is improved at 8.6.  Follow-up with Ant[facility].    Orders:  -     Vitamin B12; Future  -     Vitamin D 25 Hydroxy; Future  -     Lipid Panel; Future  -     Comprehensive Metabolic Panel; Future  -     TSH Rfx On Abnormal To Free T4; Future  -     CBC & Differential; Future    7. Acquired hypothyroidism  -     Vitamin B12; Future  -     Vitamin D 25 Hydroxy; Future  -     Lipid Panel; Future  -     Comprehensive Metabolic Panel; Future  -     TSH Rfx On Abnormal To Free T4; Future  -     CBC & Differential; Future    8. Chronic kidney disease, stage 3b (HCC)  Assessment & Plan:  Patient is instructed to not take any NSAIDs.  Medicines as directed.  Stay well-hydrated.      Orders:  -     Vitamin B12; Future  -     Vitamin D 25 Hydroxy; Future  -     Lipid Panel; Future  -     Comprehensive Metabolic Panel; Future  -     TSH Rfx On Abnormal To Free T4; Future  -     CBC & Differential; Future    9. Incontinence of feces, unspecified fecal incontinence type  Assessment & Plan:  Patient would like to see a specialist for this.  I am going to  get her appointment to see Dr. CARRINGTON in [place].    Orders:  -     Vitamin B12; Future  -     Vitamin D 25 Hydroxy; Future  -     Lipid Panel; Future  -     Comprehensive Metabolic Panel; Future  -     TSH Rfx On Abnormal To Free T4; Future  -     CBC & Differential; Future    10. Postmenopausal  -     DEXA Bone Density Axial; Future           Follow Up:   Return in about 6 months (around 3/9/2023) for Bloodwork 1 week prior to next appointment.    Tom Salas MD  [facility]     Answers for HPI/ROS submitted by the patient on 9/8/2022  Please describe your symptoms.: Check up  Have you had these symptoms before?: Yes  How long have you been having these symptoms?: 1-4 days  Please list any medications you are  currently taking for this condition.: Na  What is the primary reason for your visit?: Other

## 2022-09-12 RX ORDER — EZETIMIBE 10 MG/1
TABLET ORAL
Qty: 90 TABLET | Refills: 1 | OUTPATIENT
Start: 2022-09-12

## 2022-09-12 RX ORDER — LOSARTAN POTASSIUM 50 MG/1
TABLET ORAL
Qty: 90 TABLET | Refills: 1 | OUTPATIENT
Start: 2022-09-12

## 2022-10-17 RX ORDER — ATORVASTATIN CALCIUM 10 MG/1
TABLET, FILM COATED ORAL
Qty: 90 TABLET | Refills: 1 | OUTPATIENT
Start: 2022-10-17

## 2022-11-04 ENCOUNTER — TELEPHONE (OUTPATIENT)
Dept: FAMILY MEDICINE CLINIC | Facility: CLINIC | Age: 67
End: 2022-11-04

## 2022-11-04 NOTE — TELEPHONE ENCOUNTER
[employer], WANTED TO KNOW IF THERE HAS BEEN A CHANGE IN PT CHOLESTEROL READINGS SINCE BEING ON REPATHA, ANSWER YES, THEY WILL FAX OVER THE DETERMINATION ON THIS MEDICATION TO OUR OFFICE

## 2022-11-04 NOTE — TELEPHONE ENCOUNTER
Post from [employer] called stating they had some extra questions regarding pt's Prior Auth for Repatha.    Reference number is [id number] and CB is [phone]

## 2022-11-14 ENCOUNTER — TELEPHONE (OUTPATIENT)
Dept: FAMILY MEDICINE CLINIC | Facility: CLINIC | Age: 67
End: 2022-11-14

## 2022-11-16 RX ORDER — CETIRIZINE HYDROCHLORIDE 10 MG/1
TABLET ORAL
Qty: 90 TABLET | Refills: 1 | Status: SHIPPED | OUTPATIENT
Start: 2022-11-16

## 2022-11-16 NOTE — TELEPHONE ENCOUNTER
Rx Refill Note    Requested Prescriptions     Pending Prescriptions Disp Refills   • cetirizine (zyrTEC) 10 MG tablet [Pharmacy Med Name: CETIRIZINE HYDROCHLORIDE 10 MG Tablet] 90 tablet 1     Sig: TAKE 1 TABLET EVERY DAY        Last office visit with prescribing clinician: 9/9/2022      Next office visit with prescribing clinician: 3/10/2023   Last labs:   Last refill:    Pharmacy [facility]

## 2022-11-17 RX ORDER — CLOPIDOGREL BISULFATE 75 MG/1
TABLET ORAL
Qty: 90 TABLET | Refills: 3 | Status: SHIPPED | OUTPATIENT
Start: 2022-11-17

## 2022-11-17 NOTE — TELEPHONE ENCOUNTER
Rx Refill Note    Requested Prescriptions     Pending Prescriptions Disp Refills   • atorvastatin (LIPITOR) 10 MG tablet [Pharmacy Med Name: ATORVASTATIN CALCIUM 10 MG Tablet] 90 tablet 0     Sig: TAKE 1 TABLET EVERY DAY   • levothyroxine (SYNTHROID, LEVOTHROID) 100 MCG tablet [Pharmacy Med Name: LEVOTHYROXINE SODIUM 100 MCG Tablet] 90 tablet      Sig: TAKE 1 TABLET EVERY DAY        Last office visit with prescribing clinician: 9/9/2022      Next office visit with prescribing clinician: 3/10/2023   Last labs:   Last refill: appears that the levothyroxine is per Dr. Talbot   Pharmacy (be sure to add in Epic). correct

## 2022-11-18 RX ORDER — LEVOTHYROXINE SODIUM 0.1 MG/1
TABLET ORAL
Qty: 90 TABLET | Refills: 1 | Status: SHIPPED | OUTPATIENT
Start: 2022-11-18

## 2022-11-18 RX ORDER — ATORVASTATIN CALCIUM 10 MG/1
TABLET, FILM COATED ORAL
Qty: 90 TABLET | Refills: 0 | Status: SHIPPED | OUTPATIENT
Start: 2022-11-18 | End: 2023-03-24 | Stop reason: SDUPTHER

## 2022-11-22 DIAGNOSIS — R42 DIZZINESS: Primary | ICD-10-CM

## 2022-11-22 DIAGNOSIS — I65.23 BILATERAL CAROTID ARTERY STENOSIS: ICD-10-CM

## 2022-12-06 ENCOUNTER — TELEPHONE (OUTPATIENT)
Dept: FAMILY MEDICINE CLINIC | Facility: CLINIC | Age: 67
End: 2022-12-06

## 2022-12-06 NOTE — TELEPHONE ENCOUNTER
Caller: Shelley Leach    Relationship to patient: Self    Best call back number: [phone]    Chief complaint: RUNNY, STUFFY NOSE, COUGH & CONGESTION    Type of visit: ANY    Requested date: ASAP     If rescheduling, when is the original appointment: NONE    Additional notes:PLEASE CALL WITH FIRST AVAILABLE

## 2022-12-08 ENCOUNTER — TELEPHONE (OUTPATIENT)
Dept: FAMILY MEDICINE CLINIC | Facility: CLINIC | Age: 67
End: 2022-12-08

## 2022-12-08 NOTE — TELEPHONE ENCOUNTER
Talk to patient.  She has COVID.  She has multiple risk factors that put her at risk.  Her last GFR was 52.  We are to call in a renal dose of Paxlovid

## 2022-12-08 NOTE — TELEPHONE ENCOUNTER
Patient has COVID tested positive yesterday but been sick since Monday, last GFR was 52   Send to [facility]

## 2022-12-23 ENCOUNTER — APPOINTMENT (OUTPATIENT)
Dept: CARDIOLOGY | Facility: HOSPITAL | Age: 67
End: 2022-12-23

## 2022-12-26 DIAGNOSIS — G62.9 NEUROPATHY: ICD-10-CM

## 2022-12-27 RX ORDER — GABAPENTIN 300 MG/1
CAPSULE ORAL
Qty: 90 CAPSULE | Refills: 0 | Status: SHIPPED | OUTPATIENT
Start: 2022-12-27 | End: 2023-03-06 | Stop reason: SDUPTHER

## 2022-12-27 NOTE — TELEPHONE ENCOUNTER
Rx Refill Note    Requested Prescriptions     Pending Prescriptions Disp Refills   • gabapentin (NEURONTIN) 300 MG capsule [Pharmacy Med Name: GABAPENTIN 300 MG Capsule] 90 capsule 0     Sig: TAKE 1 CAPSULE THREE TIMES DAILY        Last office visit with prescribing clinician: 9/9/2022      Next office visit with prescribing clinician: 3/10/2023   Last labs:   Last refill: 08/29/2022   Pharmacy (be sure to add in Epic). correct

## 2022-12-28 ENCOUNTER — HOSPITAL ENCOUNTER (OUTPATIENT)
Dept: CARDIOLOGY | Facility: HOSPITAL | Age: 67
Discharge: HOME | End: 2022-12-28
Admitting: NURSE PRACTITIONER

## 2022-12-28 VITALS — BODY MASS INDEX: 27.64 KG/M2 | HEIGHT: 63 IN | WEIGHT: 156 LBS

## 2022-12-28 DIAGNOSIS — I65.23 BILATERAL CAROTID ARTERY STENOSIS: ICD-10-CM

## 2022-12-28 DIAGNOSIS — R42 DIZZINESS: ICD-10-CM

## 2022-12-28 LAB
BH CV XLRA MEAS LEFT DIST CCA EDV: 6.7 CM/SEC
BH CV XLRA MEAS LEFT DIST CCA PSV: 71.7 CM/SEC
BH CV XLRA MEAS LEFT MID CCA EDV: 9 CM/SEC
BH CV XLRA MEAS LEFT MID CCA PSV: 86.3 CM/SEC
BH CV XLRA MEAS LEFT PROX CCA EDV: 5.5 CM/SEC
BH CV XLRA MEAS LEFT PROX CCA PSV: 74.2 CM/SEC
BH CV XLRA MEAS LEFT PROX ECA EDV: 7.3 CM/SEC
BH CV XLRA MEAS LEFT PROX ECA PSV: 114.2 CM/SEC
BH CV XLRA MEAS LEFT PROX SCLA PSV: 148.6 CM/SEC
BH CV XLRA MEAS LEFT VERTEBRAL A EDV: 26.3 CM/SEC
BH CV XLRA MEAS LEFT VERTEBRAL A PSV: 81.1 CM/SEC
BH CV XLRA MEAS RIGHT DIST CCA EDV: 20.5 CM/SEC
BH CV XLRA MEAS RIGHT DIST CCA PSV: 90.5 CM/SEC
BH CV XLRA MEAS RIGHT DIST ICA EDV: 35.7 CM/SEC
BH CV XLRA MEAS RIGHT DIST ICA PSV: 125.1 CM/SEC
BH CV XLRA MEAS RIGHT ICA/CCA RATIO: 1.44
BH CV XLRA MEAS RIGHT MID CCA EDV: 18 CM/SEC
BH CV XLRA MEAS RIGHT MID CCA PSV: 88 CM/SEC
BH CV XLRA MEAS RIGHT MID ICA EDV: 30.6 CM/SEC
BH CV XLRA MEAS RIGHT MID ICA PSV: 116.5 CM/SEC
BH CV XLRA MEAS RIGHT PROX CCA EDV: 11.4 CM/SEC
BH CV XLRA MEAS RIGHT PROX CCA PSV: 71.5 CM/SEC
BH CV XLRA MEAS RIGHT PROX ECA EDV: 10.4 CM/SEC
BH CV XLRA MEAS RIGHT PROX ECA PSV: 171.7 CM/SEC
BH CV XLRA MEAS RIGHT PROX ICA EDV: 23.7 CM/SEC
BH CV XLRA MEAS RIGHT PROX ICA PSV: 126.8 CM/SEC
BH CV XLRA MEAS RIGHT PROX SCLA PSV: 172.7 CM/SEC
BH CV XLRA MEAS RIGHT VERTEBRAL A EDV: 19.9 CM/SEC
BH CV XLRA MEAS RIGHT VERTEBRAL A PSV: 69.2 CM/SEC
LEFT ARM BP: NORMAL MMHG
MAXIMAL PREDICTED HEART RATE: 155 BPM
RIGHT ARM BP: NORMAL MMHG
STRESS TARGET HR: 132 BPM

## 2022-12-28 PROCEDURE — 93880 EXTRACRANIAL BILAT STUDY: CPT

## 2022-12-30 RX ORDER — EZETIMIBE 10 MG/1
10 TABLET ORAL DAILY
Qty: 90 TABLET | Refills: 1 | Status: SHIPPED | OUTPATIENT
Start: 2022-12-30 | End: 2023-03-29

## 2022-12-30 RX ORDER — EVOLOCUMAB 140 MG/ML
140 INJECTION, SOLUTION SUBCUTANEOUS
Qty: 6 ML | Refills: 5 | Status: SHIPPED | OUTPATIENT
Start: 2022-12-30

## 2023-01-19 DIAGNOSIS — F41.9 ANXIETY: Primary | ICD-10-CM

## 2023-01-19 RX ORDER — LORAZEPAM 0.5 MG/1
0.5 TABLET ORAL EVERY 8 HOURS PRN
COMMUNITY
End: 2023-01-19 | Stop reason: SDUPTHER

## 2023-01-19 RX ORDER — LORAZEPAM 0.5 MG/1
0.5 TABLET ORAL EVERY 8 HOURS PRN
Qty: 30 TABLET | Refills: 0 | Status: SHIPPED | OUTPATIENT
Start: 2023-01-19

## 2023-01-20 ENCOUNTER — OFFICE VISIT (OUTPATIENT)
Dept: ENDOCRINOLOGY | Facility: CLINIC | Age: 68
End: 2023-01-20
Payer: MEDICARE

## 2023-01-20 VITALS
HEIGHT: 62 IN | WEIGHT: 160 LBS | BODY MASS INDEX: 29.44 KG/M2 | SYSTOLIC BLOOD PRESSURE: 116 MMHG | DIASTOLIC BLOOD PRESSURE: 78 MMHG

## 2023-01-20 DIAGNOSIS — E11.65 UNCONTROLLED TYPE 2 DIABETES MELLITUS WITH HYPERGLYCEMIA: Primary | ICD-10-CM

## 2023-01-20 LAB
EXPIRATION DATE: NORMAL
EXPIRATION DATE: NORMAL
GLUCOSE BLDC GLUCOMTR-MCNC: 109 MG/DL (ref 70–130)
HBA1C MFR BLD: 8.6 %
Lab: NORMAL
Lab: NORMAL

## 2023-01-20 PROCEDURE — 82947 ASSAY GLUCOSE BLOOD QUANT: CPT | Performed by: INTERNAL MEDICINE

## 2023-01-20 PROCEDURE — 99213 OFFICE O/P EST LOW 20 MIN: CPT | Performed by: INTERNAL MEDICINE

## 2023-01-20 PROCEDURE — 3052F HG A1C>EQUAL 8.0%<EQUAL 9.0%: CPT | Performed by: INTERNAL MEDICINE

## 2023-01-20 PROCEDURE — 83036 HEMOGLOBIN GLYCOSYLATED A1C: CPT | Performed by: INTERNAL MEDICINE

## 2023-01-20 RX ORDER — INSULIN PMP CART,AUT,G6/7,CNTR
1 EACH SUBCUTANEOUS TAKE AS DIRECTED
Qty: 1 KIT | Refills: 0 | Status: SHIPPED | OUTPATIENT
Start: 2023-01-20 | End: 2023-02-27

## 2023-01-20 NOTE — PROGRESS NOTES
"     Office Note      Date: 2023  Patient Name: Shelley Leach  MRN: [id number]  : 1955    Chief Complaint   Patient presents with   • Diabetes       History of Present Illness:   Shelley Leach is a 67 y.o. female who presents for Diabetes type 2.   Current RX insulin in a vgo 20     Bg checks are done:>10 times per day with a papito  Hypoglycemia :frequent mild.   Pattern on her phone shows nocturnal hypoglycemia and daytime hyperglycemia       Last A1c:  Hemoglobin A1C   Date Value Ref Range Status   2023 8.6 % Final   2021 10.40 (H) 4.80 - 5.60 % Final       Changes in health since last visit: under a lot of stress . Last eye exam  Up to date.    Subjective          Review of Systems:   Review of Systems   Constitutional: Negative.    HENT: Negative.    Eyes: Negative.    Respiratory: Negative.        The following portions of the patient's history were reviewed and updated as appropriate: allergies, current medications, past family history, past medical history, past social history, past surgical history and problem list.    Objective     Visit Vitals  /78   Ht 157.5 cm (62\")   Wt 72.6 kg (160 lb)   BMI 29.26 kg/m²           Physical Exam:  Physical Exam  Vitals reviewed.   Constitutional:       Appearance: Normal appearance.   Neurological:      Mental Status: She is alert.   Psychiatric:         Mood and Affect: Mood normal.         Behavior: Behavior normal.         Thought Content: Thought content normal.         Judgment: Judgment normal.          Assessment / Plan      Assessment & Plan:  Problem List Items Addressed This Visit        Other    Uncontrolled type 2 diabetes mellitus with hyperglycemia (HCC) - Primary    Current Assessment & Plan     The vgo 20 is the smallest one of those that is made, yet it is giving too much basal insulin. She has to go to true pump therapy to get he numbers more in line.          Relevant Medications    insulin aspart (NovoLOG) " 100 UNIT/ML injection    Other Relevant Orders    POC Glucose, Blood (Completed)    POC Glycosylated Hemoglobin (Hb A1C) (Completed)        Sonu Talbot MD   01/20/2023

## 2023-01-20 NOTE — ASSESSMENT & PLAN NOTE
The vgo 20 is the smallest one of those that is made, yet it is giving too much basal insulin. She has to go to true pump therapy to get he numbers more in line.

## 2023-01-23 ENCOUNTER — OFFICE VISIT (OUTPATIENT)
Dept: CARDIAC SURGERY | Facility: CLINIC | Age: 68
End: 2023-01-23
Payer: MEDICARE

## 2023-01-23 VITALS
HEIGHT: 60 IN | HEART RATE: 66 BPM | DIASTOLIC BLOOD PRESSURE: 80 MMHG | TEMPERATURE: 96.3 F | BODY MASS INDEX: 31.41 KG/M2 | OXYGEN SATURATION: 98 % | SYSTOLIC BLOOD PRESSURE: 170 MMHG | WEIGHT: 160 LBS

## 2023-01-23 DIAGNOSIS — I65.21 STENOSIS OF RIGHT CAROTID ARTERY: Primary | ICD-10-CM

## 2023-01-23 DIAGNOSIS — I10 HYPERTENSION, ESSENTIAL: ICD-10-CM

## 2023-01-23 DIAGNOSIS — I65.22 LEFT CAROTID ARTERY OCCLUSION: ICD-10-CM

## 2023-01-23 PROBLEM — I65.23 BILATERAL CAROTID ARTERY STENOSIS: Status: RESOLVED | Noted: 2021-07-12 | Resolved: 2023-01-23

## 2023-01-23 PROCEDURE — 99213 OFFICE O/P EST LOW 20 MIN: CPT | Performed by: NURSE PRACTITIONER

## 2023-01-23 NOTE — PROGRESS NOTES
[facility] Cardiothoracic Surgery Office Follow Up Note     Date of Encounter: 2023     Name: Shelley Leach  : 1955     Referred By: No ref. provider found  PCP: Tom Salas MD    Chief Complaint:    Chief Complaint   Patient presents with   • Follow-up     1 YR FU with Carotid Duplex       Subjective      History of Present Illness:    Shelley Leach is a 67 y.o. female non-smoker with history of HTN, HLD on statin therapy, insulin-dependent DM (HA1c 8.6), CKD III, and carotid disease with known left ICA occlusion and symptomatic right stenosis s/p right carotid endarterectomy with pericardial patch on 2021 by Dr. Morris.  She presents today for her annual carotid surveillance. Pt denies hx of TIA or CVA since last visit, has had no new focal neurologic dysfunction, specifically vision changes or amaurosis fugax. She is having dizziness upon standing that she associates with losartan (this went away when her medicine was d/silke). Of note her mother recently passed away from a stroke.     Review of Systems:  Review of Systems   Constitutional: Negative for chills, decreased appetite, diaphoresis, fever, malaise/fatigue, night sweats, weight gain and weight loss.   HENT: Negative for hoarse voice.    Eyes: Negative for blurred vision, double vision and visual disturbance.   Cardiovascular: Negative for chest pain, claudication, dyspnea on exertion, irregular heartbeat, leg swelling, near-syncope, orthopnea, palpitations, paroxysmal nocturnal dyspnea and syncope.   Respiratory: Negative for cough, hemoptysis, shortness of breath, sputum production and wheezing.    Hematologic/Lymphatic: Negative for adenopathy and bleeding problem. Bruises/bleeds easily.   Skin: Positive for rash (left groin area). Negative for color change, nail changes and poor wound healing.   Musculoskeletal: Positive for back pain. Negative for falls and muscle cramps.   Gastrointestinal: Negative  for abdominal pain, dysphagia and heartburn.   Genitourinary: Negative for flank pain.   Neurological: Positive for dizziness. Negative for brief paralysis, disturbances in coordination, focal weakness, headaches, light-headedness, loss of balance, numbness, paresthesias, sensory change, vertigo and weakness.   Psychiatric/Behavioral: Positive for depression. Negative for suicidal ideas. The patient is nervous/anxious.    Allergic/Immunologic: Negative for persistent infections.       I have reviewed the following portions of the patient's history: allergies, current medications, past family history, past medical history, past social history, past surgical history and problem list and confirm it's accurate.    Allergies:  Allergies   Allergen Reactions   • Percocet [Oxycodone-Acetaminophen] Itching   • Morphine Other (See Comments)     HYPOTENSION   • Sulfamethoxazole-Trimethoprim Swelling   • Penicillins Rash       Medications:      Current Outpatient Medications:   •  aspirin 81 MG EC tablet, Take 81 mg by mouth Daily., Disp: , Rfl:   •  atorvastatin (LIPITOR) 10 MG tablet, TAKE 1 TABLET EVERY DAY, Disp: 90 tablet, Rfl: 0  •  Biotin 38620 MCG tablet dispersible, Place 10,000 mcg on the tongue 2 (two) times a day., Disp: , Rfl:   •  carvedilol (COREG) 6.25 MG tablet, TAKE 1 TABLET BY MOUTH EVERY 12 HOURS., Disp: 180 tablet, Rfl: 1  •  cetirizine (zyrTEC) 10 MG tablet, TAKE 1 TABLET EVERY DAY, Disp: 90 tablet, Rfl: 1  •  clopidogrel (PLAVIX) 75 MG tablet, TAKE 1 TABLET EVERY DAY, Disp: 90 tablet, Rfl: 3  •  Continuous Blood Gluc Sensor (FreeStyle Lissa 2 Sensor) misc, 1 Device Every 14 (Fourteen) Days., Disp: 2 each, Rfl: 5  •  Cyanocobalamin (B-12 Compliance Injection) 1000 MCG/ML kit, Inject  as directed., Disp: , Rfl:   •  estradiol (ESTRACE) 0.1 MG/GM vaginal cream, INSERT 1G VAGINALY TWICE A WEEK AT BEDTIME, Disp: , Rfl:   •  Evolocumab (Repatha) solution prefilled syringe injection, Inject 1 mL under the skin  into the appropriate area as directed Every 14 (Fourteen) Days., Disp: 6 mL, Rfl: 5  •  ezetimibe (ZETIA) 10 MG tablet, Take 1 tablet by mouth Daily., Disp: 90 tablet, Rfl: 1  •  famotidine (PEPCID) 40 MG tablet, , Disp: , Rfl:   •  fenofibrate (TRICOR) 145 MG tablet, Take 145 mg by mouth Daily., Disp: , Rfl:   •  ferrous sulfate 300 (60 Fe) MG/5ML syrup, Take 300 mg by mouth Daily With Breakfast., Disp: , Rfl:   •  fluconazole (DIFLUCAN) 150 MG tablet, Take 150 mg by mouth 1 (One) Time As Needed (YEAST). As needed, Disp: , Rfl:   •  gabapentin (NEURONTIN) 300 MG capsule, TAKE 1 CAPSULE THREE TIMES DAILY, Disp: 90 capsule, Rfl: 0  •  glucose blood test strip, Daily., Disp: , Rfl:   •  insulin aspart (NovoLOG) 100 UNIT/ML injection, 38 UNITS PER DAY VIA VGO, Disp: 20 mL, Rfl: 11  •  Insulin Disposable Pump (Omnipod 5 G6 Intro, Gen 5,) kit, 1 kit Take As Directed., Disp: 1 kit, Rfl: 0  •  levothyroxine (SYNTHROID, LEVOTHROID) 100 MCG tablet, TAKE 1 TABLET EVERY DAY, Disp: 90 tablet, Rfl: 1  •  LORazepam (ATIVAN) 0.5 MG tablet, Take 1 tablet by mouth Every 8 (Eight) Hours As Needed for Anxiety., Disp: 30 tablet, Rfl: 0  •  losartan (COZAAR) 50 MG tablet, Take 1 tablet by mouth Daily., Disp: 90 tablet, Rfl: 1  •  Multiple Vitamins-Minerals (BARIATRIC FUSION PO), Take 1 tablet by mouth Daily. Hair skin and nails, Disp: , Rfl:   •  multivitamin (THERAGRAN) tablet tablet, take 1 tablet by oral route  every day with food, Disp: , Rfl:   •  Myrbetriq 25 MG tablet sustained-release 24 hour 24 hr tablet, TAKE 1 TABLET DAILY,       SWALLOWING WHOLE WITH      WATER. DO NOT CRUSH, CHEW, AND/OR DIVIDE, Disp: 90 tablet, Rfl: 0  •  Probiotic Product (TRUBIOTICS PO), Take 1 capsule by mouth Daily., Disp: , Rfl:   •  sertraline (ZOLOFT) 50 MG tablet, TAKE 1 TABLET EVERY DAY, Disp: 90 tablet, Rfl: 1  •  sucralfate (CARAFATE) 1 g tablet, Take 1 g by mouth 4 (Four) Times a Day., Disp: , Rfl:   •  vitamin C (ASCORBIC ACID) 250 MG tablet,  Take 250 mg by mouth Daily., Disp: , Rfl:   •  vitamin D3 125 MCG (5000 UT) capsule capsule, Take 10,000 Units by mouth Daily., Disp: , Rfl:   •  Insulin Disposable Pump (V-Go 20) kit, USE DAILY TO GIVE INSULIN (Patient not taking: Reported on 1/23/2023), Disp: 30 each, Rfl: 11    Current Facility-Administered Medications:   •  cyanocobalamin injection 1,000 mcg, 1,000 mcg, Intramuscular, Q28 Days, Tom Salas MD, 1,000 mcg at 09/09/22 1426    Facility-Administered Medications Ordered in Other Visits:   •  Chlorhexidine Gluconate Cloth 2 % pads 1 application, 1 application, Topical, Q12H PRN, Sarah Wan PA-C  •  Chlorhexidine Gluconate Cloth 2 % pads 1 application, 1 application, Topical, Q12H PRN, Jeffery Sumner PA    History:   Past Medical History:   Diagnosis Date   • Bilateral carotid artery stenosis    • Clostridioides difficile infection     15 YEARS AGO, TREATED   • Dietary counseling     diet education-Abstracted from [place]   • Dietary counseling and surveillance    • Diverticulosis    • Elevated cholesterol    • GERD (gastroesophageal reflux disease)    • Goiter    • History of thyroid disorder     Thyroid problem-Abstracted from [place]   • History of transfusion     NO REACTION   • Hypertension    • Hypothyroidism    • Leaky heart valve 2021   • PONV (postoperative nausea and vomiting)    • Type 2 diabetes mellitus, uncontrolled    • Vitamin D deficiency        Past Surgical History:   Procedure Laterality Date   • BLEPHAROPLASTY Bilateral    • CARDIAC CATHETERIZATION      NO INTERVENTION-20 YRS AGO   • CAROTID ARTERY ANGIOPLASTY N/A    • CAROTID ENDARTERECTOMY Right 12/22/2021    Procedure: CAROTID ENDARTERECTOMY WITH EEG RIGHT;  Surgeon: Javed Morris MD;  Location: [facility];  Service: Vascular;  Laterality: Right;   • CARPAL TUNNEL RELEASE Bilateral    • CATARACT EXTRACTION     • CHOLECYSTECTOMY     • COLONOSCOPY     • GASTRIC BYPASS     • HYSTERECTOMY     • INTERVENTIONAL  "RADIOLOGY PROCEDURE Bilateral 11/09/2021    Procedure: Carotid Cerebral Angiogram;  Surgeon: Sukumar Francis MD;  Location: [facility] INVASIVE LOC[facility];  Service: Interventional Radiology;  Laterality: Bilateral;   • OVARIAN CYST SURGERY      x 2   • ROTATOR CUFF REPAIR Left    • THYROIDECTOMY, PARTIAL     • TONSILLECTOMY         Social History     Socioeconomic History   • Marital status:    • Number of children: 2   Tobacco Use   • Smoking status: Never   • Smokeless tobacco: Never   Vaping Use   • Vaping Use: Never used   Substance and Sexual Activity   • Alcohol use: Never   • Drug use: Never   • Sexual activity: Yes     Partners: Male     Birth control/protection: Hysterectomy        Family History   Problem Relation Age of Onset   • Diabetes Mother    • Anemia Mother    • Cancer Mother    • Hypertension Mother    • Diabetes Father    • Hypertension Father    • Thyroid disease Father    • Kidney disease Father    • Cancer Father    • Obesity Father    • Kidney disease Maternal Grandfather    • Hypertension Maternal Grandmother    • Obesity Paternal Grandmother    • Cancer Maternal Aunt    • Cancer Paternal Aunt         Breast   • Diabetes Paternal Aunt    • Cancer Maternal Uncle         Bladder   • Diabetes Maternal Uncle    • Hypertension Maternal Uncle    • Kidney disease Maternal Uncle    • Obesity Maternal Uncle    • Cancer Maternal Aunt         Breast   • Diabetes Maternal Aunt    • Diabetes Brother    • Hypertension Brother    • Kidney disease Brother    • Obesity Brother    • Diabetes Maternal Uncle    • Diabetes Maternal Aunt        Objective     Physical Exam:  Vitals:    01/23/23 1006 01/23/23 1007   BP: 168/80 170/80   BP Location: Left arm Right arm   Patient Position: Sitting Sitting   Pulse: 66    Temp: 96.3 °F (35.7 °C)    SpO2: 98%    Weight: 72.6 kg (160 lb)    Height: 152.4 cm (60\")       Body mass index is 31.25 kg/m².    Physical Exam  Vitals and nursing note reviewed. "   Constitutional:       Appearance: Normal appearance. She is obese.   Neck:      Vascular: Carotid bruit present.      Comments: left  Cardiovascular:      Rate and Rhythm: Normal rate.   Pulmonary:      Effort: Pulmonary effort is normal.   Skin:     General: Skin is warm and dry.      Coloration: Skin is pale.   Neurological:      Mental Status: She is alert and oriented to person, place, and time. Mental status is at baseline.         Imaging/Labs:  Duplex Carotid Ultrasound CAR-12/28/2022  •  Right internal carotid artery stenosis of 0-49%.  •  Left internal carotid artery is totally occluded.    Carotid Cerebral Angiogram-Dr Patino Given-11/9/2021  1.  Chronic occlusion of the cervical left ICA.  There is robust collateral flow to the left cerebral hemisphere via patent anterior and posterior communicating arteries.  2.  Advanced plaque formation at the right carotid bifurcation, resulting in a high-grade (greater than 70%) stenosis utilizing NASCET criteria.     Carotid duplex ([facility])- 6/25/2021  Elevated ICA velocities. Findings based on velocity criteria are consistent with moderate stenosis, in the range of 50-69%. Left occluded ICA    Assessment / Plan      Assessment / Plan:  Diagnoses and all orders for this visit:    1. Stenosis of right carotid artery s/p RIGHT CEA 2021 (Primary)  2. Left carotid artery occlusion  · s/p right carotid endarterectomy with pericardial patch on 12/22/2021 by Dr. Morris.    · Annual surveillance with no interval TIA or CVA symptoms  · Carotid duplex with known left ICA occlusion.  Right ICA mild stenosis in the range of 0-49%.  PSV VIJAY 126 cm/s.  Bilateral vertebrals antegrade  · Continue medical management with DAPT and statin therapy  · Annual surveillance with carotid duplex due 1/2023    3. Hypertension, essential  · Dizziness upon standing associated with her losartan which resolved with d/c  · Asymptomatically hypertensive in clinic  today  · I have encouraged patient to reach out to PCP to discuss antihypertensive management     Patient Education:  The BE FAST acronym helps you remember the signs and symptoms of a stroke: Balance loss, Eyesight loss, Facial dropping, Arm weakness, Speech difficulty, and Time to call 911      Follow Up:   Return in about 1 year (around 1/23/2024) for Imaging next visit: carotid duplex.   Or sooner for any further concerns or worsening sign and symptoms. If unable to reach us in the office please dial 911 or go to the nearest emergency department.      YUAN William  [facility] Cardiothoracic Surgery    Time Spent: I spent 21 minutes caring for Alberta on this date of service. This time includes time spent by me in the following activities: preparing for the visit, reviewing tests, obtaining and/or reviewing a separately obtained history, performing a medically appropriate examination and/or evaluation, counseling and educating the patient/family/caregiver, ordering medications, tests, or procedures, documenting information in the medical record, independently interpreting results and communicating that information with the patient/family/caregiver and care coordination.     continue current management. Sister as of now still has not found a place for Patient

## 2023-01-25 ENCOUNTER — TELEPHONE (OUTPATIENT)
Dept: ENDOCRINOLOGY | Facility: CLINIC | Age: 68
End: 2023-01-25
Payer: MEDICARE

## 2023-01-25 DIAGNOSIS — E11.65 UNCONTROLLED TYPE 2 DIABETES MELLITUS WITH HYPERGLYCEMIA: Primary | ICD-10-CM

## 2023-01-30 RX ORDER — VENLAFAXINE 37.5 MG/1
TABLET ORAL
Qty: 180 TABLET | Refills: 0 | Status: SHIPPED | OUTPATIENT
Start: 2023-01-30 | End: 2023-03-30

## 2023-01-30 NOTE — TELEPHONE ENCOUNTER
Rx Refill Note    Requested Prescriptions     Pending Prescriptions Disp Refills   • venlafaxine (EFFEXOR) 37.5 MG tablet [Pharmacy Med Name: VENLAFAXINE HCL 37.5 MG Tablet] 180 tablet 0     Sig: TAKE 1 TABLET TWICE DAILY        Last office visit with prescribing clinician: 9/9/2022      Next office visit with prescribing clinician: 3/10/2023   Last labs:   Last refill: n/a   Pharmacy (be sure to add in Epic). correct

## 2023-02-17 ENCOUNTER — OFFICE VISIT (OUTPATIENT)
Dept: DIABETES SERVICES | Facility: HOSPITAL | Age: 68
End: 2023-02-17
Payer: MEDICARE

## 2023-02-17 PROCEDURE — G0108 DIAB MANAGE TRN  PER INDIV: HCPCS

## 2023-02-17 NOTE — CONSULTS
Diabetes Education    Patient Name:  Shelley Leach  YOB: 1955  MRN: [id number]  Admit Date:  (Not on file)        1hr DM education completed with pt and spouse for preparation for pump therapy. Provided Dexcom training, reviewed pre-pump education, primarily focused on carb counting and address pt questions/concerns. Pump training scheduled for 2/27. Encouraged pt to call with questions as needed.       Electronically signed by:  Heather Dyer  02/17/23 15:19 EST

## 2023-02-27 ENCOUNTER — OFFICE VISIT (OUTPATIENT)
Dept: DIABETES SERVICES | Facility: HOSPITAL | Age: 68
End: 2023-02-27
Payer: MEDICARE

## 2023-02-27 RX ORDER — INSULIN PMP CART,AUT,G6/7,CNTR
1 EACH SUBCUTANEOUS
Qty: 10 EACH | Refills: 11 | Status: SHIPPED | OUTPATIENT
Start: 2023-02-27

## 2023-02-27 RX ORDER — INSULIN ASPART 100 [IU]/ML
INJECTION, SOLUTION INTRAVENOUS; SUBCUTANEOUS
Qty: 30 ML | Refills: 12 | Status: SHIPPED | OUTPATIENT
Start: 2023-02-27

## 2023-02-27 NOTE — CONSULTS
Diabetes Education    Patient Name:  Shelley Leach  YOB: 1955  MRN: [id number]  Admit Date:  (Not on file)        2hr DM education provided to address insulin pump therapy. Pt accompanied by her spouse, both were attentive and pt was able to teach back basic pump skills. Pt was engaged in discussion and asked appropriate questions. Encouraged pt to call with any questions. Will fup within one week via telephone. Thank you for the referral.      Electronically signed by:  Heather Dyer  02/27/23 13:06 EST

## 2023-03-02 ENCOUNTER — DOCUMENTATION (OUTPATIENT)
Dept: DIABETES SERVICES | Facility: HOSPITAL | Age: 68
End: 2023-03-02
Payer: MEDICARE

## 2023-03-02 NOTE — PLAN OF CARE
Phone follow up completed and goals addressed. Patient voices no hypo events since starting device, now focusing on attempting correction bolus when needed. Discussed only in 3 hour increments to prevent stacking of insulin. Encouraged to continue reviewing quick reference guide to support newly acquired skills.

## 2023-03-03 ENCOUNTER — LAB (OUTPATIENT)
Dept: FAMILY MEDICINE CLINIC | Facility: CLINIC | Age: 68
End: 2023-03-03
Payer: MEDICARE

## 2023-03-03 DIAGNOSIS — E55.9 VITAMIN D DEFICIENCY: ICD-10-CM

## 2023-03-03 DIAGNOSIS — N18.32 CHRONIC KIDNEY DISEASE, STAGE 3B: ICD-10-CM

## 2023-03-03 DIAGNOSIS — E78.2 HYPERLIPIDEMIA, MIXED: ICD-10-CM

## 2023-03-03 DIAGNOSIS — R15.9 INCONTINENCE OF FECES, UNSPECIFIED FECAL INCONTINENCE TYPE: ICD-10-CM

## 2023-03-03 DIAGNOSIS — Z79.4 TYPE 2 DIABETES MELLITUS WITH HYPERGLYCEMIA, WITH LONG-TERM CURRENT USE OF INSULIN: ICD-10-CM

## 2023-03-03 DIAGNOSIS — E11.65 TYPE 2 DIABETES MELLITUS WITH HYPERGLYCEMIA, WITH LONG-TERM CURRENT USE OF INSULIN: ICD-10-CM

## 2023-03-03 DIAGNOSIS — I10 HYPERTENSION, ESSENTIAL: ICD-10-CM

## 2023-03-03 DIAGNOSIS — I65.21 STENOSIS OF RIGHT CAROTID ARTERY: ICD-10-CM

## 2023-03-03 DIAGNOSIS — E03.9 ACQUIRED HYPOTHYROIDISM: ICD-10-CM

## 2023-03-03 DIAGNOSIS — E53.8 VITAMIN B12 DEFICIENCY: ICD-10-CM

## 2023-03-03 PROCEDURE — 36415 COLL VENOUS BLD VENIPUNCTURE: CPT | Performed by: FAMILY MEDICINE

## 2023-03-04 LAB
25(OH)D3+25(OH)D2 SERPL-MCNC: 61 NG/ML (ref 30–100)
ALBUMIN SERPL-MCNC: 3.6 G/DL (ref 3.8–4.8)
ALBUMIN/GLOB SERPL: 1.3 {RATIO} (ref 1.2–2.2)
ALP SERPL-CCNC: 115 IU/L (ref 44–121)
ALT SERPL-CCNC: 17 IU/L (ref 0–32)
AST SERPL-CCNC: 19 IU/L (ref 0–40)
BASOPHILS # BLD AUTO: 0.1 X10E3/UL (ref 0–0.2)
BASOPHILS NFR BLD AUTO: 1 %
BILIRUB SERPL-MCNC: 0.4 MG/DL (ref 0–1.2)
BUN SERPL-MCNC: 19 MG/DL (ref 8–27)
BUN/CREAT SERPL: 13 (ref 12–28)
CALCIUM SERPL-MCNC: 9.2 MG/DL (ref 8.7–10.3)
CHLORIDE SERPL-SCNC: 107 MMOL/L (ref 96–106)
CHOLEST SERPL-MCNC: 86 MG/DL (ref 100–199)
CO2 SERPL-SCNC: 24 MMOL/L (ref 20–29)
CREAT SERPL-MCNC: 1.48 MG/DL (ref 0.57–1)
EGFRCR SERPLBLD CKD-EPI 2021: 39 ML/MIN/1.73
EOSINOPHIL # BLD AUTO: 0.3 X10E3/UL (ref 0–0.4)
EOSINOPHIL NFR BLD AUTO: 4 %
ERYTHROCYTE [DISTWIDTH] IN BLOOD BY AUTOMATED COUNT: 12.3 % (ref 11.7–15.4)
GLOBULIN SER CALC-MCNC: 2.7 G/DL (ref 1.5–4.5)
GLUCOSE SERPL-MCNC: 127 MG/DL (ref 70–99)
HCT VFR BLD AUTO: 35.9 % (ref 34–46.6)
HDLC SERPL-MCNC: 42 MG/DL
HGB BLD-MCNC: 11.8 G/DL (ref 11.1–15.9)
IMM GRANULOCYTES # BLD AUTO: 0 X10E3/UL (ref 0–0.1)
IMM GRANULOCYTES NFR BLD AUTO: 0 %
LDLC SERPL CALC-MCNC: 26 MG/DL (ref 0–99)
LYMPHOCYTES # BLD AUTO: 3.1 X10E3/UL (ref 0.7–3.1)
LYMPHOCYTES NFR BLD AUTO: 44 %
MCH RBC QN AUTO: 31.1 PG (ref 26.6–33)
MCHC RBC AUTO-ENTMCNC: 32.9 G/DL (ref 31.5–35.7)
MCV RBC AUTO: 95 FL (ref 79–97)
MONOCYTES # BLD AUTO: 0.5 X10E3/UL (ref 0.1–0.9)
MONOCYTES NFR BLD AUTO: 8 %
NEUTROPHILS # BLD AUTO: 2.9 X10E3/UL (ref 1.4–7)
NEUTROPHILS NFR BLD AUTO: 43 %
PLATELET # BLD AUTO: 229 X10E3/UL (ref 150–450)
POTASSIUM SERPL-SCNC: 4.2 MMOL/L (ref 3.5–5.2)
PROT SERPL-MCNC: 6.3 G/DL (ref 6–8.5)
RBC # BLD AUTO: 3.79 X10E6/UL (ref 3.77–5.28)
SODIUM SERPL-SCNC: 144 MMOL/L (ref 134–144)
TRIGL SERPL-MCNC: 91 MG/DL (ref 0–149)
TSH SERPL DL<=0.005 MIU/L-ACNC: 4.26 UIU/ML (ref 0.45–4.5)
VIT B12 SERPL-MCNC: 696 PG/ML (ref 232–1245)
VLDLC SERPL CALC-MCNC: 18 MG/DL (ref 5–40)
WBC # BLD AUTO: 6.8 X10E3/UL (ref 3.4–10.8)

## 2023-03-06 DIAGNOSIS — G62.9 NEUROPATHY: ICD-10-CM

## 2023-03-06 RX ORDER — GABAPENTIN 300 MG/1
300 CAPSULE ORAL 3 TIMES DAILY
Qty: 90 CAPSULE | Refills: 1 | Status: SHIPPED | OUTPATIENT
Start: 2023-03-06 | End: 2023-03-24 | Stop reason: SDUPTHER

## 2023-03-06 NOTE — TELEPHONE ENCOUNTER
Rx Refill Note    Requested Prescriptions     Pending Prescriptions Disp Refills   • gabapentin (NEURONTIN) 300 MG capsule 90 capsule 1     Sig: Take 1 capsule by mouth 3 (Three) Times a Day.        Last office visit with prescribing clinician: 9/9/2022      Next office visit with prescribing clinician: 3/10/2023   Last labs:   Last refill:    Pharmacy [facility] pharmacy

## 2023-03-09 ENCOUNTER — TELEPHONE (OUTPATIENT)
Dept: ENDOCRINOLOGY | Facility: CLINIC | Age: 68
End: 2023-03-09
Payer: MEDICARE

## 2023-03-09 RX ORDER — PROCHLORPERAZINE 25 MG/1
1 SUPPOSITORY RECTAL
Qty: 1 EACH | Refills: 3 | Status: SHIPPED | OUTPATIENT
Start: 2023-03-09

## 2023-03-09 RX ORDER — PROCHLORPERAZINE 25 MG/1
SUPPOSITORY RECTAL
Qty: 9 EACH | Refills: 3 | Status: SHIPPED | OUTPATIENT
Start: 2023-03-09

## 2023-03-09 NOTE — TELEPHONE ENCOUNTER
PT CALLED STATING HER MEY ON HER PHONE IS TELLING HER SHE NEEDS TO CHANGE HER DEXCOM SENSOR. SHE REQUESTED WE SEND IN A RX TO [facility] IN [place], [place].

## 2023-03-24 DIAGNOSIS — G62.9 NEUROPATHY: ICD-10-CM

## 2023-03-24 RX ORDER — CARVEDILOL 6.25 MG/1
6.25 TABLET ORAL EVERY 12 HOURS
Qty: 180 TABLET | Refills: 1 | Status: SHIPPED | OUTPATIENT
Start: 2023-03-24

## 2023-03-24 RX ORDER — ATORVASTATIN CALCIUM 10 MG/1
10 TABLET, FILM COATED ORAL DAILY
Qty: 90 TABLET | Refills: 1 | Status: SHIPPED | OUTPATIENT
Start: 2023-03-24

## 2023-03-24 RX ORDER — GABAPENTIN 300 MG/1
300 CAPSULE ORAL 3 TIMES DAILY
Qty: 90 CAPSULE | Refills: 1 | Status: SHIPPED | OUTPATIENT
Start: 2023-03-24

## 2023-03-24 NOTE — TELEPHONE ENCOUNTER
[facility] Medication Refill Protocol criteria for refilling NovoLog Flex Pen 100 unit/mL pen passed.     Med last ordered by PCP (Y/N)?: yes    Followed up after starting/changing med, (n/a if long-term)?: n/a     Related encounter(s); diagnoses on Problem List, in office note: LOV (tele) 3/14/23, return not specified; DM2    NxOV: none     Action(s): Refilled per protocol      Please request with PCP

## 2023-03-29 RX ORDER — EZETIMIBE 10 MG/1
10 TABLET ORAL DAILY
Qty: 90 TABLET | Refills: 1 | Status: SHIPPED | OUTPATIENT
Start: 2023-03-29

## 2023-03-29 NOTE — TELEPHONE ENCOUNTER
Rx Refill Note    Requested Prescriptions     Pending Prescriptions Disp Refills   • ezetimibe (ZETIA) 10 MG tablet [Pharmacy Med Name: EZETIMIBE 10 MG TABS 10 Tablet] 90 tablet 1     Sig: TAKE 1 TABLET BY MOUTH DAILY.        Last office visit with prescribing clinician: 9/9/2022      Next office visit with prescribing clinician: 3/30/2023   Last labs:   Last refill:    Pharmacy [facility] pharmacy

## 2023-03-30 ENCOUNTER — OFFICE VISIT (OUTPATIENT)
Dept: FAMILY MEDICINE CLINIC | Facility: CLINIC | Age: 68
End: 2023-03-30
Payer: MEDICARE

## 2023-03-30 VITALS
DIASTOLIC BLOOD PRESSURE: 80 MMHG | HEART RATE: 62 BPM | BODY MASS INDEX: 31.65 KG/M2 | OXYGEN SATURATION: 98 % | SYSTOLIC BLOOD PRESSURE: 158 MMHG | HEIGHT: 59 IN | WEIGHT: 157 LBS

## 2023-03-30 DIAGNOSIS — E11.65 UNCONTROLLED TYPE 2 DIABETES MELLITUS WITH HYPERGLYCEMIA: ICD-10-CM

## 2023-03-30 DIAGNOSIS — F43.29 ADJUSTMENT DISORDER WITH OTHER SYMPTOM: ICD-10-CM

## 2023-03-30 DIAGNOSIS — I10 HYPERTENSION, ESSENTIAL: Primary | ICD-10-CM

## 2023-03-30 DIAGNOSIS — Z23 IMMUNIZATION DUE: ICD-10-CM

## 2023-03-30 DIAGNOSIS — E03.9 ACQUIRED HYPOTHYROIDISM: ICD-10-CM

## 2023-03-30 DIAGNOSIS — E55.9 VITAMIN D DEFICIENCY: ICD-10-CM

## 2023-03-30 DIAGNOSIS — E53.8 VITAMIN B12 DEFICIENCY: ICD-10-CM

## 2023-03-30 DIAGNOSIS — N18.32 CHRONIC KIDNEY DISEASE, STAGE 3B: ICD-10-CM

## 2023-03-30 DIAGNOSIS — R68.82 DECREASED LIBIDO: ICD-10-CM

## 2023-03-30 PROBLEM — F43.20 ADJUSTMENT DISORDER: Status: ACTIVE | Noted: 2023-03-30

## 2023-03-30 PROBLEM — T30.0 BURN: Status: ACTIVE | Noted: 2023-03-30

## 2023-03-30 RX ORDER — DEXAMETHASONE SODIUM PHOSPHATE 1 MG/ML
SOLUTION/ DROPS OPHTHALMIC
COMMUNITY
Start: 2023-03-24 | End: 2023-03-30

## 2023-03-30 RX ORDER — BUPROPION HYDROCHLORIDE 150 MG/1
150 TABLET ORAL DAILY
Qty: 90 TABLET | Refills: 1 | Status: SHIPPED | OUTPATIENT
Start: 2023-03-30

## 2023-03-30 RX ORDER — CEFUROXIME AXETIL 500 MG/1
1 TABLET ORAL EVERY 12 HOURS SCHEDULED
COMMUNITY
Start: 2023-03-24

## 2023-03-30 RX ADMIN — CYANOCOBALAMIN 1000 MCG: 1000 INJECTION, SOLUTION INTRAMUSCULAR; SUBCUTANEOUS at 10:15

## 2023-03-30 NOTE — PROGRESS NOTES
Patient Name: Shelley Leach  : 1955   MRN: [id number]     Chief Complaint:    Chief Complaint   Patient presents with   • Hypertension   • Med Refill       History of Present Illness: Shelley Leach is a 68 y.o. female who is here today for follow up on decreased libido and BP  HPI        Review of Systems:   Review of Systems   Constitutional: Negative.    HENT: Negative.    Eyes: Negative.    Respiratory: Negative.    Cardiovascular: Negative.    Gastrointestinal: Negative.    Neurological: Negative.         Past Medical History:   Past Medical History:   Diagnosis Date   • Bilateral carotid artery stenosis    • Clostridioides difficile infection     15 YEARS AGO, TREATED   • Dietary counseling     diet education-Abstracted from [facility]   • Dietary counseling and surveillance    • Diverticulosis    • Elevated cholesterol    • GERD (gastroesophageal reflux disease)    • Goiter    • History of thyroid disorder     Thyroid problem-Abstracted from [place]   • History of transfusion     NO REACTION   • Hypertension    • Hypothyroidism    • Leaky heart valve    • PONV (postoperative nausea and vomiting)    • Type 2 diabetes mellitus, uncontrolled    • Vitamin D deficiency        Past Surgical History:   Past Surgical History:   Procedure Laterality Date   • BLEPHAROPLASTY Bilateral    • CARDIAC CATHETERIZATION      NO INTERVENTION-20 YRS AGO   • CAROTID ARTERY ANGIOPLASTY N/A    • CAROTID ENDARTERECTOMY Right 2021    Procedure: CAROTID ENDARTERECTOMY WITH EEG RIGHT;  Surgeon: Javed Morris MD;  Location: [facility] OR;  Service: Vascular;  Laterality: Right;   • CARPAL TUNNEL RELEASE Bilateral    • CATARACT EXTRACTION     • CHOLECYSTECTOMY     • COLONOSCOPY     • GASTRIC BYPASS     • HYSTERECTOMY     • INTERVENTIONAL RADIOLOGY PROCEDURE Bilateral 2021    Procedure: Carotid Cerebral Angiogram;  Surgeon: Sukumar Francis MD;  Location:  [facility];  Service:  Interventional Radiology;  Laterality: Bilateral;   • OVARIAN CYST SURGERY      x 2   • ROTATOR CUFF REPAIR Left    • THYROIDECTOMY, PARTIAL     • TONSILLECTOMY         Family History:   Family History   Problem Relation Age of Onset   • Diabetes Mother    • Anemia Mother    • Cancer Mother    • Hypertension Mother    • Diabetes Father    • Hypertension Father    • Thyroid disease Father    • Kidney disease Father    • Cancer Father    • Obesity Father    • Kidney disease Maternal Grandfather    • Hypertension Maternal Grandmother    • Obesity Paternal Grandmother    • Cancer Maternal Aunt    • Cancer Paternal Aunt         Breast   • Diabetes Paternal Aunt    • Cancer Maternal Uncle         Bladder   • Diabetes Maternal Uncle    • Hypertension Maternal Uncle    • Kidney disease Maternal Uncle    • Obesity Maternal Uncle    • Cancer Maternal Aunt         Breast   • Diabetes Maternal Aunt    • Diabetes Brother    • Hypertension Brother    • Kidney disease Brother    • Obesity Brother    • Diabetes Maternal Uncle    • Diabetes Maternal Aunt        Social History:   Social History     Socioeconomic History   • Marital status:    • Number of children: 2   Tobacco Use   • Smoking status: Never   • Smokeless tobacco: Never   Vaping Use   • Vaping Use: Never used   Substance and Sexual Activity   • Alcohol use: Never   • Drug use: Never   • Sexual activity: Yes     Partners: Male     Birth control/protection: Hysterectomy       Medications:     Current Outpatient Medications:   •  aspirin 81 MG EC tablet, Take 1 tablet by mouth Daily., Disp: , Rfl:   •  atorvastatin (LIPITOR) 10 MG tablet, Take 1 tablet by mouth Daily., Disp: 90 tablet, Rfl: 1  •  Biotin 06047 MCG tablet dispersible, Place 10,000 mcg on the tongue 2 (two) times a day., Disp: , Rfl:   •  carvedilol (COREG) 6.25 MG tablet, Take 1 tablet by mouth Every 12 (Twelve) Hours., Disp: 180 tablet, Rfl: 1  •  cefuroxime (CEFTIN) 500 MG tablet, Take 1 tablet by  mouth Every 12 (Twelve) Hours., Disp: , Rfl:   •  cetirizine (zyrTEC) 10 MG tablet, TAKE 1 TABLET EVERY DAY, Disp: 90 tablet, Rfl: 1  •  clopidogrel (PLAVIX) 75 MG tablet, TAKE 1 TABLET EVERY DAY, Disp: 90 tablet, Rfl: 3  •  Continuous Blood Gluc Sensor (Dexcom G6 Sensor), Every 10 (Ten) Days., Disp: 9 each, Rfl: 3  •  Continuous Blood Gluc Transmit (Dexcom G6 Transmitter) misc, 1 each Every 3 (Three) Months., Disp: 1 each, Rfl: 3  •  Cyanocobalamin (B-12 Compliance Injection) 1000 MCG/ML kit, Inject  as directed., Disp: , Rfl:   •  estradiol (ESTRACE) 0.1 MG/GM vaginal cream, INSERT 1G VAGINALY TWICE A WEEK AT BEDTIME, Disp: , Rfl:   •  Evolocumab (Repatha) solution prefilled syringe injection, Inject 1 mL under the skin into the appropriate area as directed Every 14 (Fourteen) Days., Disp: 6 mL, Rfl: 5  •  ezetimibe (ZETIA) 10 MG tablet, TAKE 1 TABLET BY MOUTH DAILY., Disp: 90 tablet, Rfl: 1  •  famotidine (PEPCID) 40 MG tablet, , Disp: , Rfl:   •  fenofibrate (TRICOR) 145 MG tablet, Take 1 tablet by mouth Daily., Disp: , Rfl:   •  ferrous sulfate 300 (60 Fe) MG/5ML syrup, Take 5 mL by mouth Daily With Breakfast., Disp: , Rfl:   •  gabapentin (NEURONTIN) 300 MG capsule, Take 1 capsule by mouth 3 (Three) Times a Day., Disp: 90 capsule, Rfl: 1  •  glucose blood test strip, Daily., Disp: , Rfl:   •  Insulin Aspart (novoLOG) 100 UNIT/ML injection, 100 units per day via pump, Disp: 30 mL, Rfl: 12  •  Insulin Disposable Pump (Omnipod 5 G6 Pod, Gen 5,) misc, 1 each Every 72 (Seventy-Two) Hours., Disp: 10 each, Rfl: 11  •  levothyroxine (SYNTHROID, LEVOTHROID) 100 MCG tablet, TAKE 1 TABLET EVERY DAY, Disp: 90 tablet, Rfl: 1  •  LORazepam (ATIVAN) 0.5 MG tablet, Take 1 tablet by mouth Every 8 (Eight) Hours As Needed for Anxiety., Disp: 30 tablet, Rfl: 0  •  losartan (COZAAR) 50 MG tablet, Take 1 tablet by mouth Daily., Disp: 90 tablet, Rfl: 1  •  Multiple Vitamins-Minerals (BARIATRIC FUSION PO), Take 1 tablet by mouth  "Daily. Hair skin and nails, Disp: , Rfl:   •  Myrbetriq 25 MG tablet sustained-release 24 hour 24 hr tablet, TAKE 1 TABLET DAILY,       SWALLOWING WHOLE WITH      WATER. DO NOT CRUSH, CHEW, AND/OR DIVIDE, Disp: 90 tablet, Rfl: 0  •  Probiotic Product (TRUBIOTICS PO), Take 1 capsule by mouth Daily., Disp: , Rfl:   •  sertraline (ZOLOFT) 50 MG tablet, Take 1 tablet by mouth Daily., Disp: 90 tablet, Rfl: 1  •  sucralfate (CARAFATE) 1 g tablet, Take 1 tablet by mouth 4 (Four) Times a Day., Disp: , Rfl:   •  vitamin D3 125 MCG (5000 UT) capsule capsule, Take 2 capsules by mouth Daily., Disp: , Rfl:   •  fluconazole (DIFLUCAN) 150 MG tablet, Take 1 tablet by mouth 1 (One) Time As Needed (YEAST). As needed, Disp: , Rfl:   •  multivitamin (THERAGRAN) tablet tablet, take 1 tablet by oral route  every day with food, Disp: , Rfl:   •  vitamin C (ASCORBIC ACID) 250 MG tablet, Take 250 mg by mouth Daily., Disp: , Rfl:     Current Facility-Administered Medications:   •  cyanocobalamin injection 1,000 mcg, 1,000 mcg, Intramuscular, Q28 Days, Tom Salas MD, 1,000 mcg at 03/30/23 1015    Facility-Administered Medications Ordered in Other Visits:   •  Chlorhexidine Gluconate Cloth 2 % pads 1 application, 1 application, Topical, Q12H PRN, Sarah Wan PA-C  •  Chlorhexidine Gluconate Cloth 2 % pads 1 application, 1 application, Topical, Q12H PRN, Jeffery Sumner PA    Allergies:   Allergies   Allergen Reactions   • Percocet [Oxycodone-Acetaminophen] Itching   • Morphine Other (See Comments)     HYPOTENSION   • Sulfamethoxazole-Trimethoprim Swelling   • Penicillins Rash         Physical Exam:  Vital Signs:   Vitals:    03/30/23 1004   BP: 158/80   BP Location: Right arm   Patient Position: Sitting   Cuff Size: Large Adult   Pulse: 62   SpO2: 98%   Weight: 71.2 kg (157 lb)   Height: 149.9 cm (59\")   PainSc: 0-No pain     Body mass index is 31.71 kg/m².     Physical Exam  Vitals and nursing note reviewed.   Constitutional:  "      Appearance: Normal appearance. She is normal weight.   HENT:      Head: Normocephalic and atraumatic.      Right Ear: Tympanic membrane, ear canal and external ear normal.      Left Ear: Tympanic membrane, ear canal and external ear normal.      Nose: Nose normal.      Mouth/Throat:      Mouth: Mucous membranes are dry.      Pharynx: Oropharynx is clear.   Eyes:      Extraocular Movements: Extraocular movements intact.      Conjunctiva/sclera: Conjunctivae normal.      Pupils: Pupils are equal, round, and reactive to light.   Cardiovascular:      Rate and Rhythm: Normal rate and regular rhythm.      Pulses: Normal pulses.      Heart sounds: Normal heart sounds.   Pulmonary:      Effort: Pulmonary effort is normal.      Breath sounds: Normal breath sounds.   Musculoskeletal:      Cervical back: Normal range of motion and neck supple.   Feet:      Comments:      Neurological:      Mental Status: She is alert.         Procedures      Assessment/Plan:   Diagnoses and all orders for this visit:    1. Hypertension, essential (Primary)  Assessment & Plan:  Discussed with patient to monitor their blood pressure and if systolic blood pressure goes above 140 or diastolic is above 90 to return to clinic.  Take medicines as directed, call for any problems, patient not having or any worrisome symptoms.        Patient's blood pressure has been fluctuating.  She is in a make appointment with Dr. Cooley      2. Vitamin B12 deficiency  Assessment & Plan:  Recheck in 6-month      3. Vitamin D deficiency  Assessment & Plan:  Recheck in 6-month      4. Uncontrolled type 2 diabetes mellitus with hyperglycemia (HCC)  Assessment & Plan:  Followed by Dr. Talbot.      5. Acquired hypothyroidism  Assessment & Plan:  TSH is 4.290.  We will follow.      6. Chronic kidney disease, stage 3b (HCC)  Assessment & Plan:  Continue follow-up with nephrologist      7. Decreased libido  Assessment & Plan:  Patient had problem with decreased  libido.  We are going to stop her Effexor and start her on some Wellbutrin.  She will be weaned off her Effexor and started on 150 mg Wellbutrin.  Hopefully this will help her decrease libido and treat her adjustment disorder.  Recheck in 6 weeks.      8. Adjustment disorder with other symptom  Assessment & Plan:  Start Wellbutrin.             Follow Up:   Return in about 6 weeks (around 5/11/2023) for Annual physical.    Tom Salas MD  [facility]     Answers for HPI/ROS submitted by the patient on 3/24/2023  Please describe your symptoms.: Check up  Have you had these symptoms before?: No  How long have you been having these symptoms?: 1-4 days  What is the primary reason for your visit?: Other

## 2023-03-30 NOTE — ASSESSMENT & PLAN NOTE
Discussed with patient to monitor their blood pressure and if systolic blood pressure goes above 140 or diastolic is above 90 to return to clinic.  Take medicines as directed, call for any problems, patient not having or any worrisome symptoms.        Patient's blood pressure has been fluctuating.  She is in a make appointment with Dr. Cooley

## 2023-03-30 NOTE — ASSESSMENT & PLAN NOTE
Patient had problem with decreased libido.  We are going to stop her Effexor and start her on some Wellbutrin.  She will be weaned off her Effexor and started on 150 mg Wellbutrin.  Hopefully this will help her decrease libido and treat her adjustment disorder.  Recheck in 6 weeks.

## 2023-04-05 DIAGNOSIS — G62.9 NEUROPATHY: ICD-10-CM

## 2023-04-05 RX ORDER — GABAPENTIN 300 MG/1
300 CAPSULE ORAL 3 TIMES DAILY
Qty: 90 CAPSULE | Refills: 1 | OUTPATIENT
Start: 2023-04-05

## 2023-04-21 ENCOUNTER — OFFICE VISIT (OUTPATIENT)
Dept: FAMILY MEDICINE CLINIC | Facility: CLINIC | Age: 68
End: 2023-04-21
Payer: MEDICARE

## 2023-04-21 VITALS
DIASTOLIC BLOOD PRESSURE: 72 MMHG | WEIGHT: 157 LBS | TEMPERATURE: 97.8 F | RESPIRATION RATE: 14 BRPM | BODY MASS INDEX: 31.65 KG/M2 | HEART RATE: 65 BPM | SYSTOLIC BLOOD PRESSURE: 120 MMHG | OXYGEN SATURATION: 98 % | HEIGHT: 59 IN

## 2023-04-21 DIAGNOSIS — E03.9 ACQUIRED HYPOTHYROIDISM: ICD-10-CM

## 2023-04-21 DIAGNOSIS — I10 HYPERTENSION, ESSENTIAL: ICD-10-CM

## 2023-04-21 DIAGNOSIS — E53.8 VITAMIN B12 DEFICIENCY: ICD-10-CM

## 2023-04-21 DIAGNOSIS — R21 SKIN RASH: ICD-10-CM

## 2023-04-21 DIAGNOSIS — Z00.00 MEDICARE ANNUAL WELLNESS VISIT, SUBSEQUENT: Primary | ICD-10-CM

## 2023-04-21 DIAGNOSIS — E55.9 VITAMIN D DEFICIENCY: ICD-10-CM

## 2023-04-21 DIAGNOSIS — N18.32 CHRONIC KIDNEY DISEASE, STAGE 3B: ICD-10-CM

## 2023-04-21 RX ORDER — FAMOTIDINE 40 MG/1
40 TABLET, FILM COATED ORAL
COMMUNITY
Start: 2023-04-13 | End: 2023-04-21 | Stop reason: SDUPTHER

## 2023-04-21 RX ORDER — VENLAFAXINE 37.5 MG/1
TABLET ORAL
COMMUNITY
Start: 2023-04-14

## 2023-04-21 NOTE — ASSESSMENT & PLAN NOTE
Select arthropod bite.  Could be shingles.  We just can watch it.  If it breaks in a rash I told her to call me and we will call her in some medicine for it.

## 2023-04-21 NOTE — ASSESSMENT & PLAN NOTE
Patient sees nephrology.  Patient is instructed to not take any NSAIDs.  Medicines as directed.  Stay well-hydrated.

## 2023-04-21 NOTE — PROGRESS NOTES
The ABCs of the Annual Wellness Visit  Subsequent Medicare Wellness Visit    Chief Complaint   Patient presents with   • Medicare Wellness-subsequent     Pt here for medicare wellness exam   • Insect Bite     Pt c/o insect bites on her face, she wants looked at      Subjective    History of Present Illness:  Shelley Leach is a 68 y.o. female who presents for a Subsequent Medicare Wellness Visit.    The following portions of the patient's history were reviewed and   updated as appropriate: allergies, current medications, past family history, past medical history, past social history, past surgical history and problem list.    Compared to one year ago, the patient feels her physical   health is the same.    Compared to one year ago, the patient feels her mental   health is the same.    Recent Hospitalizations:  She was not admitted to the hospital during the last year.       Current Medical Providers:  Patient Care Team:  Tom Salas MD as PCP - General  Tom Salas MD as PCP - Family Medicine  Javed Morris MD as Referring Physician (Cardiothoracic Surgery)  Sonu Talbot MD as Consulting Physician (Endocrinology)  Brice Cooley MD as Cardiologist (Internal Medicine)  Kizzy Magallon RN as Ambulatory  ([facility])    Outpatient Medications Prior to Visit   Medication Sig Dispense Refill   • aspirin 81 MG EC tablet Take 1 tablet by mouth Daily.     • atorvastatin (LIPITOR) 10 MG tablet Take 1 tablet by mouth Daily. 90 tablet 1   • Biotin 28590 MCG tablet dispersible Place 10,000 mcg on the tongue 2 (two) times a day.     • buPROPion XL (Wellbutrin XL) 150 MG 24 hr tablet Take 1 tablet by mouth Daily. 90 tablet 1   • carvedilol (COREG) 6.25 MG tablet Take 1 tablet by mouth Every 12 (Twelve) Hours. 180 tablet 1   • cefuroxime (CEFTIN) 500 MG tablet Take 1 tablet by mouth Every 12 (Twelve) Hours.     • cetirizine (zyrTEC) 10 MG tablet TAKE 1 TABLET EVERY DAY 90  tablet 1   • clopidogrel (PLAVIX) 75 MG tablet TAKE 1 TABLET EVERY DAY 90 tablet 3   • Continuous Blood Gluc Sensor (Dexcom G6 Sensor) Every 10 (Ten) Days. 9 each 3   • Continuous Blood Gluc Transmit (Dexcom G6 Transmitter) misc 1 each Every 3 (Three) Months. 1 each 3   • Cyanocobalamin (B-12 Compliance Injection) 1000 MCG/ML kit Inject  as directed.     • estradiol (ESTRACE) 0.1 MG/GM vaginal cream INSERT 1G VAGINALY TWICE A WEEK AT BEDTIME     • Evolocumab (Repatha) solution prefilled syringe injection Inject 1 mL under the skin into the appropriate area as directed Every 14 (Fourteen) Days. 6 mL 5   • ezetimibe (ZETIA) 10 MG tablet TAKE 1 TABLET BY MOUTH DAILY. 90 tablet 1   • famotidine (PEPCID) 40 MG tablet      • fenofibrate (TRICOR) 145 MG tablet Take 1 tablet by mouth Daily.     • ferrous sulfate 300 (60 Fe) MG/5ML syrup Take 5 mL by mouth Daily With Breakfast.     • fluconazole (DIFLUCAN) 150 MG tablet Take 1 tablet by mouth 1 (One) Time As Needed (YEAST). As needed     • gabapentin (NEURONTIN) 300 MG capsule Take 1 capsule by mouth 3 (Three) Times a Day. 90 capsule 1   • glucose blood test strip Daily.     • Insulin Aspart (novoLOG) 100 UNIT/ML injection 100 units per day via pump 30 mL 12   • Insulin Disposable Pump (Omnipod 5 G6 Pod, Gen 5,) misc 1 each Every 72 (Seventy-Two) Hours. 10 each 11   • levothyroxine (SYNTHROID, LEVOTHROID) 100 MCG tablet TAKE 1 TABLET EVERY DAY 90 tablet 1   • LORazepam (ATIVAN) 0.5 MG tablet Take 1 tablet by mouth Every 8 (Eight) Hours As Needed for Anxiety. 30 tablet 0   • losartan (COZAAR) 50 MG tablet Take 1 tablet by mouth Daily. 90 tablet 1   • Multiple Vitamins-Minerals (BARIATRIC FUSION PO) Take 1 tablet by mouth Daily. Hair skin and nails     • multivitamin (THERAGRAN) tablet tablet take 1 tablet by oral route  every day with food     • Myrbetriq 25 MG tablet sustained-release 24 hour 24 hr tablet TAKE 1 TABLET DAILY,       SWALLOWING WHOLE WITH      WATER. DO NOT  CRUSH, CHEW, AND/OR DIVIDE 90 tablet 0   • Probiotic Product (TRUBIOTICS PO) Take 1 capsule by mouth Daily.     • sertraline (ZOLOFT) 50 MG tablet Take 1 tablet by mouth Daily. 90 tablet 1   • sucralfate (CARAFATE) 1 g tablet Take 1 tablet by mouth 4 (Four) Times a Day.     • venlafaxine (EFFEXOR) 37.5 MG tablet      • vitamin D3 125 MCG (5000 UT) capsule capsule Take 2 capsules by mouth Daily.     • famotidine (PEPCID) 40 MG tablet Take 1 tablet by mouth.     • vitamin C (ASCORBIC ACID) 250 MG tablet Take 250 mg by mouth Daily.       Facility-Administered Medications Prior to Visit   Medication Dose Route Frequency Provider Last Rate Last Admin   • Chlorhexidine Gluconate Cloth 2 % pads 1 application  1 application Topical Q12H PRN Sarah Wan PA-C       • Chlorhexidine Gluconate Cloth 2 % pads 1 application  1 application Topical Q12H PRN Jeffery Sumner PA       • cyanocobalamin injection 1,000 mcg  1,000 mcg Intramuscular Q28 Days Tom Salas MD   1,000 mcg at 03/30/23 1015       No opioid medication identified on active medication list. I have reviewed chart for other potential  high risk medication/s and harmful drug interactions in the elderly.          Aspirin is on active medication list. Aspirin use is indicated based on review of current medical condition/s. Pros and cons of this therapy have been discussed today. Benefits of this medication outweigh potential harm.  Patient has been encouraged to continue taking this medication.  .      Patient Active Problem List   Diagnosis   • Acquired hypothyroidism   • Hypercholesteremia   • Hypoglycemia associated with type 2 diabetes mellitus   • Uncontrolled type 2 diabetes mellitus with hyperglycemia   • Vitamin D deficiency   • Stenosis of right carotid artery s/p RIGHT CEA 2021   • Osteoarthritis   • Gastroesophageal reflux disease   • CKD stage 3 due to type 2 diabetes mellitus   • Hypertension, essential   • Vitamin B12 deficiency   • Chronic  "kidney disease, stage 3b   • Encounter for long-term (current) use of other medications   • Hyperlipidemia, mixed   • Incontinence of feces   • Left carotid artery occlusion   • Decreased libido   • Adjustment disorder   • Burn   • Skin rash     Advance Care Planning  Advance Directive is not on file.  ACP discussion was held with the patient during this visit. Patient does not have an advance directive, information provided.    Review of Systems   Constitutional: Negative.    HENT: Negative.    Eyes: Negative.    Respiratory: Negative.    Cardiovascular: Negative.         Objective    Vitals:    04/21/23 1014   BP: 120/72   BP Location: Left arm   Patient Position: Sitting   Cuff Size: Adult   Pulse: 65   Resp: 14   Temp: 97.8 °F (36.6 °C)   TempSrc: Temporal   SpO2: 98%   Weight: 71.2 kg (157 lb)   Height: 149.9 cm (59\")   PainSc: 0-No pain     Estimated body mass index is 31.71 kg/m² as calculated from the following:    Height as of this encounter: 149.9 cm (59\").    Weight as of this encounter: 71.2 kg (157 lb).    BMI is >= 30 and <35. (Class 1 Obesity). The following options were offered after discussion;: exercise counseling/recommendations and nutrition counseling/recommendations      Does the patient have evidence of cognitive impairment? No    Physical Exam  Vitals and nursing note reviewed.   Constitutional:       Appearance: Normal appearance. She is normal weight.   HENT:      Head: Normocephalic and atraumatic.      Right Ear: Tympanic membrane, ear canal and external ear normal.      Left Ear: Tympanic membrane, ear canal and external ear normal.      Nose: Nose normal.      Mouth/Throat:      Mouth: Mucous membranes are dry.      Pharynx: Oropharynx is clear.   Eyes:      Extraocular Movements: Extraocular movements intact.      Conjunctiva/sclera: Conjunctivae normal.      Pupils: Pupils are equal, round, and reactive to light.   Cardiovascular:      Rate and Rhythm: Normal rate and regular rhythm. "      Pulses: Normal pulses.      Heart sounds: Normal heart sounds.   Pulmonary:      Effort: Pulmonary effort is normal.      Breath sounds: Normal breath sounds.   Musculoskeletal:      Cervical back: Normal range of motion and neck supple.   Feet:      Comments:      Neurological:      Mental Status: She is alert.       Lab Results   Component Value Date    CHLPL 86 (L) 2023    TRIG 91 2023    HDL 42 2023    LDL 26 2023    VLDL 18 2023            HEALTH RISK ASSESSMENT    Smoking Status:  Social History     Tobacco Use   Smoking Status Never   Smokeless Tobacco Never     Alcohol Consumption:  Social History     Substance and Sexual Activity   Alcohol Use Never     Fall Risk Screen:    STEADI Fall Risk Assessment was completed, and patient is at MODERATE risk for falls. Assessment completed on:3/30/2023    Depression Screening:      3/30/2023    10:11 AM   PHQ-2/PHQ-9 Depression Screening   Little Interest or Pleasure in Doing Things 0-->not at all   Feeling Down, Depressed or Hopeless 0-->not at all   PHQ-9: Brief Depression Severity Measure Score 0       Health Habits and Functional and Cognitive Screenin/21/2023    10:19 AM   Functional & Cognitive Status   Do you have difficulty preparing food and eating? No   Do you have difficulty bathing yourself, getting dressed or grooming yourself? No   Do you have difficulty using the toilet? No   Do you have difficulty moving around from place to place? No   Do you have trouble with steps or getting out of a bed or a chair? No   Current Diet Well Balanced Diet   Dental Exam Up to date   Eye Exam Up to date   Exercise (times per week) 0 times per week   Current Exercises Include No Regular Exercise   Do you need help using the phone?  No   Are you deaf or do you have serious difficulty hearing?  No   Do you need help with transportation? No   Do you need help shopping? No   Do you need help preparing meals?  No   Do you need help  with housework?  No   Do you need help with laundry? No   Do you need help taking your medications? No   Do you need help managing money? No   Do you ever drive or ride in a car without wearing a seat belt? No   Have you felt unusual stress, anger or loneliness in the last month? No   Who do you live with? Spouse   If you need help, do you have trouble finding someone available to you? No   Have you been bothered in the last four weeks by sexual problems? Yes   Do you have difficulty concentrating, remembering or making decisions? No       Age-appropriate Screening Schedule:  Refer to the list below for future screening recommendations based on patient's age, sex and/or medical conditions. Orders for these recommended tests are listed in the plan section. The patient has been provided with a written plan.    Health Maintenance   Topic Date Due   • ZOSTER VACCINE (1 of 2) Never done   • DIABETIC EYE EXAM  04/28/2023   • URINE MICROALBUMIN  05/27/2023   • DIABETIC FOOT EXAM  07/19/2023   • HEMOGLOBIN A1C  07/20/2023   • INFLUENZA VACCINE  08/01/2023   • MAMMOGRAM  02/28/2024   • LIPID PANEL  03/03/2024   • ANNUAL WELLNESS VISIT  04/21/2024   • DXA SCAN  09/28/2024   • PAP SMEAR  01/04/2025   • COLORECTAL CANCER SCREENING  02/22/2032   • TDAP/TD VACCINES (3 - Td or Tdap) 03/30/2033   • HEPATITIS C SCREENING  Completed   • COVID-19 Vaccine  Completed   • Pneumococcal Vaccine 65+  Completed              Assessment & Plan    Problem List Items Addressed This Visit        Cardiac and Vasculature    Hypertension, essential    Current Assessment & Plan     Discussed with patient to monitor their blood pressure and if systolic blood pressure goes above 140 or diastolic is above 90 to return to clinic.  Take medicines as directed, call for any problems, patient not having or any worrisome symptoms.             Relevant Orders    Vitamin B12    Vitamin D,25-Hydroxy    Lipid Panel    Comprehensive Metabolic Panel    TSH Rfx On  Abnormal To Free T4    CBC & Differential       Endocrine and Metabolic    Acquired hypothyroidism    Current Assessment & Plan     Blood work in 6 months         Relevant Orders    Vitamin B12    Vitamin D,25-Hydroxy    Lipid Panel    Comprehensive Metabolic Panel    TSH Rfx On Abnormal To Free T4    CBC & Differential    Vitamin D deficiency    Current Assessment & Plan     Blood work in 6 months         Relevant Orders    Vitamin B12    Vitamin D,25-Hydroxy    Lipid Panel    Comprehensive Metabolic Panel    TSH Rfx On Abnormal To Free T4    CBC & Differential    Vitamin B12 deficiency    Current Assessment & Plan     Blood work in 6 months         Relevant Orders    Vitamin B12    Vitamin D,25-Hydroxy    Lipid Panel    Comprehensive Metabolic Panel    TSH Rfx On Abnormal To Free T4    CBC & Differential       Skin    Skin rash    Current Assessment & Plan     Select arthropod bite.  Could be shingles.  We just can watch it.  If it breaks in a rash I told her to call me and we will call her in some medicine for it.         Relevant Orders    Vitamin B12    Vitamin D,25-Hydroxy    Lipid Panel    Comprehensive Metabolic Panel    TSH Rfx On Abnormal To Free T4    CBC & Differential       Other    Chronic kidney disease, stage 3b    Current Assessment & Plan     Patient sees nephrology.  Patient is instructed to not take any NSAIDs.  Medicines as directed.  Stay well-hydrated.           Relevant Orders    Vitamin B12    Vitamin D,25-Hydroxy    Lipid Panel    Comprehensive Metabolic Panel    TSH Rfx On Abnormal To Free T4    CBC & Differential   Other Visit Diagnoses     Medicare annual wellness visit, subsequent    -  Primary    Relevant Orders    Vitamin B12    Vitamin D,25-Hydroxy    Lipid Panel    Comprehensive Metabolic Panel    TSH Rfx On Abnormal To Free T4    CBC & Differential        CMS Preventative Services Quick Reference  Risk Factors Identified During Encounter  None Identified  The above risks/problems  have been discussed with the patient.  Follow up actions/plans if indicated are seen below in the Assessment/Plan Section.  Pertinent information has been shared with the patient in the After Visit Summary.    Diagnoses and all orders for this visit:    1. Medicare annual wellness visit, subsequent (Primary)  -     Vitamin B12; Future  -     Vitamin D,25-Hydroxy; Future  -     Lipid Panel; Future  -     Comprehensive Metabolic Panel; Future  -     TSH Rfx On Abnormal To Free T4; Future  -     CBC & Differential; Future    2. Chronic kidney disease, stage 3b  Assessment & Plan:  Patient sees nephrology.  Patient is instructed to not take any NSAIDs.  Medicines as directed.  Stay well-hydrated.      Orders:  -     Vitamin B12; Future  -     Vitamin D,25-Hydroxy; Future  -     Lipid Panel; Future  -     Comprehensive Metabolic Panel; Future  -     TSH Rfx On Abnormal To Free T4; Future  -     CBC & Differential; Future    3. Hypertension, essential  Assessment & Plan:  Discussed with patient to monitor their blood pressure and if systolic blood pressure goes above 140 or diastolic is above 90 to return to clinic.  Take medicines as directed, call for any problems, patient not having or any worrisome symptoms.        Orders:  -     Vitamin B12; Future  -     Vitamin D,25-Hydroxy; Future  -     Lipid Panel; Future  -     Comprehensive Metabolic Panel; Future  -     TSH Rfx On Abnormal To Free T4; Future  -     CBC & Differential; Future    4. Vitamin D deficiency  Assessment & Plan:  Blood work in 6 months    Orders:  -     Vitamin B12; Future  -     Vitamin D,25-Hydroxy; Future  -     Lipid Panel; Future  -     Comprehensive Metabolic Panel; Future  -     TSH Rfx On Abnormal To Free T4; Future  -     CBC & Differential; Future    5. Vitamin B12 deficiency  Assessment & Plan:  Blood work in 6 months    Orders:  -     Vitamin B12; Future  -     Vitamin D,25-Hydroxy; Future  -     Lipid Panel; Future  -     Comprehensive  Metabolic Panel; Future  -     TSH Rfx On Abnormal To Free T4; Future  -     CBC & Differential; Future    6. Acquired hypothyroidism  Assessment & Plan:  Blood work in 6 months    Orders:  -     Vitamin B12; Future  -     Vitamin D,25-Hydroxy; Future  -     Lipid Panel; Future  -     Comprehensive Metabolic Panel; Future  -     TSH Rfx On Abnormal To Free T4; Future  -     CBC & Differential; Future    7. Skin rash  Assessment & Plan:  Select arthropod bite.  Could be shingles.  We just can watch it.  If it breaks in a rash I told her to call me and we will call her in some medicine for it.    Orders:  -     Vitamin B12; Future  -     Vitamin D,25-Hydroxy; Future  -     Lipid Panel; Future  -     Comprehensive Metabolic Panel; Future  -     TSH Rfx On Abnormal To Free T4; Future  -     CBC & Differential; Future      Follow Up:   Return in about 6 months (around 10/21/2023) for Bloodwork 1 week prior to next appointment, Annual physical.     An After Visit Summary and PPPS were made available to the patient.          I spent 15 minutes caring for Alberta on this date of service. This time includes time spent by me in the following activities:preparing for the visit, reviewing tests, obtaining and/or reviewing a separately obtained history, performing a medically appropriate examination and/or evaluation , counseling and educating the patient/family/caregiver, ordering medications, tests, or procedures, referring and communicating with other health care professionals , documenting information in the medical record, independently interpreting results and communicating that information with the patient/family/caregiver and care coordination

## 2023-04-28 RX ORDER — INSULIN ASPART 100 [IU]/ML
INJECTION, SOLUTION INTRAVENOUS; SUBCUTANEOUS
Qty: 90 ML | Refills: 3 | Status: SHIPPED | OUTPATIENT
Start: 2023-04-28

## 2023-04-28 RX ORDER — INSULIN PMP CART,AUT,G6/7,CNTR
1 EACH SUBCUTANEOUS
Qty: 30 EACH | Refills: 3 | Status: SHIPPED | OUTPATIENT
Start: 2023-04-28

## 2023-04-28 NOTE — TELEPHONE ENCOUNTER
PATIENT IS REQUESTING TO HAVE NEW RX FOR OMNIPOD 5 AND INSULIN ASPART SENT TO [facility] 90 DAYS DUE TO COST SAVINGS.

## 2023-05-01 ENCOUNTER — TELEPHONE (OUTPATIENT)
Dept: CASE MANAGEMENT | Facility: OTHER | Age: 68
End: 2023-05-01
Payer: MEDICARE

## 2023-05-01 ENCOUNTER — PATIENT OUTREACH (OUTPATIENT)
Dept: CASE MANAGEMENT | Facility: OTHER | Age: 68
End: 2023-05-01
Payer: MEDICARE

## 2023-05-01 DIAGNOSIS — E78.2 HYPERLIPIDEMIA, MIXED: ICD-10-CM

## 2023-05-01 DIAGNOSIS — E11.65 UNCONTROLLED TYPE 2 DIABETES MELLITUS WITH HYPERGLYCEMIA: ICD-10-CM

## 2023-05-01 DIAGNOSIS — N18.32 CHRONIC KIDNEY DISEASE, STAGE 3B: Primary | ICD-10-CM

## 2023-05-01 NOTE — OUTREACH NOTE
AMBULATORY CASE MANAGEMENT NOTE    Name and Relationship of Patient/Support Person: Shelley Leach - Self    CCM Interim Update  Spoke with patient and explained CCM services. Verbal consent given. Patient's current A1C is 8.6. She does have a dexcom. She states that she does have highs and lows but mostly high. Lately when she eats her blood sugar goes high.  Asked patient to keep a diary of highs and lows and trends that she notices. Patient could also benefit from Gvoke for lows if insurance will approve.     Patient educated to keep bp log to discuss on calls.      She was seen recently for a rash.  She states there is some residual but it is much improved.    She is scheduled 7/6/23 to have trigger finger surgery unless they have a cancellation and can move surgery up.         Education Documentation  No documentation found.        Kizzy SUAREZ  Ambulatory Case Management    5/1/2023, 13:33 EDT

## 2023-05-01 NOTE — TELEPHONE ENCOUNTER
HUB RELAYED MESSAGE TO PATIENT. PATIENT VERBALIZED UNDERSTANDING.    HUB WARM TRANSFERRED TO OFFICE.

## 2023-05-02 DIAGNOSIS — E11.65 TYPE 2 DIABETES MELLITUS WITH HYPERGLYCEMIA, WITH LONG-TERM CURRENT USE OF INSULIN: Primary | ICD-10-CM

## 2023-05-02 DIAGNOSIS — Z79.4 TYPE 2 DIABETES MELLITUS WITH HYPERGLYCEMIA, WITH LONG-TERM CURRENT USE OF INSULIN: Primary | ICD-10-CM

## 2023-05-02 RX ORDER — EZETIMIBE 10 MG/1
TABLET ORAL
Qty: 90 TABLET | Refills: 1 | Status: SHIPPED | OUTPATIENT
Start: 2023-05-02

## 2023-05-02 RX ORDER — DIPHENHYDRAMINE HCL 25 MG
1 TABLET ORAL
Qty: 1 KIT | Refills: 0 | Status: SHIPPED | OUTPATIENT
Start: 2023-05-02

## 2023-05-02 RX ORDER — ATORVASTATIN CALCIUM 10 MG/1
TABLET, FILM COATED ORAL
Qty: 90 TABLET | Refills: 1 | Status: SHIPPED | OUTPATIENT
Start: 2023-05-02

## 2023-05-02 RX ORDER — DIPHENHYDRAMINE HCL 25 MG
TABLET ORAL
COMMUNITY
End: 2023-05-02 | Stop reason: SDUPTHER

## 2023-05-02 NOTE — TELEPHONE ENCOUNTER
Rx Refill Note    Requested Prescriptions     Pending Prescriptions Disp Refills   • ezetimibe (ZETIA) 10 MG tablet [Pharmacy Med Name: EZETIMIBE 10 MG Tablet] 90 tablet 1     Sig: TAKE 1 TABLET EVERY DAY   • atorvastatin (LIPITOR) 10 MG tablet [Pharmacy Med Name: ATORVASTATIN CALCIUM 10 MG Tablet] 90 tablet 1     Sig: TAKE 1 TABLET EVERY NIGHT        Last office visit with prescribing clinician: 4/21/2023      Next office visit with prescribing clinician: 5/11/2023   Last labs:  Last refill:    Pharmacy [facility]

## 2023-05-11 ENCOUNTER — OFFICE VISIT (OUTPATIENT)
Dept: FAMILY MEDICINE CLINIC | Facility: CLINIC | Age: 68
End: 2023-05-11
Payer: MEDICARE

## 2023-05-11 VITALS
BODY MASS INDEX: 31.65 KG/M2 | DIASTOLIC BLOOD PRESSURE: 68 MMHG | HEART RATE: 68 BPM | WEIGHT: 157 LBS | OXYGEN SATURATION: 98 % | TEMPERATURE: 97.6 F | RESPIRATION RATE: 14 BRPM | SYSTOLIC BLOOD PRESSURE: 138 MMHG | HEIGHT: 59 IN

## 2023-05-11 DIAGNOSIS — F41.9 ANXIETY: ICD-10-CM

## 2023-05-11 DIAGNOSIS — M15.9 OSTEOARTHRITIS OF MULTIPLE JOINTS, UNSPECIFIED OSTEOARTHRITIS TYPE: ICD-10-CM

## 2023-05-11 DIAGNOSIS — E53.8 B12 DEFICIENCY: Primary | ICD-10-CM

## 2023-05-11 DIAGNOSIS — F43.29 ADJUSTMENT DISORDER WITH OTHER SYMPTOM: ICD-10-CM

## 2023-05-11 DIAGNOSIS — G62.9 NEUROPATHY: ICD-10-CM

## 2023-05-11 RX ORDER — GABAPENTIN 300 MG/1
300 CAPSULE ORAL 3 TIMES DAILY
Qty: 90 CAPSULE | Refills: 2 | Status: SHIPPED | OUTPATIENT
Start: 2023-05-11

## 2023-05-11 RX ORDER — SERTRALINE HYDROCHLORIDE 25 MG/1
25 TABLET, FILM COATED ORAL DAILY
Qty: 90 TABLET | Refills: 1 | Status: SHIPPED | OUTPATIENT
Start: 2023-05-11

## 2023-05-11 RX ORDER — LORAZEPAM 0.5 MG/1
0.5 TABLET ORAL EVERY 8 HOURS PRN
Qty: 30 TABLET | Refills: 2 | Status: SHIPPED | OUTPATIENT
Start: 2023-05-11

## 2023-05-11 RX ORDER — CYANOCOBALAMIN 1000 UG/ML
1000 INJECTION, SOLUTION INTRAMUSCULAR; SUBCUTANEOUS
Status: SHIPPED | OUTPATIENT
Start: 2023-05-11

## 2023-05-11 RX ORDER — CYANOCOBALAMIN 1000 UG/ML
1000 INJECTION, SOLUTION INTRAMUSCULAR; SUBCUTANEOUS
Status: DISCONTINUED | OUTPATIENT
Start: 2023-05-11 | End: 2023-05-11

## 2023-05-11 RX ORDER — BUPROPION HYDROCHLORIDE 300 MG/1
300 TABLET ORAL DAILY
Qty: 90 TABLET | Refills: 1 | Status: SHIPPED | OUTPATIENT
Start: 2023-05-11

## 2023-05-11 RX ADMIN — CYANOCOBALAMIN 1000 MCG: 1000 INJECTION, SOLUTION INTRAMUSCULAR; SUBCUTANEOUS at 10:23

## 2023-05-11 NOTE — ASSESSMENT & PLAN NOTE
I had a long discussion with patient.  She is still missing her mom.  Her mom passed away in January.  She is going to visit her mom this Sunday on Mother's Day.    We are going to decrease her sertraline down to 25 mg.  We are also going to increase her Wellbutrin to 300 mg.  We will do this slowly.  I had a discussion with her about serotonin syndrome and the different symptoms.  As long as she does well we will recheck in 3 months.

## 2023-05-11 NOTE — PROGRESS NOTES
Patient Name: Shelley Leach  : 1955   MRN: [id number]     Chief Complaint:    Chief Complaint   Patient presents with   • Med Refill     Took away Effexor and changed to Wellbutrin pt here for 3 week follow       History of Present Illness: Shelley Leach is a 68 y.o. female who is here today for follow up on adjustment disorder  HPI        Review of Systems:   Review of Systems   Constitutional: Negative.    HENT: Negative.    Eyes: Negative.    Respiratory: Negative.    Cardiovascular: Negative.    Gastrointestinal: Negative.    Neurological: Negative.         Past Medical History:   Past Medical History:   Diagnosis Date   • Bilateral carotid artery stenosis    • Clostridioides difficile infection     15 YEARS AGO, TREATED   • Dietary counseling     diet education-Abstracted from [employer]   • Dietary counseling and surveillance    • Diverticulosis    • Elevated cholesterol    • GERD (gastroesophageal reflux disease)    • Goiter    • History of thyroid disorder     Thyroid problem-Abstracted from [employer]   • History of transfusion     NO REACTION   • Hypertension    • Hypothyroidism    • Leaky heart valve    • PONV (postoperative nausea and vomiting)    • Type 2 diabetes mellitus, uncontrolled    • Vitamin D deficiency        Past Surgical History:   Past Surgical History:   Procedure Laterality Date   • BLEPHAROPLASTY Bilateral    • CARDIAC CATHETERIZATION      NO INTERVENTION-20 YRS AGO   • CAROTID ARTERY ANGIOPLASTY N/A    • CAROTID ENDARTERECTOMY Right 2021    Procedure: CAROTID ENDARTERECTOMY WITH EEG RIGHT;  Surgeon: Javed Morris MD;  Location: [facility];  Service: Vascular;  Laterality: Right;   • CARPAL TUNNEL RELEASE Bilateral    • CATARACT EXTRACTION     • CHOLECYSTECTOMY     • COLONOSCOPY     • GASTRIC BYPASS     • HYSTERECTOMY     • INTERVENTIONAL RADIOLOGY PROCEDURE Bilateral 2021    Procedure: Carotid Cerebral Angiogram;  Surgeon: Sukumar Francis MD;   Location: [facility] INVASIVE LOCATION;  Service: Interventional Radiology;  Laterality: Bilateral;   • OVARIAN CYST SURGERY      x 2   • ROTATOR CUFF REPAIR Left    • THYROIDECTOMY, PARTIAL     • TONSILLECTOMY         Family History:   Family History   Problem Relation Age of Onset   • Diabetes Mother    • Anemia Mother    • Cancer Mother    • Hypertension Mother    • Diabetes Father    • Hypertension Father    • Thyroid disease Father    • Kidney disease Father    • Cancer Father    • Obesity Father    • Kidney disease Maternal Grandfather    • Hypertension Maternal Grandmother    • Obesity Paternal Grandmother    • Cancer Maternal Aunt    • Cancer Paternal Aunt         Breast   • Diabetes Paternal Aunt    • Cancer Maternal Uncle         Bladder   • Diabetes Maternal Uncle    • Hypertension Maternal Uncle    • Kidney disease Maternal Uncle    • Obesity Maternal Uncle    • Cancer Maternal Aunt         Breast   • Diabetes Maternal Aunt    • Diabetes Brother    • Hypertension Brother    • Kidney disease Brother    • Obesity Brother    • Diabetes Maternal Uncle    • Diabetes Maternal Aunt        Social History:   Social History     Socioeconomic History   • Marital status:    • Number of children: 2   Tobacco Use   • Smoking status: Never   • Smokeless tobacco: Never   Vaping Use   • Vaping Use: Never used   Substance and Sexual Activity   • Alcohol use: Never   • Drug use: Never   • Sexual activity: Yes     Partners: Male     Birth control/protection: Hysterectomy       Medications:     Current Outpatient Medications:   •  aspirin 81 MG EC tablet, Take 1 tablet by mouth Daily., Disp: , Rfl:   •  atorvastatin (LIPITOR) 10 MG tablet, TAKE 1 TABLET EVERY NIGHT, Disp: 90 tablet, Rfl: 1  •  Biotin 96687 MCG tablet dispersible, Place 10,000 mcg on the tongue 2 (two) times a day., Disp: , Rfl:   •  Blood Glucose Monitoring Suppl (True Metrix Air Glucose Meter) w/Device kit, 1 each 4 (Four) Times a Day Before Meals &  at Bedtime As Needed (prn)., Disp: 1 kit, Rfl: 0  •  carvedilol (COREG) 6.25 MG tablet, Take 1 tablet by mouth Every 12 (Twelve) Hours., Disp: 180 tablet, Rfl: 1  •  cetirizine (zyrTEC) 10 MG tablet, TAKE 1 TABLET EVERY DAY, Disp: 90 tablet, Rfl: 1  •  clopidogrel (PLAVIX) 75 MG tablet, TAKE 1 TABLET EVERY DAY, Disp: 90 tablet, Rfl: 3  •  Continuous Blood Gluc Sensor (Dexcom G6 Sensor), Every 10 (Ten) Days., Disp: 9 each, Rfl: 3  •  Continuous Blood Gluc Transmit (Dexcom G6 Transmitter) misc, 1 each Every 3 (Three) Months., Disp: 1 each, Rfl: 3  •  Cyanocobalamin (B-12 Compliance Injection) 1000 MCG/ML kit, Inject  as directed., Disp: , Rfl:   •  estradiol (ESTRACE) 0.1 MG/GM vaginal cream, INSERT 1G VAGINALY TWICE A WEEK AT BEDTIME, Disp: , Rfl:   •  Evolocumab (Repatha) solution prefilled syringe injection, Inject 1 mL under the skin into the appropriate area as directed Every 14 (Fourteen) Days., Disp: 6 mL, Rfl: 5  •  ezetimibe (ZETIA) 10 MG tablet, TAKE 1 TABLET EVERY DAY, Disp: 90 tablet, Rfl: 1  •  famotidine (PEPCID) 40 MG tablet, , Disp: , Rfl:   •  fenofibrate (TRICOR) 145 MG tablet, Take 1 tablet by mouth Daily., Disp: , Rfl:   •  ferrous sulfate 300 (60 Fe) MG/5ML syrup, Take 5 mL by mouth Daily With Breakfast., Disp: , Rfl:   •  fluconazole (DIFLUCAN) 150 MG tablet, Take 1 tablet by mouth 1 (One) Time As Needed (YEAST). As needed, Disp: , Rfl:   •  gabapentin (NEURONTIN) 300 MG capsule, Take 1 capsule by mouth 3 (Three) Times a Day., Disp: 90 capsule, Rfl: 2  •  glucose blood (True Metrix Blood Glucose Test) test strip, Pt to test 3 to 4 times a day as needed to calculate continuous meter, Disp: 100 each, Rfl: 11  •  Insulin Aspart (novoLOG) 100 UNIT/ML injection, 100 units per day via pump, Disp: 90 mL, Rfl: 3  •  Insulin Disposable Pump (Omnipod 5 G6 Pod, Gen 5,) misc, 1 each Every 72 (Seventy-Two) Hours., Disp: 30 each, Rfl: 3  •  levothyroxine (SYNTHROID, LEVOTHROID) 100 MCG tablet, TAKE 1 TABLET  EVERY DAY, Disp: 90 tablet, Rfl: 1  •  LORazepam (ATIVAN) 0.5 MG tablet, Take 1 tablet by mouth Every 8 (Eight) Hours As Needed for Anxiety., Disp: 30 tablet, Rfl: 2  •  losartan (COZAAR) 50 MG tablet, Take 1 tablet by mouth Daily., Disp: 90 tablet, Rfl: 1  •  Multiple Vitamins-Minerals (BARIATRIC FUSION PO), Take 1 tablet by mouth Daily. Hair skin and nails, Disp: , Rfl:   •  multivitamin (THERAGRAN) tablet tablet, take 1 tablet by oral route  every day with food, Disp: , Rfl:   •  Myrbetriq 25 MG tablet sustained-release 24 hour 24 hr tablet, TAKE 1 TABLET DAILY,       SWALLOWING WHOLE WITH      WATER. DO NOT CRUSH, CHEW, AND/OR DIVIDE, Disp: 90 tablet, Rfl: 0  •  Probiotic Product (TRUBIOTICS PO), Take 1 capsule by mouth Daily., Disp: , Rfl:   •  sucralfate (CARAFATE) 1 g tablet, Take 1 tablet by mouth 4 (Four) Times a Day., Disp: , Rfl:   •  vitamin D3 125 MCG (5000 UT) capsule capsule, Take 2 capsules by mouth Daily., Disp: , Rfl:   •  buPROPion XL (Wellbutrin XL) 300 MG 24 hr tablet, Take 1 tablet by mouth Daily., Disp: 90 tablet, Rfl: 1  •  sertraline (Zoloft) 25 MG tablet, Take 1 tablet by mouth Daily., Disp: 90 tablet, Rfl: 1    Current Facility-Administered Medications:   •  cyanocobalamin injection 1,000 mcg, 1,000 mcg, Intramuscular, Q28 Days, Tom Salas MD, 1,000 mcg at 03/30/23 1015  •  cyanocobalamin injection 1,000 mcg, 1,000 mcg, Intramuscular, Q28 Days, Tom Salas MD    Facility-Administered Medications Ordered in Other Visits:   •  Chlorhexidine Gluconate Cloth 2 % pads 1 application, 1 application, Topical, Q12H PRN, Sarah Wan PA-C  •  Chlorhexidine Gluconate Cloth 2 % pads 1 application, 1 application, Topical, Q12H PRN, Jeffery Sumner PA    Allergies:   Allergies   Allergen Reactions   • Percocet [Oxycodone-Acetaminophen] Itching   • Morphine Other (See Comments)     HYPOTENSION   • Sulfamethoxazole-Trimethoprim Swelling   • Penicillins Rash         Physical Exam:  Vital  "Signs:   Vitals:    05/11/23 0958   BP: 138/68   BP Location: Left arm   Patient Position: Sitting   Cuff Size: Adult   Pulse: 68   Resp: 14   Temp: 97.6 °F (36.4 °C)   TempSrc: Temporal   SpO2: 98%   Weight: 71.2 kg (157 lb)   Height: 149.9 cm (59\")   PainSc: 0-No pain     Body mass index is 31.71 kg/m².     Physical Exam  Vitals and nursing note reviewed.   Constitutional:       Appearance: Normal appearance. She is normal weight.   HENT:      Head: Normocephalic and atraumatic.      Right Ear: Tympanic membrane, ear canal and external ear normal.      Left Ear: Tympanic membrane, ear canal and external ear normal.      Nose: Nose normal.      Mouth/Throat:      Mouth: Mucous membranes are dry.      Pharynx: Oropharynx is clear.   Eyes:      Extraocular Movements: Extraocular movements intact.      Conjunctiva/sclera: Conjunctivae normal.      Pupils: Pupils are equal, round, and reactive to light.   Cardiovascular:      Rate and Rhythm: Normal rate and regular rhythm.      Pulses: Normal pulses.      Heart sounds: Normal heart sounds.   Pulmonary:      Effort: Pulmonary effort is normal.      Breath sounds: Normal breath sounds.   Musculoskeletal:      Cervical back: Normal range of motion and neck supple.   Feet:      Comments:      Neurological:      Mental Status: She is alert.         Procedures      Assessment/Plan:   Diagnoses and all orders for this visit:    1. B12 deficiency (Primary)  Assessment & Plan:  We will give her vitamin B12 shot today.    Orders:  -     Discontinue: cyanocobalamin injection 1,000 mcg  -     cyanocobalamin injection 1,000 mcg    2. Neuropathy  Assessment & Plan:  Refill gabapentin.    Orders:  -     gabapentin (NEURONTIN) 300 MG capsule; Take 1 capsule by mouth 3 (Three) Times a Day.  Dispense: 90 capsule; Refill: 2    3. Anxiety  Assessment & Plan:  Ativan as needed.    Orders:  -     LORazepam (ATIVAN) 0.5 MG tablet; Take 1 tablet by mouth Every 8 (Eight) Hours As Needed for " Anxiety.  Dispense: 30 tablet; Refill: 2    4. Adjustment disorder with other symptom  Assessment & Plan:  I had a long discussion with patient.  She is still missing her mom.  Her mom passed away in January.  She is going to visit her mom this Sunday on Mother's Day.    We are going to decrease her sertraline down to 25 mg.  We are also going to increase her Wellbutrin to 300 mg.  We will do this slowly.  I had a discussion with her about serotonin syndrome and the different symptoms.  As long as she does well we will recheck in 3 months.      5. Osteoarthritis of multiple joints, unspecified osteoarthritis type  Assessment & Plan:  Fill meds.  She is scheduled see orthopedic surgeon and have her hand and knee operated.      Other orders  -     buPROPion XL (Wellbutrin XL) 300 MG 24 hr tablet; Take 1 tablet by mouth Daily.  Dispense: 90 tablet; Refill: 1  -     sertraline (Zoloft) 25 MG tablet; Take 1 tablet by mouth Daily.  Dispense: 90 tablet; Refill: 1           Follow Up:   Return in about 3 months (around 8/11/2023) for Annual physical.    Tom Salas MD  [facility]     Answers for HPI/ROS submitted by the patient on 5/4/2023  Please describe your symptoms.: Na  Have you had these symptoms before?: No  How long have you been having these symptoms?: 1-4 days  Please list any medications you are currently taking for this condition.: Na  Please describe any probable cause for these symptoms. : Na  What is the primary reason for your visit?: Other

## 2023-05-12 ENCOUNTER — TELEPHONE (OUTPATIENT)
Dept: CASE MANAGEMENT | Facility: OTHER | Age: 68
End: 2023-05-12
Payer: MEDICARE

## 2023-05-17 ENCOUNTER — TELEPHONE (OUTPATIENT)
Dept: ENDOCRINOLOGY | Facility: CLINIC | Age: 68
End: 2023-05-17
Payer: MEDICARE

## 2023-05-25 ENCOUNTER — PATIENT OUTREACH (OUTPATIENT)
Dept: CASE MANAGEMENT | Facility: OTHER | Age: 68
End: 2023-05-25
Payer: MEDICARE

## 2023-05-25 DIAGNOSIS — E11.65 UNCONTROLLED TYPE 2 DIABETES MELLITUS WITH HYPERGLYCEMIA: ICD-10-CM

## 2023-05-25 DIAGNOSIS — E78.2 HYPERLIPIDEMIA, MIXED: ICD-10-CM

## 2023-05-25 DIAGNOSIS — N18.32 CHRONIC KIDNEY DISEASE, STAGE 3B: Primary | ICD-10-CM

## 2023-05-25 RX ORDER — CETIRIZINE HYDROCHLORIDE 10 MG/1
TABLET ORAL
Qty: 90 TABLET | Refills: 0 | Status: SHIPPED | OUTPATIENT
Start: 2023-05-25

## 2023-05-25 RX ORDER — LEVOTHYROXINE SODIUM 0.1 MG/1
TABLET ORAL
Qty: 90 TABLET | Refills: 0 | Status: SHIPPED | OUTPATIENT
Start: 2023-05-25

## 2023-05-25 NOTE — TELEPHONE ENCOUNTER
Rx Refill Note    Requested Prescriptions     Pending Prescriptions Disp Refills   • levothyroxine (SYNTHROID, LEVOTHROID) 100 MCG tablet [Pharmacy Med Name: LEVOTHYROXINE SODIUM 100 MCG Tablet] 90 tablet 0     Sig: TAKE 1 TABLET EVERY DAY   • cetirizine (zyrTEC) 10 MG tablet [Pharmacy Med Name: CETIRIZINE HYDROCHLORIDE 10 MG Tablet] 90 tablet 0     Sig: TAKE 1 TABLET EVERY DAY        Last office visit with prescribing clinician: 5/11/2023      Next office visit with prescribing clinician: 8/14/2023   Last labs:   Last refill: needs   Pharmacy (be sure to add in Epic). correct

## 2023-05-25 NOTE — OUTREACH NOTE
AMBULATORY CASE MANAGEMENT NOTE    Name and Relationship of Patient/Support Person: Shelley Leach - Basil    CCM Interim Update  Spoke with Alberta for CCM update. She does not have living will. Will mail living will packet to her for herself and her .     She continues to utilize the dexcom for her blood sugars. She states that her blood sugar is good in the a.m. and at night.  But during the day it is running higher. She states she is forgetting to calculate her carbs. Discussed the importance of including that information.  She currently is not exercising. She is active with fishing, housework and gardening. Discussed the importance of exercise to increase heart rate.      Patient states that her rash has completely resolved.     Adult Patient Profile  Questions/Answers    Flowsheet Row Most Recent Value   Hearing Difficulty or Deaf no   Wear Glasses or Blind no   Concentrating, Remembering or Making Decisions Difficulty no   Difficulty Communicating no   Difficulty Eating/Swallowing no   Walking or Climbing Stairs Difficulty no   Dressing/Bathing Difficulty no   Doing Errands Independently Difficulty (such as shopping) no   Equipment Currently Used at Home glucometer, bp cuff, pulse ox   Change in Functional Status Since Onset of Current Illness/Injury no   Advance Directive Status Patient does not have advance directive   Fall Risk Category Low   Q1: How often do you have a drink containing alcohol? Never   Q2: How many drinks containing alcohol do you have on a typical day when you are drinking? None   Q3: How often do you have six or more drinks on one occasion? Never   Audit-C Score 0   Little Interest or Pleasure in Doing Things 0-->not at all   Feeling Down, Depressed or Hopeless 2-->more than half the days   PHQ-2 Total Score 2   Trouble Falling or Staying Asleep, or Sleeping Too Much 1-->several days   Feeling Tired or Having Little Energy 0-->not at all   Poor Appetite or Overeating 0-->not  at all   Feeling Bad about Yourself - or that You are a Failure or Have Let Yourself or Your Family Down 0-->not at all   Trouble Concentrating on Things, Such as Reading the Newspaper or Watching Television 0-->not at all   Moving or Speaking So Slowly that Other People Could Have Noticed? Or the Opposite - Being So Fidgety 0-->not at all   Thoughts that You Would be Better Off Dead or of Hurting Yourself in Some Way 0-->not at all   PHQ-9: Brief Depression Severity Measure Score 3   If You Checked Off Any Problems, How Difficult Have These Problems Made It For You to Do Your Work, Take Care of Things at Home, or Get Along with Other People? not difficult at all      SDOH updated and reviewed with the patient during this program:  Financial Resource Strain: Low Risk    • Difficulty of Paying Living Expenses: Not hard at all      Physical Activity: Inactive   • Days of Exercise per Week: 0 days   • Minutes of Exercise per Session: 0 min      Food Insecurity: No Food Insecurity   • Worried About Running Out of Food in the Last Year: Never true   • Ran Out of Food in the Last Year: Never true      Social Connections: Moderately Isolated   • Frequency of Communication with Friends and Family: More than three times a week   • Frequency of Social Gatherings with Friends and Family: More than three times a week   • Attends Sikhism Services: Never   • Active Member of Clubs or Organizations: No   • Attends Club or Organization Meetings: Never   • Marital Status:       Transportation Needs: No Transportation Needs   • Lack of Transportation (Medical): No   • Lack of Transportation (Non-Medical): No      Housing Stability: Low Risk    • Unable to Pay for Housing in the Last Year: No   • Number of Places Lived in the Last Year: 1   • Unstable Housing in the Last Year: No      Stress: No Stress Concern Present   • Feeling of Stress : Only a little         Education Documentation  No documentation found.        Kizzy  E  Ambulatory Case Management    5/25/2023, 14:35 EDT

## 2023-05-26 ENCOUNTER — DOCUMENTATION (OUTPATIENT)
Dept: DIABETES SERVICES | Facility: HOSPITAL | Age: 68
End: 2023-05-26
Payer: MEDICARE

## 2023-05-26 NOTE — PLAN OF CARE
Patient had contacted office with concerns of hyperglycemia post meal. Mandi report ran and discussed finding with MD. Changes were made to Insulin to carb ratio decreased to 1:13 and Active insulin time decreased to 3 hours. Patient contacted and was able to guide patient via phone to correct both of these settings. She was able to teach back instructions and voiced success with objectives. I encouraged her to reach out with anymore concerns.   
Female

## 2023-05-30 ENCOUNTER — PATIENT OUTREACH (OUTPATIENT)
Dept: CASE MANAGEMENT | Facility: OTHER | Age: 68
End: 2023-05-30

## 2023-05-30 DIAGNOSIS — N18.32 CHRONIC KIDNEY DISEASE, STAGE 3B: Primary | ICD-10-CM

## 2023-05-30 DIAGNOSIS — E11.65 UNCONTROLLED TYPE 2 DIABETES MELLITUS WITH HYPERGLYCEMIA: ICD-10-CM

## 2023-05-30 DIAGNOSIS — E78.2 HYPERLIPIDEMIA, MIXED: ICD-10-CM

## 2023-05-30 RX ORDER — LOSARTAN POTASSIUM 50 MG/1
50 TABLET ORAL DAILY
Qty: 90 TABLET | Refills: 1 | Status: SHIPPED | OUTPATIENT
Start: 2023-05-30

## 2023-05-30 NOTE — OUTREACH NOTE
[facility] End of Month Documentation    This Chronic Medical Management Care Plan for Shelley Leach, 68 y.o. female, has been established; monitored and managed; a new plan of care implemented and a new plan of care implemented for the month of May.  A cumulative time of 27  minutes was spent on this patient record this month, including phone call with patient; chart review.    Regarding the patient's problems: has Acquired hypothyroidism; Hypercholesteremia; Hypoglycemia associated with type 2 diabetes mellitus; Uncontrolled type 2 diabetes mellitus with hyperglycemia; Vitamin D deficiency; Stenosis of right carotid artery s/p RIGHT CEA 2021; Osteoarthritis; Gastroesophageal reflux disease; CKD stage 3 due to type 2 diabetes mellitus; Hypertension, essential; B12 deficiency; Chronic kidney disease, stage 3b; Encounter for long-term (current) use of other medications; Hyperlipidemia, mixed; Incontinence of feces; Left carotid artery occlusion; Decreased libido; Adjustment disorder; Burn; Skin rash; Anxiety; and Neuropathy on their problem list., the following items were addressed: medical records; medications and any changes can be found within the plan section of the note.  A detailed listing of time spent for chronic care management is tracked within each outreach encounter.  Current medications include:  has a current medication list which includes the following prescription(s): aspirin, atorvastatin, biotin, true metrix air glucose meter, bupropion xl, carvedilol, cetirizine, clopidogrel, dexcom g6 sensor, dexcom g6 transmitter, b-12 compliance injection, estradiol, repatha, ezetimibe, famotidine, fenofibrate, ferrous sulfate, fluconazole, gabapentin, glucose blood, insulin aspart, omnipod 5 g6 pod (gen 5), levothyroxine, lorazepam, losartan, multiple vitamins-minerals, multivitamin, myrbetriq, probiotic product, sertraline, sucralfate, and vitamin d3, and the following Facility-Administered Medications:  chlorhexidine gluconate cloth, chlorhexidine gluconate cloth, cyanocobalamin, and cyanocobalamin. and the patient is reported to be patient is compliant with medication protocol,  Medications are reported to be effective.  Regarding these diagnoses, referrals were made to the following provider(s):  n/a.  All notes on chart for PCP to review.    The patient was monitored remotely for blood pressure; activity level; blood glucose; medications.    The patient's physical needs include:  physical healthcare.     The patient's mental support needs include:  continued support    The patient's cognitive support needs include:  needs met    The patient's psychosocial support needs include:  continued support    The patient's functional needs include: physical healthcare    The patient's environmental needs include:  not applicable    Care Plan overall comments:  n/a    Refer to previous outreach notes for more information on the areas listed above.    Monthly Billing Diagnoses  (N18.32) Chronic kidney disease, stage 3b    (E78.2) Hyperlipidemia, mixed    (E11.65) Uncontrolled type 2 diabetes mellitus with hyperglycemia    Medications   · Medications have been reconciled    Care Plan progress this month:      Recently Modified Care Plans Updates made since 4/29/2023 12:00 AM     Diabetes Type 2 (Adult)         Problem Priority Last Modified     Glycemic Management (Diabetes, Type 2) --  5/25/2023  2:33 PM by Kizzy Magallon RN              Goal Recent Progress Last Modified     Glycemic Management Optimized --  5/25/2023  2:33 PM by Kizzy Magallon RN     Evidence-based guidance:   Anticipate A1C testing (point-of-care) every 3 to 6 months based on goal attainment.   Review mutually-set A1C goal or target range.   Anticipate use of antihyperglycemic with or without insulin and periodic adjustments; consider active involvement of pharmacist.   Provide medical nutrition therapy and development of individualized eating.    Compare self-reported symptoms of hypo or hyperglycemia to blood glucose levels, diet and fluid intake, current medications, psychosocial and physiologic stressors, change in activity and barriers to care adherence.   Promote self-monitoring of blood glucose levels.   Assess and address barriers to management plan, such as food insecurity, age, developmental ability, depression, anxiety, fear of hypoglycemia or weight gain, as well as medication cost, side effects and complicated regimen.   Consider referral to community-based diabetes education program, visiting nurse, community health worker or health .   Encourage regular dental care for treatment of periodontal disease; refer to dental provider when needed.    Notes:            Task Due Date Last Modified     Alleviate Barriers to Glycemic Management --  5/25/2023  2:33 PM by Kizzy Magallon RN     Care Management Activities:      - use of blood glucose monitoring log promoted      Notes:            Problem Priority Last Modified     Disease Progression (Diabetes, Type 2) --  5/25/2023  2:33 PM by Kizzy Magallon RN              Goal Recent Progress Last Modified     Disease Progression Prevented or Minimized --  5/25/2023  2:33 PM by Kizzy Magallon RN     Evidence-based guidance:   Prepare patient for laboratory and diagnostic exams based on risk and presentation.   Encourage lifestyle changes, such as increased intake of plant-based foods, stress reduction, consistent physical activity and smoking cessation to prevent long-term complications and chronic disease.    Individualize activity and exercise recommendations while considering potential limitations, such as neuropathy, retinopathy or the ability to prevent hyperglycemia or hypoglycemia.    Prepare patient for use of pharmacologic therapy that may include antihypertensive, analgesic, prostaglandin E1 with periodic adjustments, based on presenting chronic condition and laboratory results.  "  Assess signs/symptoms and risk factors for hypertension, sleep-disordered breathing, neuropathy (including changes in gait and balance), retinopathy, nephropathy and sexual dysfunction.   Address pregnancy planning and contraceptive choice, especially when prescribing antihypertensive or statin.   Ensure completion of annual comprehensive foot exam and dilated eye exam.    Implement additional individualized goals and interventions based on identified risk factors.   Prepare patient for consultation or referral for specialist care, such as ophthalmology, neurology, cardiology, podiatry, nephrology or perinatology.    Notes:            Task Due Date Last Modified     Monitor and Manage Follow-up for Comorbidities --  5/25/2023  2:33 PM by Kizzy Magallon RN     Care Management Activities:      - activity based on tolerance and functional limitations encouraged  - healthy lifestyle promoted      Notes:               Hypertension (Adult)         Problem Priority Last Modified     Hypertension (Hypertension) --  5/25/2023  2:33 PM by Kizzy Magallon RN              Goal Recent Progress Last Modified     Hypertension Monitored --  5/25/2023  2:33 PM by Kizzy Magallon RN     Evidence-based guidance:   Promote initial use of ambulatory blood pressure measurements (for 3 days) to rule out \"white-coat\" effect; identify masked hypertension and presence or absence of nocturnal \"dipping\" of blood pressure.    Encourage continued use of home blood pressure monitoring and recording in blood pressure log; include symptoms of hypotension or potential medication side effects in log.   Review blood pressure measurements taken inside and outside of the provider office; establish baseline and monitor trends; compare to target ranges or patient goal.   Share overall cardiovascular risk with patient; encourage changes to lifestyle risk factors, including alcohol consumption, smoking, inadequate exercise, poor dietary habits " and stress.    Notes:            Task Due Date Last Modified     Identify and Monitor Blood Pressure Elevation --  5/25/2023  2:33 PM by Kizzy Magallon RN     Care Management Activities:      - home or ambulatory blood pressure monitoring encouraged      Notes:            Problem Priority Last Modified     Disease Progression (Hypertension) --  5/25/2023  2:33 PM by Kizzy Magallon RN              Goal Recent Progress Last Modified     Disease Progression Prevented or Minimized --  5/25/2023  2:33 PM by Kizzy Magallon RN     Evidence-based guidance:   Tailor lifestyle advice to individual; review progress regularly; give frequent encouragement and respond positively to incremental successes.   Assess for and promote awareness of worsening disease or development of comorbidity.   Prepare patient for laboratory and diagnostic exams based on risk and presentation.   Prepare patient for use of pharmacologic therapy that may include diuretic, beta-blocker, beta-blocker/thiazide combination, angiotensin-converting enzyme inhibitor, renin-angiotensin blocker or calcium-channel blocker.   Expect periodic adjustments to pharmacologic therapy; manage side effects.   Promote a healthy diet that includes primarily plant-based foods, such as fruits, vegetables, whole grains, beans and legumes, low-fat dairy and lean meats.    Consider moderate reduction in sodium intake by avoiding the addition of salt to prepared foods and limiting processed meats, canned soup, frozen meals and salty snacks.    Promote a regular, daily exercise goal of 150 minutes per week of moderate exercise based on tolerance, ability and patient choice; consider referral to physical therapist, community wellness and/or activity program.   Encourage the avoidance of no more than 2 hours per day of sedentary activity, such as recreational screen time.   Review sources of stress; explore current coping strategies and encourage use of mindfulness,  yoga, meditation or exercise to manage stress.    Notes:            Task Due Date Last Modified     Alleviate Barriers to Hypertension Treatment --  5/25/2023  2:33 PM by Kizzy Magallon RN     Care Management Activities:      - healthy diet promoted  - healthy family lifestyle promoted      Notes:            Problem Priority Last Modified     Resistant Hypertension (Hypertension) --  5/25/2023  2:33 PM by Kizzy Magallon RN              Goal Recent Progress Last Modified     Response to Treatment Maximized --  5/25/2023  2:33 PM by Kizzy Magallon RN     Evidence-based guidance:   Assess patient response to treatment, including presence or absence of medication side effects, degree of blood pressure control and patient satisfaction.   Assess technique (including cuff size and placement), measurement times, condition and calibration of blood pressure cuff set (both at-home and in-office equipment).   Assess factors that may influence response to treatment, including nonadherence to pharmacologic treatment plan, diet or activity changes and/or presence of pain, stress or sleep disturbance.   Screen for signs and symptoms of depression; if present, refer for or complete a comprehensive assessment.   Evaluate social and economic barriers that may affect adherence to treatment plan   Address pharmacologic nonadherence by simplifying dosing regimen, counseling or support by pharmacist, financial assistance, self-monitoring of blood pressure, use of motivational interviewing, voice or text messages.   Encourage behavioral adherence strategies, like habit-based interventions that link medication taking with existing daily routines.   Assess barriers to regular, daily physical activity; support family or support person-oriented activity changes and utilization of community activity or sports program.   Address barriers to dietary changes, especially sodium restriction, with referrals to community programs, like  cooking classes, meal services or intensive education when available.   Refer to community-based peer support program or nurse home-visiting program.   Assess for chronic pain; when present add additional goals (Chronic Pain Care Plan Guide) as needed.   Provide frequent follow-up by telephone, telemonitoring, patient-practice portal or with home visit.   Review alcohol use screen; address using brief intervention beginning with risk that interferes with blood pressure control; refer for treatment when excessive alcohol use is noted.   Screen for obstructive sleep apnea; prepare patient for polysomnography based on risk and presentation and use of noninvasive ventilation to relieve obstructive sleep apnea when present.    Notes:            Task Due Date Last Modified     Facilitate Adherence to Lifestyle Change --  5/25/2023  2:33 PM by Kizzy Magallon RN     Care Management Activities:      - support and encouragement provided      Notes:                    · Current Specialty Plan of Care Status signed by both patient and provider    Instructions   · Patient was provided an electronic copy of care plan  · CCM services were explained and offered and patient has accepted these services.  · Patient has given their written consent to receive CCM services and understands that this includes the authorization of electronic communication of medical information with the other treating providers.  · Patient understands that they may stop CCM services at any time and these changes will be effective at the end of the calendar month and will effectively revocate the agreement of CCM services.  · Patient understands that only one practitioner can furnish and be paid for CCM services during one calendar month.  Patient also understands that there may be co-payment or deductible fees in association with CCM services.  · Patient will continue with at least monthly follow-up calls with the Ambulatory .    Kizzy  E  Ambulatory Case Management    5/30/2023, 10:29 EDT

## 2023-05-30 NOTE — TELEPHONE ENCOUNTER
Rx Refill Note    Requested Prescriptions     Pending Prescriptions Disp Refills   • losartan (COZAAR) 50 MG tablet 90 tablet 1     Sig: Take 1 tablet by mouth Daily.        Last office visit with prescribing clinician: 5/11/2023      Next office visit with prescribing clinician: 8/14/2023   Last labs:   [facility]

## 2023-05-31 ENCOUNTER — OFFICE VISIT (OUTPATIENT)
Dept: ENDOCRINOLOGY | Facility: CLINIC | Age: 68
End: 2023-05-31

## 2023-05-31 VITALS
SYSTOLIC BLOOD PRESSURE: 170 MMHG | HEART RATE: 62 BPM | DIASTOLIC BLOOD PRESSURE: 70 MMHG | WEIGHT: 158 LBS | BODY MASS INDEX: 31.85 KG/M2 | HEIGHT: 59 IN

## 2023-05-31 DIAGNOSIS — E11.65 UNCONTROLLED TYPE 2 DIABETES MELLITUS WITH HYPERGLYCEMIA: Primary | ICD-10-CM

## 2023-05-31 DIAGNOSIS — S80.862A INSECT BITE OF LEFT LOWER LEG, INITIAL ENCOUNTER: ICD-10-CM
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
## 2023-05-31 LAB
EXPIRATION DATE: ABNORMAL
EXPIRATION DATE: NORMAL
GLUCOSE BLDC GLUCOMTR-MCNC: 256 MG/DL (ref 70–130)
HBA1C MFR BLD: 7.7 %
Lab: ABNORMAL
Lab: NORMAL

## 2023-05-31 RX ORDER — DOXYCYCLINE HYCLATE 100 MG/1
100 CAPSULE ORAL 2 TIMES DAILY
Qty: 20 CAPSULE | Refills: 0 | Status: SHIPPED | OUTPATIENT
Start: 2023-05-31

## 2023-05-31 NOTE — ASSESSMENT & PLAN NOTE
Improved. Cheanges: give meal time insulin aftr meals instead of before. Stop overtreating the lows.

## 2023-05-31 NOTE — PROGRESS NOTES
"     Office Note      Date: 2023  Patient Name: Shelley Leach  MRN: [id number]  : 1955    Chief Complaint   Patient presents with   • Diabetes     Type II   • Hypothyroidism       History of Present Illness:   Shelley Leach is a 68 y.o. female who presents for Diabetes type 2.   Current RX insulin in an op 5 with dexcom  Bg is checked 288 times per day with dexcom.  Insulin doses are adjusted frequently based upon the readings.  Patient has occasional hypoglycemia.  Patient has been using the system during the last 3 months.     the last 2 weeks of dexcom data are reviewed.   69 % in range. 1 % low. 17 5 high, 13 % very high. The pattern shows that the hyperglycemia occurs after the hypoglycemia which she overtreats.      Last A1c:  Hemoglobin A1C   Date Value Ref Range Status   2023 7.7 % Final   2021 10.40 (H) 4.80 - 5.60 % Final       Changes in health since last visit: had insect bit on her leg it is sore and red . Last eye exam  Up to date.    Subjective           Review of Systems:   Review of Systems   Constitutional: Negative.    HENT: Negative.    Eyes: Negative.    Respiratory: Negative.        The following portions of the patient's history were reviewed and updated as appropriate: allergies, current medications, past family history, past medical history, past social history, past surgical history and problem list.    Objective     Visit Vitals  /70 (BP Location: Left arm, Patient Position: Sitting, Cuff Size: Adult)   Pulse 62   Ht 149.9 cm (59\")   Wt 71.7 kg (158 lb)   BMI 31.91 kg/m²           Physical Exam:  Physical Exam  Vitals reviewed.   Constitutional:       Appearance: Normal appearance.   Skin:     Comments: Infected insect bite(tick) on leg   Neurological:      Mental Status: She is alert.   Psychiatric:         Mood and Affect: Mood normal.         Behavior: Behavior normal.         Thought Content: Thought content normal.         Judgment: Judgment " normal.          Assessment / Plan      Assessment & Plan:  Problem List Items Addressed This Visit        Other    Uncontrolled type 2 diabetes mellitus with hyperglycemia - Primary    Current Assessment & Plan     Improved. Cheanges: give meal time insulin aftr meals instead of before. Stop overtreating the lows.          Relevant Medications    Insulin Aspart (novoLOG) 100 UNIT/ML injection    Blood Glucose Monitoring Suppl (True Metrix Air Glucose Meter) w/Device kit    Other Relevant Orders    POC Glucose, Blood (Completed)    POC Glycosylated Hemoglobin (Hb A1C) (Completed)    Microalbumin / Creatinine Urine Ratio - Urine, Clean Catch    Insect bite of left lower leg    Current Assessment & Plan     Clearly infected. Will treat with doxy due to allergies to multiple other antibiotics              Sonu Talbot MD   05/31/2023

## 2023-07-24 ENCOUNTER — TELEPHONE (OUTPATIENT)
Dept: FAMILY MEDICINE CLINIC | Facility: CLINIC | Age: 68
End: 2023-07-24
Payer: MEDICARE

## 2023-07-24 RX ORDER — SERTRALINE HYDROCHLORIDE 25 MG/1
25 TABLET, FILM COATED ORAL DAILY
Qty: 90 TABLET | Refills: 1 | OUTPATIENT
Start: 2023-07-24

## 2023-07-24 NOTE — TELEPHONE ENCOUNTER
Caller: Shelley Leach    Relationship: Self    Best call back number:[phone]     What was the call regarding:   PATIENT WOULD LIKE A CALL BACK REGARDING IF THE OFFICE HAS ANY SAMPLES OF MEDICATION THAT SHE IS ABLE TO COME INTO THE OFFICE AND      Myrbetriq 25 MG tablet sustained-release 24 hour 24 hr tablet     OR     FARXIGA 5MG OR 10MG

## 2023-07-27 ENCOUNTER — PATIENT OUTREACH (OUTPATIENT)
Dept: CASE MANAGEMENT | Facility: OTHER | Age: 68
End: 2023-07-27
Payer: MEDICARE

## 2023-07-27 DIAGNOSIS — E11.65 UNCONTROLLED TYPE 2 DIABETES MELLITUS WITH HYPERGLYCEMIA: ICD-10-CM

## 2023-07-27 DIAGNOSIS — E78.2 HYPERLIPIDEMIA, MIXED: ICD-10-CM

## 2023-07-27 DIAGNOSIS — N18.32 CHRONIC KIDNEY DISEASE, STAGE 3B: Primary | ICD-10-CM

## 2023-07-27 NOTE — OUTREACH NOTE
AMBULATORY CASE MANAGEMENT NOTE    Name and Relationship of Patient/Support Person: Haylee, Alberta Elana - Self    CCM Interim Update    Spoke with mrs. Leach for CCM update. She states that she has been doing well. Since she has had her dexcom her A1c has gone from 10.4 to 7.7. Praise given. She has been very active in her garden but has not started exercise. Encouragement given. She has not significantly changed her diet but has decreased appetite.     She has had lows including night time lows. One night she was alerted and it was so low it could not be read. She drank rebekah d and it came up to 47. She does not have script for glucagon. Will request order from Dr. Salas and see if patient can get Gvoke.     At Endocrinology appointment she was noted to have an insect bite and was placed on antibiotics. She states that this has completely healed.     Her bp was 170/70 at last appointment.  Will have patient monitor and keep log.         Education Documentation  No documentation found.        Kizzy SUAREZ  Ambulatory Case Management    7/27/2023, 15:53 EDT

## 2023-07-28 ENCOUNTER — PATIENT OUTREACH (OUTPATIENT)
Dept: CASE MANAGEMENT | Facility: OTHER | Age: 68
End: 2023-07-28
Payer: MEDICARE

## 2023-07-28 DIAGNOSIS — E78.2 HYPERLIPIDEMIA, MIXED: ICD-10-CM

## 2023-07-28 DIAGNOSIS — N18.32 CHRONIC KIDNEY DISEASE, STAGE 3B: Primary | ICD-10-CM

## 2023-07-28 DIAGNOSIS — E11.65 UNCONTROLLED TYPE 2 DIABETES MELLITUS WITH HYPERGLYCEMIA: ICD-10-CM

## 2023-07-28 DIAGNOSIS — E11.649 HYPOGLYCEMIA UNAWARENESS DUE TO TYPE 2 DIABETES MELLITUS: Primary | ICD-10-CM

## 2023-07-28 RX ORDER — GLUCAGON INJECTION, SOLUTION 1 MG/.2ML
1 INJECTION, SOLUTION SUBCUTANEOUS AS NEEDED
Qty: 1 ML | Refills: 3 | Status: SHIPPED | OUTPATIENT
Start: 2023-07-28

## 2023-07-28 NOTE — TELEPHONE ENCOUNTER
Patient is on insulin and does not have glucagon. She does have a history of critical lows. Will pend orders for MD.     [employer] no prior auth required

## 2023-07-28 NOTE — OUTREACH NOTE
AMBULATORY CASE MANAGEMENT NOTE    Name and Relationship of Patient/Support Person:  -     Care Coordination    Order for gvoke pended to Dr. Salas. Notified Dr. Talbot and he is supportive of gvoke order. He also requested that Alberta reduce her night time basal rate by 0.2 units. Called placed to Alberta and informed. She states that she thinks she knows how to make this change to her pump. Instructed her to reach out to Dr. Talbot office if she has any question with this.     Education Documentation  No documentation found.        Kizzy SUAREZ  Ambulatory Case Management    7/28/2023, 09:09 EDT

## 2023-07-28 NOTE — OUTREACH NOTE
[facility] End of Month Documentation    This Chronic Medical Management Care Plan for Shelley Leach, 68 y.o. female, has been monitored and managed; reviewed and a new plan of care implemented for the month of July.  A cumulative time of 31  minutes was spent on this patient record this month, including phone call with patient; chart review.    Regarding the patient's problems: has Acquired hypothyroidism; Hypercholesteremia; Hypoglycemia associated with type 2 diabetes mellitus; Uncontrolled type 2 diabetes mellitus with hyperglycemia; Vitamin D deficiency; Stenosis of right carotid artery s/p RIGHT CEA 2021; Osteoarthritis; Gastroesophageal reflux disease; CKD stage 3 due to type 2 diabetes mellitus; Hypertension, essential; B12 deficiency; Chronic kidney disease, stage 3b; Encounter for long-term (current) use of other medications; Hyperlipidemia, mixed; Incontinence of feces; Left carotid artery occlusion; Decreased libido; Adjustment disorder; Burn; Skin rash; Anxiety; Neuropathy; and Insect bite of left lower leg on their problem list., the following items were addressed: medical records; medications and any changes can be found within the plan section of the note.  A detailed listing of time spent for chronic care management is tracked within each outreach encounter.  Current medications include:  has a current medication list which includes the following prescription(s): aspirin, atorvastatin, biotin, true metrix air glucose meter, bupropion xl, carvedilol, cetirizine, clopidogrel, dexcom g6 sensor, dexcom g6 transmitter, b-12 compliance injection, doxycycline, estradiol, repatha, ezetimibe, famotidine, fenofibrate, fluconazole, gabapentin, gvoke hypopen 2-pack, glucose blood, insulin aspart, omnipod 5 g6 pod (gen 5), levothyroxine, lorazepam, losartan, multivitamin, myrbetriq, probiotic product, sertraline, and vitamin d3, and the following Facility-Administered Medications: chlorhexidine gluconate cloth,  chlorhexidine gluconate cloth, cyanocobalamin, and cyanocobalamin. and the patient is reported to be patient is compliant with medication protocol,  Medications are reported to be effective.  Regarding these diagnoses, referrals were made to the following provider(s):  n/a.  All notes on chart for PCP to review.    The patient was monitored remotely for blood pressure; activity level; blood glucose; medications.    The patient's physical needs include:  physical healthcare.     The patient's mental support needs include:  continued support    The patient's cognitive support needs include:  needs met    The patient's psychosocial support needs include:  continued support    The patient's functional needs include: physical healthcare    The patient's environmental needs include:  not applicable    Care Plan overall comments:  n/a    Refer to previous outreach notes for more information on the areas listed above.    Monthly Billing Diagnoses  (N18.32) Chronic kidney disease, stage 3b    (E78.2) Hyperlipidemia, mixed    (E11.65) Uncontrolled type 2 diabetes mellitus with hyperglycemia    Medications   Medications have been reconciled    Care Plan progress this month:      Recently Modified Care Plans Updates made since 6/27/2023 12:00 AM      No recently modified care plans.                Instructions   Patient was provided an electronic copy of care plan  CCM services were explained and offered and patient has accepted these services.  Patient has given their written consent to receive CCM services and understands that this includes the authorization of electronic communication of medical information with the other treating providers.  Patient understands that they may stop CCM services at any time and these changes will be effective at the end of the calendar month and will effectively revocate the agreement of CCM services.  Patient understands that only one practitioner can furnish and be paid for CCM services  during one calendar month.  Patient also understands that there may be co-payment or deductible fees in association with CCM services.  Patient will continue with at least monthly follow-up calls with the Ambulatory .    Kizzy SUAREZ  Ambulatory Case Management    7/28/2023, 10:49 EDT

## 2023-08-09 ENCOUNTER — TELEPHONE (OUTPATIENT)
Dept: ENDOCRINOLOGY | Facility: CLINIC | Age: 68
End: 2023-08-09
Payer: MEDICARE

## 2023-08-09 NOTE — TELEPHONE ENCOUNTER
Caller: Shelley Leach    Relationship to patient: Self    Best call back number: [phone]    Patient is needing: PATIENT'S OMNIPOD 5 STOPS WORKING FREQUENTLY. SHE HAS HAD TO CHANGE OUT 5 DAYS IN A ROW. SHE WOULD LIKE A CALL BACK TO ADVISE HOW TO PROCEED. STATED OKAY TO LEAVE VM.

## 2023-08-09 NOTE — TELEPHONE ENCOUNTER
Returned call and reviewed Mandi pt accidentally had turned on auto shut off. Instructed pt how to turn off setting. Issue should be resolved. Encouraged pt call back if she continues to have trouble.

## 2023-08-10 ENCOUNTER — DOCUMENTATION (OUTPATIENT)
Dept: ENDOCRINOLOGY | Facility: CLINIC | Age: 68
End: 2023-08-10
Payer: MEDICARE

## 2023-08-10 NOTE — PROGRESS NOTES
Pt called office, states she had to change her pod early again despite the settings change. Reviewed [facility]- appears to be a pod failure. Instructed pt to call Omnipod and reports error to receive replacement pod.

## 2023-08-14 ENCOUNTER — TELEPHONE (OUTPATIENT)
Dept: FAMILY MEDICINE CLINIC | Facility: CLINIC | Age: 68
End: 2023-08-14

## 2023-08-14 ENCOUNTER — OFFICE VISIT (OUTPATIENT)
Dept: FAMILY MEDICINE CLINIC | Facility: CLINIC | Age: 68
End: 2023-08-14
Payer: MEDICARE

## 2023-08-14 VITALS
BODY MASS INDEX: 30.68 KG/M2 | WEIGHT: 152.2 LBS | DIASTOLIC BLOOD PRESSURE: 72 MMHG | SYSTOLIC BLOOD PRESSURE: 128 MMHG | HEART RATE: 54 BPM | RESPIRATION RATE: 12 BRPM | OXYGEN SATURATION: 100 % | HEIGHT: 59 IN

## 2023-08-14 DIAGNOSIS — F41.9 ANXIETY: ICD-10-CM

## 2023-08-14 DIAGNOSIS — N18.32 CHRONIC KIDNEY DISEASE, STAGE 3B: ICD-10-CM

## 2023-08-14 DIAGNOSIS — G62.9 NEUROPATHY: ICD-10-CM

## 2023-08-14 DIAGNOSIS — I10 HYPERTENSION, ESSENTIAL: ICD-10-CM

## 2023-08-14 DIAGNOSIS — R42 DIZZINESS: Primary | ICD-10-CM

## 2023-08-14 DIAGNOSIS — F43.29 ADJUSTMENT DISORDER WITH OTHER SYMPTOM: ICD-10-CM

## 2023-08-14 DIAGNOSIS — Z79.899 ENCOUNTER FOR LONG-TERM (CURRENT) USE OF OTHER MEDICATIONS: ICD-10-CM

## 2023-08-14 DIAGNOSIS — S93.402A SPRAIN OF LEFT ANKLE, UNSPECIFIED LIGAMENT, INITIAL ENCOUNTER: ICD-10-CM

## 2023-08-14 LAB
POC AMPHETAMINES: NEGATIVE
POC BARBITURATES: NEGATIVE
POC BENZODIAZEPHINES: POSITIVE
POC COCAINE: NEGATIVE
POC METHADONE: NEGATIVE
POC METHAMPHETAMINE SCREEN URINE: NEGATIVE
POC OPIATES: NEGATIVE
POC OXYCODONE: NEGATIVE
POC PHENCYCLIDINE: NEGATIVE
POC PROPOXYPHENE: NEGATIVE
POC THC: NEGATIVE
POC TRICYCLIC ANTIDEPRESSANTS: NEGATIVE

## 2023-08-14 PROCEDURE — 3051F HG A1C>EQUAL 7.0%<8.0%: CPT | Performed by: FAMILY MEDICINE

## 2023-08-14 PROCEDURE — 3074F SYST BP LT 130 MM HG: CPT | Performed by: FAMILY MEDICINE

## 2023-08-14 PROCEDURE — 3078F DIAST BP <80 MM HG: CPT | Performed by: FAMILY MEDICINE

## 2023-08-14 PROCEDURE — 99214 OFFICE O/P EST MOD 30 MIN: CPT | Performed by: FAMILY MEDICINE

## 2023-08-14 PROCEDURE — 80305 DRUG TEST PRSMV DIR OPT OBS: CPT | Performed by: FAMILY MEDICINE

## 2023-08-14 RX ORDER — GABAPENTIN 300 MG/1
300 CAPSULE ORAL 3 TIMES DAILY
Qty: 90 CAPSULE | Refills: 2 | Status: SHIPPED | OUTPATIENT
Start: 2023-08-14

## 2023-08-14 RX ORDER — ZOSTER VACCINE RECOMBINANT, ADJUVANTED 50 MCG/0.5
KIT INTRAMUSCULAR
COMMUNITY
Start: 2023-07-03

## 2023-08-14 RX ORDER — BUPROPION HYDROCHLORIDE 150 MG/1
1 TABLET ORAL DAILY
COMMUNITY
Start: 2023-07-29

## 2023-08-14 NOTE — PROGRESS NOTES
Patient Name: Shelley Leach  : 1955   MRN: [id number]     Chief Complaint:    Chief Complaint   Patient presents with    Follow-up    Dizziness       History of Present Illness: Shelley Leach is a 68 y.o. female who is here today for follow up.  HPI        Review of Systems:   Review of Systems   Constitutional: Negative.    HENT: Negative.     Eyes: Negative.    Respiratory: Negative.     Cardiovascular: Negative.    Gastrointestinal: Negative.    Neurological: Negative.       Past Medical History:   Past Medical History:   Diagnosis Date    Bilateral carotid artery stenosis     Clostridioides difficile infection     15 YEARS AGO, TREATED    Dietary counseling     diet education-Abstracted from [place]    Dietary counseling and surveillance     Diverticulosis     Elevated cholesterol     GERD (gastroesophageal reflux disease)     Goiter     History of thyroid disorder     Thyroid problem-Abstracted from [place]    History of transfusion     NO REACTION    Hypertension     Hypothyroidism     Leaky heart valve     PONV (postoperative nausea and vomiting)     Type 2 diabetes mellitus, uncontrolled     Vitamin D deficiency        Past Surgical History:   Past Surgical History:   Procedure Laterality Date    BLEPHAROPLASTY Bilateral     CARDIAC CATHETERIZATION      NO INTERVENTION-20 YRS AGO    CAROTID ARTERY ANGIOPLASTY N/A     CAROTID ENDARTERECTOMY Right 2021    Procedure: CAROTID ENDARTERECTOMY WITH EEG RIGHT;  Surgeon: Javed Morris MD;  Location: [facility] OR;  Service: Vascular;  Laterality: Right;    CARPAL TUNNEL RELEASE Bilateral     CATARACT EXTRACTION      CHOLECYSTECTOMY      COLONOSCOPY      GASTRIC BYPASS      HYSTERECTOMY      INTERVENTIONAL RADIOLOGY PROCEDURE Bilateral 2021    Procedure: Carotid Cerebral Angiogram;  Surgeon: Sukumar Francis MD;  Location:  [facility];  Service: Interventional Radiology;  Laterality: Bilateral;    OVARIAN  CYST SURGERY      x 2    ROTATOR CUFF REPAIR Left     THYROIDECTOMY, PARTIAL      TONSILLECTOMY         Family History:   Family History   Problem Relation Age of Onset    Diabetes Mother     Anemia Mother     Cancer Mother     Hypertension Mother     Diabetes Father     Hypertension Father     Thyroid disease Father     Kidney disease Father     Cancer Father     Obesity Father     Kidney disease Maternal Grandfather     Hypertension Maternal Grandmother     Obesity Paternal Grandmother     Cancer Maternal Aunt     Cancer Paternal Aunt         Breast    Diabetes Paternal Aunt     Cancer Maternal Uncle         Bladder    Diabetes Maternal Uncle     Hypertension Maternal Uncle     Kidney disease Maternal Uncle     Obesity Maternal Uncle     Cancer Maternal Aunt         Breast    Diabetes Maternal Aunt     Diabetes Brother     Hypertension Brother     Kidney disease Brother     Obesity Brother     Diabetes Maternal Uncle     Diabetes Maternal Aunt        Social History:   Social History     Socioeconomic History    Marital status:     Number of children: 2   Tobacco Use    Smoking status: Never    Smokeless tobacco: Never   Vaping Use    Vaping Use: Never used   Substance and Sexual Activity    Alcohol use: Never    Drug use: Never    Sexual activity: Yes     Partners: Male     Birth control/protection: Hysterectomy       Medications:     Current Outpatient Medications:     aspirin 81 MG EC tablet, Take 1 tablet by mouth Daily., Disp: , Rfl:     atorvastatin (LIPITOR) 10 MG tablet, TAKE 1 TABLET EVERY NIGHT, Disp: 90 tablet, Rfl: 1    Biotin 80667 MCG tablet dispersible, Place 10,000 mcg on the tongue 2 (two) times a day., Disp: , Rfl:     Blood Glucose Monitoring Suppl (True Metrix Air Glucose Meter) w/Device kit, 1 each 4 (Four) Times a Day Before Meals & at Bedtime As Needed (prn)., Disp: 1 kit, Rfl: 0    buPROPion XL (Wellbutrin XL) 300 MG 24 hr tablet, Take 1 tablet by mouth Daily., Disp: 90 tablet, Rfl:  1    carvedilol (COREG) 6.25 MG tablet, Take 1 tablet by mouth Every 12 (Twelve) Hours., Disp: 180 tablet, Rfl: 1    cetirizine (zyrTEC) 10 MG tablet, TAKE 1 TABLET EVERY DAY, Disp: 90 tablet, Rfl: 0    clopidogrel (PLAVIX) 75 MG tablet, TAKE 1 TABLET EVERY DAY, Disp: 90 tablet, Rfl: 3    Continuous Blood Gluc Sensor (Dexcom G6 Sensor), Every 10 (Ten) Days., Disp: 9 each, Rfl: 3    Continuous Blood Gluc Transmit (Dexcom G6 Transmitter) misc, 1 each Every 3 (Three) Months., Disp: 1 each, Rfl: 3    Cyanocobalamin (B-12 Compliance Injection) 1000 MCG/ML kit, Inject  as directed., Disp: , Rfl:     estradiol (ESTRACE) 0.1 MG/GM vaginal cream, INSERT 1G VAGINALY TWICE A WEEK AT BEDTIME, Disp: , Rfl:     Evolocumab (Repatha) solution prefilled syringe injection, Inject 1 mL under the skin into the appropriate area as directed Every 14 (Fourteen) Days., Disp: 6 mL, Rfl: 5    ezetimibe (ZETIA) 10 MG tablet, TAKE 1 TABLET EVERY DAY, Disp: 90 tablet, Rfl: 1    famotidine (PEPCID) 40 MG tablet, , Disp: , Rfl:     fenofibrate (TRICOR) 145 MG tablet, Take 1 tablet by mouth Daily., Disp: , Rfl:     gabapentin (NEURONTIN) 300 MG capsule, Take 1 capsule by mouth 3 (Three) Times a Day., Disp: 90 capsule, Rfl: 2    Glucagon (Gvoke HypoPen 2-Pack) 1 MG/0.2ML solution auto-injector, Inject 1 dose under the skin into the appropriate area as directed As Needed (for hypoglycemia)., Disp: 1 mL, Rfl: 3    glucose blood (True Metrix Blood Glucose Test) test strip, Pt to test 3 to 4 times a day as needed to calculate continuous meter, Disp: 100 each, Rfl: 11    Insulin Aspart (novoLOG) 100 UNIT/ML injection, 100 units per day via pump, Disp: 90 mL, Rfl: 3    Insulin Disposable Pump (Omnipod 5 G6 Pod, Gen 5,) misc, 1 each Every 72 (Seventy-Two) Hours., Disp: 30 each, Rfl: 3    levothyroxine (SYNTHROID, LEVOTHROID) 100 MCG tablet, TAKE 1 TABLET EVERY DAY, Disp: 90 tablet, Rfl: 0    LORazepam (ATIVAN) 0.5 MG tablet, Take 1 tablet by mouth Every 8  (Eight) Hours As Needed for Anxiety., Disp: 30 tablet, Rfl: 2    losartan (COZAAR) 50 MG tablet, Take 1 tablet by mouth Daily., Disp: 90 tablet, Rfl: 1    multivitamin (THERAGRAN) tablet tablet, take 1 tablet by oral route  every day with food, Disp: , Rfl:     Myrbetriq 25 MG tablet sustained-release 24 hour 24 hr tablet, TAKE 1 TABLET DAILY,       SWALLOWING WHOLE WITH      WATER. DO NOT CRUSH, CHEW, AND/OR DIVIDE, Disp: 90 tablet, Rfl: 0    Probiotic Product (TRUBIOTICS PO), Take 1 capsule by mouth Daily., Disp: , Rfl:     sertraline (Zoloft) 25 MG tablet, Take 1 tablet by mouth Daily., Disp: 90 tablet, Rfl: 1    vitamin D3 125 MCG (5000 UT) capsule capsule, Take 2 capsules by mouth Daily., Disp: , Rfl:     buPROPion XL (WELLBUTRIN XL) 150 MG 24 hr tablet, Take 1 tablet by mouth Daily. (Patient not taking: Reported on 8/14/2023), Disp: , Rfl:     fluconazole (DIFLUCAN) 150 MG tablet, Take 1 tablet by mouth 1 (One) Time As Needed (YEAST). As needed (Patient not taking: Reported on 8/14/2023), Disp: , Rfl:     sertraline (ZOLOFT) 50 MG tablet, , Disp: , Rfl:     Shingrix 50 MCG/0.5ML reconstituted suspension, , Disp: , Rfl:     Current Facility-Administered Medications:     cyanocobalamin injection 1,000 mcg, 1,000 mcg, Intramuscular, Q28 Days, Tom Salas MD, 1,000 mcg at 03/30/23 1015    cyanocobalamin injection 1,000 mcg, 1,000 mcg, Intramuscular, Q28 Days, Tom Salas MD    Facility-Administered Medications Ordered in Other Visits:     Chlorhexidine Gluconate Cloth 2 % pads 1 application, 1 application , Topical, Q12H PRN, Sarah Wan PA-C    Chlorhexidine Gluconate Cloth 2 % pads 1 application, 1 application , Topical, Q12H PRN, Jeffery Sumner PA    Allergies:   Allergies   Allergen Reactions    Percocet [Oxycodone-Acetaminophen] Itching    Morphine Other (See Comments)     HYPOTENSION    Sulfamethoxazole-Trimethoprim Swelling    Penicillins Rash         Physical Exam:  Vital Signs:  "  Vitals:    08/14/23 0915   BP: 128/72   BP Location: Right arm   Patient Position: Sitting   Cuff Size: Adult   Pulse: 54   Resp: 12   SpO2: 100%   Weight: 69 kg (152 lb 3.2 oz)   Height: 149.9 cm (59.02\")   PainSc:   4   PainLoc: Ankle  Comment: rt knee pain     Body mass index is 30.72 kg/mý.     Physical Exam  Vitals and nursing note reviewed.   Constitutional:       Appearance: Normal appearance. She is normal weight.   HENT:      Head: Normocephalic and atraumatic.      Right Ear: Tympanic membrane, ear canal and external ear normal.      Left Ear: Tympanic membrane, ear canal and external ear normal.      Nose: Nose normal.      Mouth/Throat:      Mouth: Mucous membranes are dry.      Pharynx: Oropharynx is clear.   Eyes:      Extraocular Movements: Extraocular movements intact.      Conjunctiva/sclera: Conjunctivae normal.      Pupils: Pupils are equal, round, and reactive to light.   Cardiovascular:      Rate and Rhythm: Normal rate and regular rhythm.      Pulses: Normal pulses.      Heart sounds: Normal heart sounds.   Pulmonary:      Effort: Pulmonary effort is normal.      Breath sounds: Normal breath sounds.   Musculoskeletal:      Cervical back: Normal range of motion and neck supple.   Feet:      Comments: Patient had normal pulses in dorsalis pedis and posterior tibial bilaterally.  Patient had no abnormal callus or ulcer formation bilaterally.  Patient had good sharp and dull discrimination throughout all areas of the foot.  Sensation was intact.  Patient had normal diabetic foot exam.  Discussed proper foot wear and counseling of feet hygiene.      Left ankle was swollen.  Had full range of motion.  Neurovascular intact.     Neurological:      Mental Status: She is alert.       Procedures      Assessment/Plan:   Diagnoses and all orders for this visit:    1. Dizziness (Primary)  Assessment & Plan:  Patient has some dizziness when she turns her head to the left.  She denies any double vision loss " of vision numbness weakness.  I think she has vestibular dysfunction.  She cannot reproduce the motion to cause a dizziness.  I am going to send her to physical therapy for the Epley maneuver.  Return to clinic in 3 months.  I told her if she is worse come back in before then.    Orders:  -     Ambulatory Referral to Physical Therapy Evaluate and treat, Vestibular  -     Lipid Panel; Future  -     Comprehensive Metabolic Panel; Future  -     Vitamin B12; Future  -     Vitamin D,25-Hydroxy; Future  -     TSH Rfx On Abnormal To Free T4; Future  -     CBC & Differential; Future    2. Hypertension, essential  Assessment & Plan:  Discussed with patient to monitor their blood pressure and if systolic blood pressure goes above 140 or diastolic is above 90 to return to clinic.  Take medicines as directed, call for any problems, patient not having or any worrisome symptoms.        Orders:  -     Lipid Panel; Future  -     Comprehensive Metabolic Panel; Future  -     Vitamin B12; Future  -     Vitamin D,25-Hydroxy; Future  -     TSH Rfx On Abnormal To Free T4; Future  -     CBC & Differential; Future    3. Sprain of left ankle, unspecified ligament, initial encounter  Assessment & Plan:  Sprained her left ankle stepping in a gopher [place].  I will x-ray her ankle.  She is neurovascular intact distally.  She has full range of motion of her ankle.    Orders:  -     XR Ankle 3+ View Left; Future  -     Lipid Panel; Future  -     Comprehensive Metabolic Panel; Future  -     Vitamin B12; Future  -     Vitamin D,25-Hydroxy; Future  -     TSH Rfx On Abnormal To Free T4; Future  -     CBC & Differential; Future    4. Anxiety  Assessment & Plan:  Stable    Orders:  -     Lipid Panel; Future  -     Comprehensive Metabolic Panel; Future  -     Vitamin B12; Future  -     Vitamin D,25-Hydroxy; Future  -     TSH Rfx On Abnormal To Free T4; Future  -     CBC & Differential; Future    5. Neuropathy  Assessment & Plan:  Fill  gabapentin.    Orders:  -     gabapentin (NEURONTIN) 300 MG capsule; Take 1 capsule by mouth 3 (Three) Times a Day.  Dispense: 90 capsule; Refill: 2  -     Lipid Panel; Future  -     Comprehensive Metabolic Panel; Future  -     Vitamin B12; Future  -     Vitamin D,25-Hydroxy; Future  -     TSH Rfx On Abnormal To Free T4; Future  -     CBC & Differential; Future    6. Adjustment disorder with other symptom  Assessment & Plan:  Stable    Orders:  -     Lipid Panel; Future  -     Comprehensive Metabolic Panel; Future  -     Vitamin B12; Future  -     Vitamin D,25-Hydroxy; Future  -     TSH Rfx On Abnormal To Free T4; Future  -     CBC & Differential; Future    7. CKD 3b  Assessment & Plan:  Work in 3 months    Orders:  -     Lipid Panel; Future  -     Comprehensive Metabolic Panel; Future  -     Vitamin B12; Future  -     Vitamin D,25-Hydroxy; Future  -     TSH Rfx On Abnormal To Free T4; Future  -     CBC & Differential; Future             Follow Up:   No follow-ups on file.    Tom Salas MD  [facility]   Answers submitted by the patient for this visit:  Other (Submitted on 8/7/2023)  Please describe your symptoms.: Check up  Have you had these symptoms before?: No  How long have you been having these symptoms?: 1-4 days  Please list any medications you are currently taking for this condition.: None  Please describe any probable cause for these symptoms. : Na  Primary Reason for Visit (Submitted on 8/7/2023)  What is the primary reason for your visit?: Other

## 2023-08-14 NOTE — TELEPHONE ENCOUNTER
Caller: Shelley Leach    Relationship: Self    Best call back number: [phone]     Caller requesting test results: YES    What test was performed: XRAY    When was the test performed: 8/14    Where was the test performed: OFFICE    Additional notes: PATIENT CALLED FOR HER XRAY RESULTS

## 2023-08-14 NOTE — ASSESSMENT & PLAN NOTE
Sprained her left ankle stepping in a gopher hole [place] a golf [place].  I will x-ray her ankle.  She is neurovascular intact distally.  She has full range of motion of her ankle.

## 2023-08-14 NOTE — ASSESSMENT & PLAN NOTE
Patient has some dizziness when she turns her head to the left.  She denies any double vision loss of vision numbness weakness.  I think she has vestibular dysfunction.  She cannot reproduce the motion to cause a dizziness.  I am going to send her to physical therapy for the Epley maneuver.  Return to clinic in 3 months.  I told her if she is worse come back in before then.

## 2023-08-15 RX ORDER — LORAZEPAM 0.5 MG/1
0.5 TABLET ORAL EVERY 8 HOURS PRN
Qty: 30 TABLET | Refills: 2 | Status: SHIPPED | OUTPATIENT
Start: 2023-08-15

## 2023-08-28 ENCOUNTER — TELEPHONE (OUTPATIENT)
Dept: ENDOCRINOLOGY | Facility: CLINIC | Age: 68
End: 2023-08-28
Payer: MEDICARE

## 2023-08-28 RX ORDER — PROCHLORPERAZINE 25 MG/1
SUPPOSITORY RECTAL
Qty: 9 EACH | Refills: 3 | Status: SHIPPED | OUTPATIENT
Start: 2023-08-28

## 2023-08-28 NOTE — TELEPHONE ENCOUNTER
Patient called office, stated that she has had to use all 3 of her omnipod G6 sensors due to 2 of them not working. She stated she was able to get the 3rd one to connect, but that she is going to need some replacements so she can change it in 10 days. She was wanting to know if we had any samples, or if we could call in some replacements to [facility]. She would like a call back. Thank you!

## 2023-08-29 ENCOUNTER — PRIOR AUTHORIZATION (OUTPATIENT)
Dept: ENDOCRINOLOGY | Facility: CLINIC | Age: 68
End: 2023-08-29
Payer: MEDICARE

## 2023-09-15 ENCOUNTER — PATIENT OUTREACH (OUTPATIENT)
Dept: CASE MANAGEMENT | Facility: OTHER | Age: 68
End: 2023-09-15
Payer: MEDICARE

## 2023-09-15 DIAGNOSIS — N18.32 CHRONIC KIDNEY DISEASE, STAGE 3B: Primary | ICD-10-CM

## 2023-09-15 DIAGNOSIS — E11.65 UNCONTROLLED TYPE 2 DIABETES MELLITUS WITH HYPERGLYCEMIA: ICD-10-CM

## 2023-09-15 DIAGNOSIS — E78.2 HYPERLIPIDEMIA, MIXED: ICD-10-CM

## 2023-09-15 NOTE — OUTREACH NOTE
"AMBULATORY CASE MANAGEMENT NOTE    Name and Relationship of Patient/Support Person: Shelley Leach Elana - Basil    CCM Interim Update    Spoke with Alberta for CCM update. She is doing well. She had recent ankle strain and she states this is much better. She also had a period of dizziness. She went to [facility] and they adjusted her neck. She states this worked and has completely resolved.     BP at last visit was 128/72. She states that her blood pressures have been \"really good\".    She has not had any further lows until this morning. She was 54. She did not use her gvoke. She had food readily available and that brought her blood sugar up. She states that her blood sugar is running in the low 100's on average. She states that she does not have much of an appetite. She is eating 1 meal a day.  She has lost 10 pounds. She has not made diet changes or increased exercise.     She has had some issues with her omnipod and dexcom recently. Reminded her that if she loses a device early to call either omnipod or dexcom and they will send replacements to her.         Education Documentation  No documentation found.        Kizzy SUAREZ  Ambulatory Case Management    9/15/2023, 09:17 EDT  "

## 2023-09-25 ENCOUNTER — TELEPHONE (OUTPATIENT)
Dept: FAMILY MEDICINE CLINIC | Facility: CLINIC | Age: 68
End: 2023-09-25
Payer: MEDICARE

## 2023-09-25 NOTE — TELEPHONE ENCOUNTER
Caller: Shelley Leach    Relationship: Self    Best call back number: [phone]     What is the best time to reach you: ASAP    Who are you requesting to speak with (clinical staff, provider,  specific staff member): CLINICAL    What was the call regarding: PATIENT WOKE UP YESTERDAY MORNING 09/24/23 WITH HER EYE SWOLLEN SHUT. IT IS RED UNDERNEATH IT, HAS BUMPS AROUND HER EYE, ITS ITCHY AND NOW THE BUMS HAVE SPREAD TO OTHER PARTS OF HER FACE.    THIS HAPPENED IN THE PAST AND DR GREGORY AND PATIENT THOUGHT IT COULD HAD BEEN A SPIDER BITE, AND SHE WAS TREATED WITH AN ANTIBIOTIC. SHE ALSO NEEDS SOMETHING FOR THE ITCH.       PLEASE ADVISE AND CALL PATIENT AS WE DO NOT HAVE ANY APPOINTMENT LEFT FOR THE DAY     [address] [phone] [facility] [phone] FX

## 2023-09-28 ENCOUNTER — OFFICE VISIT (OUTPATIENT)
Dept: ENDOCRINOLOGY | Facility: CLINIC | Age: 68
End: 2023-09-28
Payer: MEDICARE

## 2023-09-28 ENCOUNTER — PATIENT OUTREACH (OUTPATIENT)
Dept: CASE MANAGEMENT | Facility: OTHER | Age: 68
End: 2023-09-28
Payer: MEDICARE

## 2023-09-28 VITALS
DIASTOLIC BLOOD PRESSURE: 66 MMHG | HEIGHT: 59 IN | SYSTOLIC BLOOD PRESSURE: 114 MMHG | HEART RATE: 54 BPM | OXYGEN SATURATION: 95 % | BODY MASS INDEX: 30.6 KG/M2 | WEIGHT: 151.8 LBS

## 2023-09-28 DIAGNOSIS — N18.32 CHRONIC KIDNEY DISEASE, STAGE 3B: Primary | ICD-10-CM

## 2023-09-28 DIAGNOSIS — E11.65 UNCONTROLLED TYPE 2 DIABETES MELLITUS WITH HYPERGLYCEMIA: ICD-10-CM

## 2023-09-28 DIAGNOSIS — E11.65 UNCONTROLLED TYPE 2 DIABETES MELLITUS WITH HYPERGLYCEMIA: Primary | ICD-10-CM

## 2023-09-28 DIAGNOSIS — E78.2 HYPERLIPIDEMIA, MIXED: ICD-10-CM

## 2023-09-28 LAB
EXPIRATION DATE: NORMAL
EXPIRATION DATE: NORMAL
GLUCOSE BLDC GLUCOMTR-MCNC: 103 MG/DL (ref 70–130)
HBA1C MFR BLD: 7.6 %
Lab: NORMAL
Lab: NORMAL

## 2023-09-28 RX ORDER — CEPHALEXIN 500 MG/1
500 CAPSULE ORAL 4 TIMES DAILY
Qty: 40 CAPSULE | Refills: 0 | Status: SHIPPED | OUTPATIENT
Start: 2023-09-28

## 2023-09-28 RX ORDER — CARVEDILOL 6.25 MG/1
TABLET ORAL
Qty: 180 TABLET | Refills: 0 | Status: SHIPPED | OUTPATIENT
Start: 2023-09-28

## 2023-09-28 NOTE — TELEPHONE ENCOUNTER
Talk to patient.  She has a cellulitis.  Her eye itself is not affected.  I am give her some Keflex and I told her if it gets worse or her vision gets a patient needed to go emergency room.

## 2023-09-28 NOTE — PROGRESS NOTES
"     Office Note      Date: 2023  Patient Name: Shelley Leach  MRN: [id number]  : 1955    Chief Complaint   Patient presents with    Diabetes     Uncontrolled type 2 diabetes mellitus with hyperglycemia         History of Present Illness:   Shelley Leach is a 68 y.o. female who presents for Diabetes type 2.   Current RX  insulin in op 5    Bg is checked 288 times per day with dexcom.  Insulin doses are adjusted frequently based upon the readings.  Patient has occasional hypoglycemia.  Patient has been using the system during the last 3 months.       Last A1c:  Hemoglobin A1C   Date Value Ref Range Status   2023 7.6 % Final   2021 10.40 (H) 4.80 - 5.60 % Final       Changes in health since last visit: none  The last 2 weeks of dexcom data are reviewed.  1 % are low  63 % are in range  20 % are 180-250  16 % are >250  The glycemic pattern shows: . Post meal hyperglycemia  Last eye exam up to date .    Subjective              Review of Systems:   Review of Systems   Eyes: Negative.    Respiratory: Negative.       The following portions of the patient's history were reviewed and updated as appropriate: allergies, current medications, past family history, past medical history, past social history, past surgical history, and problem list.    Objective     Visit Vitals  /66 (BP Location: Left arm, Patient Position: Sitting, Cuff Size: Adult)   Pulse 54   Ht 149.9 cm (59\")   Wt 68.9 kg (151 lb 12.8 oz)   SpO2 95%   BMI 30.66 kg/m²           Physical Exam:  Physical Exam  Vitals reviewed.   Constitutional:       Appearance: Normal appearance.   Neurological:      Mental Status: She is alert.   Psychiatric:         Mood and Affect: Mood normal.         Behavior: Behavior normal.         Thought Content: Thought content normal.         Judgment: Judgment normal.        Assessment / Plan      Assessment & Plan:  Problem List Items Addressed This Visit          Other    Uncontrolled " type 2 diabetes mellitus with hyperglycemia - Primary    Current Assessment & Plan     Improved.    Based upon the dexcom data, I changed her max basal and her correction threshold. This should get her TIR to about 75 %          Relevant Medications    Insulin Aspart (novoLOG) 100 UNIT/ML injection    Blood Glucose Monitoring Suppl (True Metrix Air Glucose Meter) w/Device kit    Glucagon (Gvoke HypoPen 2-Pack) 1 MG/0.2ML solution auto-injector    Other Relevant Orders    POC Glycosylated Hemoglobin (Hb A1C) (Completed)    POC Glucose, Blood (Completed)        Sonu Talbot MD   09/28/2023

## 2023-09-28 NOTE — ASSESSMENT & PLAN NOTE
Improved.    Based upon the dexcom data, I changed her max basal and her correction threshold. This should get her TIR to about 75 %

## 2023-09-28 NOTE — OUTREACH NOTE
[facility] End of Month Documentation    This Chronic Medical Management Care Plan for Shelley Leach, 68 y.o. female, has been monitored and managed; reviewed and a new plan of care implemented for the month of September.  A cumulative time of 21  minutes was spent on this patient record this month, including phone call with patient; chart review.    Regarding the patient's problems: has Acquired hypothyroidism; Hypercholesteremia; Hypoglycemia associated with type 2 diabetes mellitus; Uncontrolled type 2 diabetes mellitus with hyperglycemia; Vitamin D deficiency; Stenosis of right carotid artery s/p RIGHT CEA 2021; Osteoarthritis; Gastroesophageal reflux disease; CKD stage 3 due to type 2 diabetes mellitus; Hypertension, essential; B12 deficiency; Chronic kidney disease, stage 3b; Encounter for long-term (current) use of other medications; Hyperlipidemia, mixed; Incontinence of feces; Left carotid artery occlusion; Decreased libido; Adjustment disorder; Burn; Skin rash; Anxiety; Neuropathy; Insect bite of left lower leg; Sprain of left ankle; and Dizziness on their problem list., the following items were addressed: medical records; medications and any changes can be found within the plan section of the note.  A detailed listing of time spent for chronic care management is tracked within each outreach encounter.  Current medications include:  has a current medication list which includes the following prescription(s): aspirin, atorvastatin, biotin, true metrix air glucose meter, bupropion xl, bupropion xl, carvedilol, cetirizine, clopidogrel, dexcom g6 sensor, dexcom g6 transmitter, b-12 compliance injection, estradiol, repatha, ezetimibe, famotidine, fenofibrate, fluconazole, gabapentin, gvoke hypopen 2-pack, glucose blood, insulin aspart, omnipod 5 g6 pod (gen 5), levothyroxine, lorazepam, losartan, multivitamin, myrbetriq, probiotic product, sertraline, sertraline, shingrix, and vitamin d3, and the following  Facility-Administered Medications: chlorhexidine gluconate cloth, chlorhexidine gluconate cloth, cyanocobalamin, and cyanocobalamin. and the patient is reported to be patient is compliant with medication protocol,  Medications are reported to be effective.  Regarding these diagnoses, referrals were made to the following provider(s):  n/a.  All notes on chart for PCP to review.    The patient was monitored remotely for blood pressure; activity level; blood glucose; medications; weight.    The patient's physical needs include:  physical healthcare.     The patient's mental support needs include:  continued support    The patient's cognitive support needs include:  needs met    The patient's psychosocial support needs include:  continued support    The patient's functional needs include: physical healthcare    The patient's environmental needs include:  not applicable    Care Plan overall comments:  n/a    Refer to previous outreach notes for more information on the areas listed above.    Monthly Billing Diagnoses  (N18.32) Chronic kidney disease, stage 3b    (E78.2) Hyperlipidemia, mixed    (E11.65) Uncontrolled type 2 diabetes mellitus with hyperglycemia    Medications   Medications have been reconciled    Care Plan progress this month:      Recently Modified Care Plans Updates made since 8/28/2023 12:00 AM      No recently modified care plans.            Instructions   Patient was provided an electronic copy of care plan  CCM services were explained and offered and patient has accepted these services.  Patient has given their written consent to receive CCM services and understands that this includes the authorization of electronic communication of medical information with the other treating providers.  Patient understands that they may stop CCM services at any time and these changes will be effective at the end of the calendar month and will effectively revocate the agreement of CCM services.  Patient understands  that only one practitioner can furnish and be paid for CCM services during one calendar month.  Patient also understands that there may be co-payment or deductible fees in association with CCM services.  Patient will continue with at least monthly follow-up calls with the Ambulatory .    Kizzy SUAREZ  Ambulatory Case Management    9/28/2023, 09:28 EDT

## 2023-10-03 DIAGNOSIS — F41.9 ANXIETY: ICD-10-CM

## 2023-10-03 DIAGNOSIS — G62.9 NEUROPATHY: ICD-10-CM

## 2023-10-03 RX ORDER — LORAZEPAM 0.5 MG/1
0.5 TABLET ORAL EVERY 8 HOURS PRN
Qty: 30 TABLET | Refills: 2 | Status: CANCELLED | OUTPATIENT
Start: 2023-10-03

## 2023-10-03 RX ORDER — GABAPENTIN 300 MG/1
300 CAPSULE ORAL 3 TIMES DAILY
Qty: 90 CAPSULE | Refills: 2 | Status: CANCELLED | OUTPATIENT
Start: 2023-10-03

## 2023-10-03 RX ORDER — LORAZEPAM 0.5 MG/1
0.5 TABLET ORAL EVERY 8 HOURS PRN
Qty: 30 TABLET | Refills: 2 | Status: SHIPPED | OUTPATIENT
Start: 2023-10-03 | End: 2023-10-04 | Stop reason: SDUPTHER

## 2023-10-03 NOTE — TELEPHONE ENCOUNTER
Caller: [facility] - [place], [place] - [address] - [phone] [facility] [phone] FX    Relationship: Pharmacy    Best call back number: [phone]     Requested Prescriptions:   Requested Prescriptions     Pending Prescriptions Disp Refills    LORazepam (ATIVAN) 0.5 MG tablet 30 tablet 2     Sig: Take 1 tablet by mouth Every 8 (Eight) Hours As Needed for Anxiety.      BUPROPION 300 MG-NOT ON CURRENT MED LIST    SERTRALINE 25 MG-NOT ON CURRENT MED LIST    Pharmacy where request should be sent: [facility] - [place], [place] - [address] - [phone]  - [phone] FX     Last office visit with prescribing clinician: 8/14/2023   Last telemedicine visit with prescribing clinician: Visit date not found   Next office visit with prescribing clinician: 11/14/2023     Additional details provided by patient: [facility] STATED THEY SENT A FAX REQUESTING THESE MEDICATIONS ON 9/28/23        Would you like a call back once the refill request has been completed: [] Yes [x] No    If the office needs to give you a call back, can they leave a voicemail: [] Yes [x] No    Bryce Omalley Rep   10/03/23 09:58 EDT

## 2023-10-04 DIAGNOSIS — F41.9 ANXIETY: ICD-10-CM

## 2023-10-04 DIAGNOSIS — G62.9 NEUROPATHY: ICD-10-CM

## 2023-10-04 RX ORDER — BUPROPION HYDROCHLORIDE 300 MG/1
300 TABLET ORAL DAILY
COMMUNITY
End: 2023-10-04 | Stop reason: SDUPTHER

## 2023-10-04 RX ORDER — LORAZEPAM 0.5 MG/1
0.5 TABLET ORAL EVERY 8 HOURS PRN
Qty: 30 TABLET | Refills: 2 | Status: SHIPPED | OUTPATIENT
Start: 2023-10-04

## 2023-10-04 RX ORDER — BUPROPION HYDROCHLORIDE 300 MG/1
300 TABLET ORAL DAILY
Qty: 90 TABLET | Refills: 1 | Status: SHIPPED | OUTPATIENT
Start: 2023-10-04

## 2023-10-04 RX ORDER — GABAPENTIN 300 MG/1
300 CAPSULE ORAL 3 TIMES DAILY
Qty: 90 CAPSULE | Refills: 2 | Status: SHIPPED | OUTPATIENT
Start: 2023-10-04

## 2023-10-26 RX ORDER — LOSARTAN POTASSIUM 50 MG/1
50 TABLET ORAL DAILY
Qty: 90 TABLET | Refills: 0 | Status: SHIPPED | OUTPATIENT
Start: 2023-10-26

## 2023-10-27 RX ORDER — SERTRALINE HYDROCHLORIDE 25 MG/1
25 TABLET, FILM COATED ORAL DAILY
Qty: 90 TABLET | Refills: 1 | OUTPATIENT
Start: 2023-10-27

## 2023-11-07 ENCOUNTER — LAB (OUTPATIENT)
Dept: FAMILY MEDICINE CLINIC | Facility: CLINIC | Age: 68
End: 2023-11-07
Payer: MEDICARE

## 2023-11-07 DIAGNOSIS — G62.9 NEUROPATHY: ICD-10-CM

## 2023-11-07 DIAGNOSIS — N18.32 CHRONIC KIDNEY DISEASE, STAGE 3B: ICD-10-CM

## 2023-11-07 DIAGNOSIS — I10 HYPERTENSION, ESSENTIAL: ICD-10-CM

## 2023-11-07 DIAGNOSIS — F41.9 ANXIETY: ICD-10-CM

## 2023-11-07 DIAGNOSIS — S93.402A SPRAIN OF LEFT ANKLE, UNSPECIFIED LIGAMENT, INITIAL ENCOUNTER: ICD-10-CM

## 2023-11-07 DIAGNOSIS — R42 DIZZINESS: ICD-10-CM

## 2023-11-07 DIAGNOSIS — F43.29 ADJUSTMENT DISORDER WITH OTHER SYMPTOM: ICD-10-CM

## 2023-11-08 LAB
25(OH)D3+25(OH)D2 SERPL-MCNC: 73.6 NG/ML (ref 30–100)
ALBUMIN SERPL-MCNC: 3.8 G/DL (ref 3.9–4.9)
ALBUMIN/GLOB SERPL: 1.5 {RATIO} (ref 1.2–2.2)
ALP SERPL-CCNC: 155 IU/L (ref 44–121)
ALT SERPL-CCNC: 35 IU/L (ref 0–32)
AST SERPL-CCNC: 46 IU/L (ref 0–40)
BASOPHILS # BLD AUTO: 0.1 X10E3/UL (ref 0–0.2)
BASOPHILS NFR BLD AUTO: 1 %
BILIRUB SERPL-MCNC: 0.5 MG/DL (ref 0–1.2)
BUN SERPL-MCNC: 15 MG/DL (ref 8–27)
BUN/CREAT SERPL: 9 (ref 12–28)
CALCIUM SERPL-MCNC: 9.4 MG/DL (ref 8.7–10.3)
CHLORIDE SERPL-SCNC: 105 MMOL/L (ref 96–106)
CHOLEST SERPL-MCNC: 89 MG/DL (ref 100–199)
CO2 SERPL-SCNC: 24 MMOL/L (ref 20–29)
CREAT SERPL-MCNC: 1.6 MG/DL (ref 0.57–1)
EGFRCR SERPLBLD CKD-EPI 2021: 35 ML/MIN/1.73
EOSINOPHIL # BLD AUTO: 0.2 X10E3/UL (ref 0–0.4)
EOSINOPHIL NFR BLD AUTO: 3 %
ERYTHROCYTE [DISTWIDTH] IN BLOOD BY AUTOMATED COUNT: 12.3 % (ref 11.7–15.4)
GLOBULIN SER CALC-MCNC: 2.5 G/DL (ref 1.5–4.5)
GLUCOSE SERPL-MCNC: 100 MG/DL (ref 70–99)
HCT VFR BLD AUTO: 36.9 % (ref 34–46.6)
HDLC SERPL-MCNC: 52 MG/DL
HGB BLD-MCNC: 12.2 G/DL (ref 11.1–15.9)
IMM GRANULOCYTES # BLD AUTO: 0 X10E3/UL (ref 0–0.1)
IMM GRANULOCYTES NFR BLD AUTO: 0 %
LDLC SERPL CALC-MCNC: 17 MG/DL (ref 0–99)
LYMPHOCYTES # BLD AUTO: 3.2 X10E3/UL (ref 0.7–3.1)
LYMPHOCYTES NFR BLD AUTO: 45 %
MCH RBC QN AUTO: 31.4 PG (ref 26.6–33)
MCHC RBC AUTO-ENTMCNC: 33.1 G/DL (ref 31.5–35.7)
MCV RBC AUTO: 95 FL (ref 79–97)
MONOCYTES # BLD AUTO: 0.6 X10E3/UL (ref 0.1–0.9)
MONOCYTES NFR BLD AUTO: 8 %
NEUTROPHILS # BLD AUTO: 3 X10E3/UL (ref 1.4–7)
NEUTROPHILS NFR BLD AUTO: 43 %
PLATELET # BLD AUTO: 232 X10E3/UL (ref 150–450)
POTASSIUM SERPL-SCNC: 4.7 MMOL/L (ref 3.5–5.2)
PROT SERPL-MCNC: 6.3 G/DL (ref 6–8.5)
RBC # BLD AUTO: 3.89 X10E6/UL (ref 3.77–5.28)
SODIUM SERPL-SCNC: 143 MMOL/L (ref 134–144)
TRIGL SERPL-MCNC: 110 MG/DL (ref 0–149)
TSH SERPL DL<=0.005 MIU/L-ACNC: 3.21 UIU/ML (ref 0.45–4.5)
VIT B12 SERPL-MCNC: 1244 PG/ML (ref 232–1245)
VLDLC SERPL CALC-MCNC: 20 MG/DL (ref 5–40)
WBC # BLD AUTO: 7.1 X10E3/UL (ref 3.4–10.8)

## 2023-11-13 RX ORDER — CETIRIZINE HYDROCHLORIDE 10 MG/1
TABLET ORAL
Qty: 90 TABLET | Refills: 10 | Status: SHIPPED | OUTPATIENT
Start: 2023-11-13

## 2023-11-13 RX ORDER — MIRABEGRON 25 MG/1
TABLET, FILM COATED, EXTENDED RELEASE ORAL
Qty: 90 TABLET | Refills: 10 | Status: SHIPPED | OUTPATIENT
Start: 2023-11-13

## 2023-11-13 RX ORDER — EVOLOCUMAB 140 MG/ML
INJECTION, SOLUTION SUBCUTANEOUS
Qty: 6 ML | Refills: 10 | Status: SHIPPED | OUTPATIENT
Start: 2023-11-13

## 2023-11-14 ENCOUNTER — OFFICE VISIT (OUTPATIENT)
Dept: FAMILY MEDICINE CLINIC | Facility: CLINIC | Age: 68
End: 2023-11-14
Payer: MEDICARE

## 2023-11-14 VITALS
BODY MASS INDEX: 30.04 KG/M2 | RESPIRATION RATE: 16 BRPM | WEIGHT: 149 LBS | SYSTOLIC BLOOD PRESSURE: 128 MMHG | DIASTOLIC BLOOD PRESSURE: 72 MMHG | HEART RATE: 69 BPM | OXYGEN SATURATION: 99 % | HEIGHT: 59 IN

## 2023-11-14 DIAGNOSIS — E78.2 HYPERLIPIDEMIA, MIXED: ICD-10-CM

## 2023-11-14 DIAGNOSIS — E55.9 VITAMIN D DEFICIENCY: ICD-10-CM

## 2023-11-14 DIAGNOSIS — E03.9 ACQUIRED HYPOTHYROIDISM: ICD-10-CM

## 2023-11-14 DIAGNOSIS — I65.22 LEFT CAROTID ARTERY OCCLUSION: ICD-10-CM

## 2023-11-14 DIAGNOSIS — I10 HYPERTENSION, ESSENTIAL: Primary | ICD-10-CM

## 2023-11-14 DIAGNOSIS — N18.32 CHRONIC KIDNEY DISEASE, STAGE 3B: ICD-10-CM

## 2023-11-14 DIAGNOSIS — R79.89 ELEVATED LIVER FUNCTION TESTS: ICD-10-CM

## 2023-11-14 DIAGNOSIS — G62.9 NEUROPATHY: ICD-10-CM

## 2023-11-14 RX ORDER — ALPRAZOLAM 0.5 MG/1
0.5 TABLET ORAL NIGHTLY PRN
COMMUNITY

## 2023-11-14 RX ORDER — AMLODIPINE BESYLATE 5 MG/1
5 TABLET ORAL DAILY
COMMUNITY
End: 2023-11-14

## 2023-11-14 RX ORDER — LOSARTAN POTASSIUM 100 MG/1
100 TABLET ORAL DAILY
COMMUNITY

## 2023-11-14 NOTE — ASSESSMENT & PLAN NOTE
GFR is 35.Patient is instructed to not take any NSAIDs.  Medicines as directed.  Stay well-hydrated.

## 2023-11-14 NOTE — PROGRESS NOTES
Patient Name: Shelley Leach  : 1955   MRN: [id number]     Chief Complaint:    Chief Complaint   Patient presents with    Follow-up       History of Present Illness: Shelley Leach is a 68 y.o. female who is here today for follow up on neuropathy and BW  HPI        Review of Systems:   Review of Systems   Endocrine: Positive for polydipsia. Negative for polyuria.   Neurological:  Positive for tremors. Negative for speech difficulty and headaches.   Psychiatric/Behavioral:  Negative for confusion.         Past Medical History:   Past Medical History:   Diagnosis Date    Bilateral carotid artery stenosis     Clostridioides difficile infection     15 YEARS AGO, TREATED    Dietary counseling     diet education-Abstracted from [facility]    Dietary counseling and surveillance     Diverticulosis     Elevated cholesterol     GERD (gastroesophageal reflux disease)     Goiter     History of thyroid disorder     Thyroid problem-Abstracted from [facility]    History of transfusion     NO REACTION    Hypertension     Hypothyroidism     Leaky heart valve     PONV (postoperative nausea and vomiting)     Type 2 diabetes mellitus, uncontrolled     Vitamin D deficiency        Past Surgical History:   Past Surgical History:   Procedure Laterality Date    BLEPHAROPLASTY Bilateral     CARDIAC CATHETERIZATION      NO INTERVENTION-20 YRS AGO    CAROTID ARTERY ANGIOPLASTY N/A     CAROTID ENDARTERECTOMY Right 2021    Procedure: CAROTID ENDARTERECTOMY WITH EEG RIGHT;  Surgeon: Javed Morris MD;  Location: [facility] OR;  Service: Vascular;  Laterality: Right;    CARPAL TUNNEL RELEASE Bilateral     CATARACT EXTRACTION      CHOLECYSTECTOMY      COLONOSCOPY      GASTRIC BYPASS      HYSTERECTOMY      INTERVENTIONAL RADIOLOGY PROCEDURE Bilateral 2021    Procedure: Carotid Cerebral Angiogram;  Surgeon: Sukumar Francis MD;  Location:  [facility];  Service: Interventional Radiology;  Laterality:  Bilateral;    OVARIAN CYST SURGERY      x 2    ROTATOR CUFF REPAIR Left     THYROIDECTOMY, PARTIAL      TONSILLECTOMY         Family History:   Family History   Problem Relation Age of Onset    Diabetes Mother     Anemia Mother     Cancer Mother     Hypertension Mother     Diabetes Father     Hypertension Father     Thyroid disease Father     Kidney disease Father     Cancer Father     Obesity Father     Kidney disease Maternal Grandfather     Hypertension Maternal Grandmother     Obesity Paternal Grandmother     Cancer Maternal Aunt     Cancer Paternal Aunt         Breast    Diabetes Paternal Aunt     Cancer Maternal Uncle         Bladder    Diabetes Maternal Uncle     Hypertension Maternal Uncle     Kidney disease Maternal Uncle     Obesity Maternal Uncle     Cancer Maternal Aunt         Breast    Diabetes Maternal Aunt     Diabetes Brother     Hypertension Brother     Kidney disease Brother     Obesity Brother     Diabetes Maternal Uncle     Diabetes Maternal Aunt        Social History:   Social History     Socioeconomic History    Marital status:     Number of children: 2   Tobacco Use    Smoking status: Never    Smokeless tobacco: Never   Vaping Use    Vaping Use: Never used   Substance and Sexual Activity    Alcohol use: Never    Drug use: Never    Sexual activity: Yes     Partners: Male     Birth control/protection: Hysterectomy       Medications:     Current Outpatient Medications:     ALPRAZolam (XANAX) 0.5 MG tablet, Take 1 tablet by mouth At Night As Needed., Disp: , Rfl:     aspirin 81 MG EC tablet, Take 1 tablet by mouth Daily., Disp: , Rfl:     atorvastatin (LIPITOR) 10 MG tablet, TAKE 1 TABLET EVERY NIGHT, Disp: 90 tablet, Rfl: 1    Biotin 41648 MCG tablet dispersible, Place 10,000 mcg on the tongue 2 (two) times a day., Disp: , Rfl:     Blood Glucose Monitoring Suppl (True Metrix Air Glucose Meter) w/Device kit, 1 each 4 (Four) Times a Day Before Meals & at Bedtime As Needed (prn)., Disp: 1  kit, Rfl: 0    buPROPion XL (WELLBUTRIN XL) 300 MG 24 hr tablet, Take 1 tablet by mouth Daily., Disp: 90 tablet, Rfl: 1    cetirizine (zyrTEC) 10 MG tablet, TAKE 1 TABLET EVERY DAY, Disp: 90 tablet, Rfl: 10    clopidogrel (PLAVIX) 75 MG tablet, TAKE 1 TABLET EVERY DAY, Disp: 90 tablet, Rfl: 3    Continuous Blood Gluc Sensor (Dexcom G6 Sensor), Every 10 (Ten) Days. Every ten days, Disp: 9 each, Rfl: 3    Continuous Blood Gluc Transmit (Dexcom G6 Transmitter) misc, 1 each Every 3 (Three) Months., Disp: 1 each, Rfl: 3    Cyanocobalamin (B-12 Compliance Injection) 1000 MCG/ML kit, Inject  as directed., Disp: , Rfl:     estradiol (ESTRACE) 0.1 MG/GM vaginal cream, INSERT 1G VAGINALY TWICE A WEEK AT BEDTIME, Disp: , Rfl:     ezetimibe (ZETIA) 10 MG tablet, TAKE 1 TABLET EVERY DAY, Disp: 90 tablet, Rfl: 1    famotidine (PEPCID) 40 MG tablet, , Disp: , Rfl:     gabapentin (NEURONTIN) 300 MG capsule, Take 1 capsule by mouth 3 (Three) Times a Day., Disp: 90 capsule, Rfl: 2    Glucagon (Gvoke HypoPen 2-Pack) 1 MG/0.2ML solution auto-injector, Inject 1 dose under the skin into the appropriate area as directed As Needed (for hypoglycemia)., Disp: 1 mL, Rfl: 3    glucose blood (True Metrix Blood Glucose Test) test strip, Pt to test 3 to 4 times a day as needed to calculate continuous meter, Disp: 100 each, Rfl: 11    Insulin Aspart (novoLOG) 100 UNIT/ML injection, 100 units per day via pump, Disp: 90 mL, Rfl: 3    Insulin Disposable Pump (Omnipod 5 G6 Pod, Gen 5,) misc, 1 each Every 72 (Seventy-Two) Hours., Disp: 30 each, Rfl: 3    levothyroxine (SYNTHROID, LEVOTHROID) 100 MCG tablet, TAKE 1 TABLET EVERY DAY, Disp: 90 tablet, Rfl: 0    losartan (COZAAR) 100 MG tablet, Take 1 tablet by mouth Daily., Disp: , Rfl:     multivitamin (THERAGRAN) tablet tablet, take 1 tablet by oral route  every day with food, Disp: , Rfl:     Myrbetriq 25 MG tablet sustained-release 24 hour 24 hr tablet, TAKE 1 TABLET DAILY, SWALLOWING WHOLE WITH  "WATER. DO NOT CRUSH, CHEW, AND/OR DIVIDE, Disp: 90 tablet, Rfl: 10    Probiotic Product (TRUBIOTICS PO), Take 1 capsule by mouth Daily., Disp: , Rfl:     Repatha solution prefilled syringe injection, INJECT 1 ML UNDER THE SKIN INTO THE APPROPRIATE AREA AS DIRECTED EVERY 14  DAYS., Disp: 6 mL, Rfl: 10    vitamin D3 125 MCG (5000 UT) capsule capsule, Take 2 capsules by mouth Daily., Disp: , Rfl:     cephalexin (Keflex) 500 MG capsule, Take 1 capsule by mouth 4 (Four) Times a Day., Disp: 40 capsule, Rfl: 0    losartan (COZAAR) 50 MG tablet, TAKE 1 TABLET EVERY DAY, Disp: 90 tablet, Rfl: 0    Current Facility-Administered Medications:     cyanocobalamin injection 1,000 mcg, 1,000 mcg, Intramuscular, Q28 Days, Tom Salas MD, 1,000 mcg at 03/30/23 1015    cyanocobalamin injection 1,000 mcg, 1,000 mcg, Intramuscular, Q28 Days, Tom Salas MD    Facility-Administered Medications Ordered in Other Visits:     Chlorhexidine Gluconate Cloth 2 % pads 1 application, 1 application , Topical, Q12H PRN, Sarah Wan PA-C    Chlorhexidine Gluconate Cloth 2 % pads 1 application, 1 application , Topical, Q12H PRN, Jeffery Sumner PA    Allergies:   Allergies   Allergen Reactions    Percocet [Oxycodone-Acetaminophen] Itching    Morphine Other (See Comments)     HYPOTENSION    Sulfamethoxazole-Trimethoprim Swelling    Penicillins Rash         Physical Exam:  Vital Signs:   Vitals:    11/14/23 0800   BP: 128/72   BP Location: Left arm   Patient Position: Sitting   Cuff Size: Adult   Pulse: 69   Resp: 16   SpO2: 99%   Weight: 67.6 kg (149 lb)   Height: 149.9 cm (59.02\")     Body mass index is 30.08 kg/m².     Physical Exam  Vitals and nursing note reviewed.   Constitutional:       Appearance: Normal appearance. She is normal weight.   HENT:      Head: Normocephalic and atraumatic.      Right Ear: Tympanic membrane, ear canal and external ear normal.      Left Ear: Tympanic membrane, ear canal and external ear normal.     "  Nose: Nose normal.      Mouth/Throat:      Mouth: Mucous membranes are dry.      Pharynx: Oropharynx is clear.   Eyes:      Extraocular Movements: Extraocular movements intact.      Conjunctiva/sclera: Conjunctivae normal.      Pupils: Pupils are equal, round, and reactive to light.   Cardiovascular:      Rate and Rhythm: Normal rate and regular rhythm.      Pulses: Normal pulses.      Heart sounds: Normal heart sounds.   Pulmonary:      Effort: Pulmonary effort is normal.      Breath sounds: Normal breath sounds.   Musculoskeletal:      Cervical back: Normal range of motion and neck supple.   Feet:      Comments:      Neurological:      Mental Status: She is alert.         Procedures      Assessment/Plan:   Diagnoses and all orders for this visit:    1. Hypertension, essential (Primary)  Assessment & Plan:  Discussed with patient to monitor their blood pressure and if systolic blood pressure goes above 140 or diastolic is above 90 to return to clinic.  Take medicines as directed, call for any problems, patient not having or any worrisome symptoms.          2. Hyperlipidemia, mixed  Assessment & Plan:  HDL 52.  LDL 17.  Recheck in 3 months.      3. Acquired hypothyroidism  Assessment & Plan:  TSH is 3.210.  We will follow.      4. Vitamin D deficiency  Assessment & Plan:  Vitamin D is 73.6.  We will follow.      5. Neuropathy  Assessment & Plan:  Refill gabapentin.  [facility]ospital [facility]      6. Chronic kidney disease, stage 3b  Assessment & Plan:  GFR is 35.Patient is instructed to not take any NSAIDs.  Medicines as directed.  Stay well-hydrated.        7. Elevated liver function tests  Assessment & Plan:  Liver ultrasound.  Had a CT in 2020 that showed homogenous liver.    Orders:  -     US Liver; Future    8. Left carotid artery occlusion  Assessment & Plan:  Patient used to see [facility].  They want to see Dr. Leyva.    Orders:  -     Ambulatory Referral to Cardiology             Follow Up:   Return in about 1 month  (around 12/14/2023) for Annual physical.    Tom Salas MD  [facility]     Answers submitted by the patient for this visit:  Primary Reason for Visit (Submitted on 11/7/2023)  What is the primary reason for your visit?: Diabetes

## 2023-11-30 DIAGNOSIS — E03.9 ACQUIRED HYPOTHYROIDISM: Primary | ICD-10-CM

## 2023-12-01 RX ORDER — LEVOTHYROXINE SODIUM 0.1 MG/1
TABLET ORAL
Qty: 90 TABLET | Refills: 1 | Status: SHIPPED | OUTPATIENT
Start: 2023-12-01

## 2023-12-07 DIAGNOSIS — E78.2 HYPERLIPIDEMIA, MIXED: Primary | ICD-10-CM

## 2023-12-08 RX ORDER — ATORVASTATIN CALCIUM 10 MG/1
10 TABLET, FILM COATED ORAL NIGHTLY
Qty: 90 TABLET | Refills: 1 | Status: SHIPPED | OUTPATIENT
Start: 2023-12-08

## 2023-12-08 RX ORDER — ATORVASTATIN CALCIUM 10 MG/1
TABLET, FILM COATED ORAL
Qty: 90 TABLET | Refills: 3 | OUTPATIENT
Start: 2023-12-08

## 2023-12-08 RX ORDER — EZETIMIBE 10 MG/1
TABLET ORAL
Qty: 90 TABLET | Refills: 3 | OUTPATIENT
Start: 2023-12-08

## 2023-12-08 RX ORDER — EZETIMIBE 10 MG/1
10 TABLET ORAL DAILY
Qty: 90 TABLET | Refills: 1 | Status: SHIPPED | OUTPATIENT
Start: 2023-12-08

## 2023-12-08 RX ORDER — LORAZEPAM 0.5 MG/1
0.5 TABLET ORAL EVERY 8 HOURS PRN
Qty: 30 TABLET | OUTPATIENT
Start: 2023-12-08

## 2023-12-11 ENCOUNTER — PATIENT OUTREACH (OUTPATIENT)
Dept: CASE MANAGEMENT | Facility: OTHER | Age: 68
End: 2023-12-11
Payer: MEDICARE

## 2023-12-11 DIAGNOSIS — E78.2 HYPERLIPIDEMIA, MIXED: ICD-10-CM

## 2023-12-11 DIAGNOSIS — N18.32 CHRONIC KIDNEY DISEASE, STAGE 3B: Primary | ICD-10-CM

## 2023-12-11 DIAGNOSIS — E11.65 UNCONTROLLED TYPE 2 DIABETES MELLITUS WITH HYPERGLYCEMIA: ICD-10-CM

## 2023-12-11 NOTE — OUTREACH NOTE
AMBULATORY CASE MANAGEMENT NOTE    Name and Relationship of Patient/Support Person: Shelley Leach Elana - Self    CCM Interim Update    Spoke with [place] for CCM update. She states that she is doing well. She is not having low blood sugars. At last visit with tad he was very pleased and A1C down to 7.6. She continues to use her dexcom.    Reminded her of appointment dates and times for liver ultrasound and appointment with Dr. Leyva.     Spoke with [employer].  She is overdue for diabetic eye exam. She has been there for cataracts and post surgical follow up. They will reach out to her to let her know she is due for eye exam.     Patient is doing well. Will change to monitoring.         Education Documentation  No documentation found.        Kizzy SUAREZ  Ambulatory Case Management    12/11/2023, 14:38 EST

## 2023-12-12 RX ORDER — SERTRALINE HYDROCHLORIDE 25 MG/1
25 TABLET, FILM COATED ORAL DAILY
Qty: 90 TABLET | Refills: 1 | Status: SHIPPED | OUTPATIENT
Start: 2023-12-12

## 2023-12-13 ENCOUNTER — TELEPHONE (OUTPATIENT)
Dept: FAMILY MEDICINE CLINIC | Facility: CLINIC | Age: 68
End: 2023-12-13
Payer: MEDICARE

## 2023-12-13 DIAGNOSIS — F41.9 ANXIETY: Primary | ICD-10-CM

## 2023-12-13 RX ORDER — LORAZEPAM 1 MG/1
1 TABLET ORAL EVERY 8 HOURS PRN
Qty: 30 TABLET | Refills: 0 | Status: SHIPPED | OUTPATIENT
Start: 2023-12-13

## 2023-12-13 NOTE — TELEPHONE ENCOUNTER
----- Message from Cathi Srinivasan MA sent at 12/13/2023  7:31 AM EST -----  Regarding: FW: Neto  Contact: [phone]    ----- Message -----  From: Shelley Leach  Sent: 12/12/2023   5:35 PM EST  To: Antoni Falcon [facility]  Subject: Lorazapam                                        Is it possible to take my Lorazapam  More than just at nighttime.  My daughter, Iris Hightower, has bladder cancer surgery on Thursday.  I am a mess.  Thank you.

## 2023-12-15 ENCOUNTER — TELEPHONE (OUTPATIENT)
Dept: FAMILY MEDICINE CLINIC | Facility: CLINIC | Age: 68
End: 2023-12-15
Payer: MEDICARE

## 2023-12-15 NOTE — TELEPHONE ENCOUNTER
PA sent for Repatha through cover my meds. Got a response back with Authorization starting on 01/01/2022 and ending on 12/31/2024.

## 2023-12-18 ENCOUNTER — HOSPITAL ENCOUNTER (OUTPATIENT)
Dept: ULTRASOUND IMAGING | Facility: HOSPITAL | Age: 68
Discharge: HOME | End: 2023-12-18
Admitting: FAMILY MEDICINE
Payer: MEDICARE

## 2023-12-18 DIAGNOSIS — R79.89 ELEVATED LIVER FUNCTION TESTS: ICD-10-CM

## 2023-12-18 PROCEDURE — 76705 ECHO EXAM OF ABDOMEN: CPT

## 2023-12-19 ENCOUNTER — TELEPHONE (OUTPATIENT)
Dept: ENDOCRINOLOGY | Facility: CLINIC | Age: 68
End: 2023-12-19
Payer: MEDICARE

## 2023-12-19 RX ORDER — BLOOD-GLUCOSE METER
1 EACH MISCELLANEOUS SEE ADMIN INSTRUCTIONS
Qty: 1 KIT | Refills: 0 | Status: SHIPPED | OUTPATIENT
Start: 2023-12-19

## 2023-12-19 NOTE — TELEPHONE ENCOUNTER
Returned pt call, we do no have any samples, advised her to call Dexcom for replacement sensor. Pt uses OP5 - was not aware she can wear the pump without her sensor. Advised pt to resume pump therapy in Manual mode until she can get a sensor.   Pt needs new script for AccuCheck Guide sent to [facility] pharmacy. She has been using [employer] w/out Dexcom but her meter is no longer working. Will have new script sent in, if it is not covered, advised pt to go to [facility] and  a Relion meter and supplies.

## 2023-12-19 NOTE — TELEPHONE ENCOUNTER
PT CALLED STATING SHE IS OUT OF [employer] G6 SENSORS AND REQUESTED SAMPLES. PT STATED HER MAIL ORDER PHARM WILL NOT SEND HER NEW SENSORS FOR ANOTHER 10 DAYS.

## 2023-12-27 RX ORDER — PROCHLORPERAZINE 25 MG/1
1 SUPPOSITORY RECTAL
Qty: 1 EACH | Refills: 3 | Status: SHIPPED | OUTPATIENT
Start: 2023-12-27

## 2023-12-27 RX ORDER — PROCHLORPERAZINE 25 MG/1
SUPPOSITORY RECTAL
Qty: 9 EACH | Refills: 3 | Status: SHIPPED | OUTPATIENT
Start: 2023-12-27

## 2023-12-27 NOTE — TELEPHONE ENCOUNTER
Caller: HayleeShelleyil    Relationship: Self    Best call back number: [phone]     Requested Prescriptions:   Requested Prescriptions     Pending Prescriptions Disp Refills    Continuous Blood Gluc Transmit (Dexcom G6 Transmitter) misc 1 each 3     Sig: Use 1 each Every 3 (Three) Months.    Continuous Blood Gluc Sensor (Dexcom G6 Sensor) 9 each 3     Sig: Use Every 10 (Ten) Days. Every ten days        Pharmacy where request should be sent:    [address] [phone] [facility] [phone] FX     Last office visit with prescribing clinician: 9/28/2023   Next office visit with prescribing clinician: 3/29/2024     Additional details provided by patient: BEEN WITHOUT HER SENSORS FOR A MONTH    Does the patient have less than a 3 day supply:  [x] Yes  [] No    Would you like a call back once the refill request has been completed: [] Yes [x] No    If the office needs to give you a call back, can they leave a voicemail: [] Yes [x] No    Bryce Elena Rep   12/27/23 15:52 EST

## 2023-12-27 NOTE — TELEPHONE ENCOUNTER
Rx Refill Note  Requested Prescriptions     Pending Prescriptions Disp Refills    Continuous Blood Gluc Transmit (Dexcom G6 Transmitter) misc 1 each 3     Sig: Use 1 each Every 3 (Three) Months.    Continuous Blood Gluc Sensor (Dexcom G6 Sensor) 9 each 3     Sig: Use Every 10 (Ten) Days. Every ten days      Last office visit with prescribing clinician: 9/28/2023     Next office visit with prescribing clinician: 3/29/2024

## 2023-12-28 ENCOUNTER — TELEPHONE (OUTPATIENT)
Dept: ENDOCRINOLOGY | Facility: CLINIC | Age: 68
End: 2023-12-28
Payer: MEDICARE

## 2023-12-28 ENCOUNTER — OFFICE VISIT (OUTPATIENT)
Dept: FAMILY MEDICINE CLINIC | Facility: CLINIC | Age: 68
End: 2023-12-28
Payer: MEDICARE

## 2023-12-28 VITALS
BODY MASS INDEX: 29.9 KG/M2 | WEIGHT: 148.3 LBS | DIASTOLIC BLOOD PRESSURE: 62 MMHG | TEMPERATURE: 96 F | SYSTOLIC BLOOD PRESSURE: 126 MMHG | HEIGHT: 59 IN | OXYGEN SATURATION: 98 % | HEART RATE: 70 BPM | RESPIRATION RATE: 16 BRPM

## 2023-12-28 DIAGNOSIS — R79.89 ELEVATED LIVER FUNCTION TESTS: ICD-10-CM

## 2023-12-28 DIAGNOSIS — E03.9 ACQUIRED HYPOTHYROIDISM: ICD-10-CM

## 2023-12-28 DIAGNOSIS — E11.22 CKD STAGE 3 DUE TO TYPE 2 DIABETES MELLITUS: ICD-10-CM

## 2023-12-28 DIAGNOSIS — E78.2 HYPERLIPIDEMIA, MIXED: ICD-10-CM

## 2023-12-28 DIAGNOSIS — N18.30 CKD STAGE 3 DUE TO TYPE 2 DIABETES MELLITUS: ICD-10-CM

## 2023-12-28 DIAGNOSIS — F43.29 ADJUSTMENT DISORDER WITH OTHER SYMPTOM: ICD-10-CM

## 2023-12-28 DIAGNOSIS — I10 HYPERTENSION, ESSENTIAL: Primary | ICD-10-CM

## 2023-12-28 DIAGNOSIS — E53.8 B12 DEFICIENCY: ICD-10-CM

## 2023-12-28 RX ORDER — BLOOD SUGAR DIAGNOSTIC
STRIP MISCELLANEOUS
Qty: 300 EACH | Refills: 3 | Status: SHIPPED | OUTPATIENT
Start: 2023-12-28

## 2023-12-28 RX ORDER — CYANOCOBALAMIN 1000 UG/ML
1000 INJECTION, SOLUTION INTRAMUSCULAR; SUBCUTANEOUS
Status: SHIPPED | OUTPATIENT
Start: 2023-12-28

## 2023-12-28 RX ORDER — LANCETS 33 GAUGE
1 EACH MISCELLANEOUS 3 TIMES DAILY
Qty: 300 EACH | Refills: 3 | Status: SHIPPED | OUTPATIENT
Start: 2023-12-28

## 2023-12-28 RX ORDER — CHLORCYCLIZINE HYDROCHLORIDE AND PSEUDOEPHEDRINE HYDROCHLORIDE 25; 60 MG/1; MG/1
1 TABLET ORAL 2 TIMES DAILY PRN
Qty: 42 TABLET | Refills: 0 | Status: SHIPPED | OUTPATIENT
Start: 2023-12-28

## 2023-12-28 RX ADMIN — CYANOCOBALAMIN 1000 MCG: 1000 INJECTION, SOLUTION INTRAMUSCULAR; SUBCUTANEOUS at 10:33

## 2023-12-28 NOTE — ASSESSMENT & PLAN NOTE
Liver function were abnormal.  Liver ultrasound was normal.  I will recheck in 3 months.  If her liver functions remain elevated we may do a more extensive blood workup.

## 2023-12-28 NOTE — TELEPHONE ENCOUNTER
Caller: Shelley Leach    Relationship: Self    Best call back number: [phone]    Requested Prescriptions:  Blood Glucose Monitoring Suppl (Accu-Chek Guide) w/Device kit   Continuous Blood Gluc Transmit (Dexcom G6 Transmitter) misc    Continuous Blood Gluc Sensor (Dexcom G6 Sensor)   STRIPS AND LANCETS  Requested Prescriptions      No prescriptions requested or ordered in this encounter        Pharmacy where request should be sent:   [facility] [address] [phone] [facility] [phone] [facility] [address]     Last office visit with prescribing clinician: 9/28/2023   Last telemedicine visit with prescribing clinician: Visit date not found   Next office visit with prescribing clinician: 3/29/2024     Additional details provided by patient:    Does the patient have less than a 3 day supply:  [x] Yes  [] No    Would you like a call back once the refill request has been completed: [x] Yes [] No    If the office needs to give you a call back, can they leave a voicemail: [x] Yes [] No    Bryce Osuna Rep   12/28/23 09:32 EST

## 2023-12-28 NOTE — PROGRESS NOTES
Patient Name: Shelley Leach  : 1955   MRN: [id number]     Chief Complaint:    Chief Complaint   Patient presents with    Liver Follow-up     Pt here to review ultrasound results         History of Present Illness: Shelley Leach is a 68 y.o. female who is here today for follow up on liver u/s and stress  HPI        Review of Systems:   Review of Systems   Constitutional: Negative.    HENT: Negative.     Eyes: Negative.    Respiratory: Negative.     Cardiovascular: Negative.    Gastrointestinal: Negative.    Neurological: Negative.         Past Medical History:   Past Medical History:   Diagnosis Date    Allergic     Anemia     Anxiety     Arthritis     Bilateral carotid artery stenosis     Cataract 2016    Surgery    Cholelithiasis 1976    Surgery    Clostridioides difficile infection     15 YEARS AGO, TREATED    Colon polyp     Coronary artery disease     Depression     Dietary counseling     diet education-Abstracted from [place]    Dietary counseling and surveillance     Diverticulosis     Elevated cholesterol     GERD (gastroesophageal reflux disease)     Goiter     Heart murmur     History of thyroid disorder     Thyroid problem-Abstracted from [place]    History of transfusion     NO REACTION    Hypertension     Hyperthyroidism     Hypoglycemia associated with type 2 diabetes mellitus 2020    Hypothyroidism     Kidney stone 1981    Leaky heart valve     Obesity 1995    Gastric bypass in 2018    Peptic ulceration 2019    After gastric bypass    PONV (postoperative nausea and vomiting)     Renal insufficiency     Type 2 diabetes mellitus, uncontrolled     Visual impairment 1968    Vitamin D deficiency        Past Surgical History:   Past Surgical History:   Procedure Laterality Date    ADENOIDECTOMY      BARIATRIC SURGERY  10/18    BLEPHAROPLASTY Bilateral     CARDIAC CATHETERIZATION      NO INTERVENTION-20 YRS AGO    CAROTID ARTERY  ANGIOPLASTY N/A     CAROTID ENDARTERECTOMY Right 2021    Procedure: CAROTID ENDARTERECTOMY WITH EEG RIGHT;  Surgeon: Javed Morris MD;  Location:  [facility];  Service: Vascular;  Laterality: Right;    CARPAL TUNNEL RELEASE Bilateral     CATARACT EXTRACTION      CHOLECYSTECTOMY      COLONOSCOPY      GASTRIC BYPASS      HERNIA REPAIR  2019    HYSTERECTOMY      INTERVENTIONAL RADIOLOGY PROCEDURE Bilateral 2021    Procedure: Carotid Cerebral Angiogram;  Surgeon: Sukumar Francis MD;  Location:  [facility];  Service: Interventional Radiology;  Laterality: Bilateral;    JOINT REPLACEMENT      Both shoulders    OVARIAN CYST SURGERY      x 2    ROTATOR CUFF REPAIR Left     SUBTOTAL HYSTERECTOMY      THYROIDECTOMY, PARTIAL      TONSILLECTOMY         Family History:   Family History   Problem Relation Age of Onset    Diabetes Mother             Anemia Mother     Cancer Mother             Hypertension Mother             Anxiety disorder Mother             Arthritis Mother             Depression Mother             Hearing loss Mother             Miscarriages / Stillbirths Mother         -stillborn twins    Stroke Mother          from massive stroke 2023    Vision loss Mother             Diabetes Father             Hypertension Father             Thyroid disease Father             Kidney disease Father             Cancer Father         Decreased    Obesity Father     Arthritis Father             Hearing loss Father             Heart disease Father             Kidney disease Maternal Grandfather             Hypertension Maternal Grandmother             Obesity Paternal Grandmother     Cancer Maternal Aunt             Cancer Paternal Aunt         Breast    Diabetes Paternal Aunt     Cancer Maternal Uncle         Bladder     Diabetes Maternal Uncle     Hypertension Maternal Uncle     Kidney disease Maternal Uncle     Obesity Maternal Uncle     Cancer Maternal Aunt         Breast-    Diabetes Maternal Aunt             Diabetes Brother             Hypertension Brother             Kidney disease Brother             Obesity Brother     Cancer Brother             Thyroid disease Brother             Diabetes Maternal Uncle             Diabetes Maternal Aunt             Early death Maternal Aunt             Asthma Brother         Decreased       Social History:   Social History     Socioeconomic History    Marital status:     Number of children: 2   Tobacco Use    Smoking status: Never    Smokeless tobacco: Never    Tobacco comments:     Father smoked until i was 8   Vaping Use    Vaping Use: Never used   Substance and Sexual Activity    Alcohol use: Never    Drug use: Never    Sexual activity: Yes     Partners: Male     Birth control/protection: Vasectomy, Hysterectomy       Medications:     Current Outpatient Medications:     ALPRAZolam (XANAX) 0.5 MG tablet, Take 1 tablet by mouth At Night As Needed., Disp: , Rfl:     aspirin 81 MG EC tablet, Take 1 tablet by mouth Daily., Disp: , Rfl:     atorvastatin (LIPITOR) 10 MG tablet, Take 1 tablet by mouth Every Night., Disp: 90 tablet, Rfl: 1    Biotin 40695 MCG tablet dispersible, Place 10,000 mcg on the tongue 2 (two) times a day., Disp: , Rfl:     Blood Glucose Monitoring Suppl (Accu-Chek Guide) w/Device kit, Use 1 kit See Admin Instructions. Use to check blood sugar 4 times daily.  Dx E11.649, on insulin, Disp: 1 kit, Rfl: 0    Blood Glucose Monitoring Suppl (True Metrix Air Glucose Meter) w/Device kit, 1 each 4 (Four) Times a Day Before Meals & at Bedtime As Needed (prn)., Disp: 1 kit, Rfl: 0    buPROPion XL (WELLBUTRIN XL) 300 MG 24 hr tablet, Take 1 tablet by mouth Daily., Disp: 90 tablet, Rfl: 1     cetirizine (zyrTEC) 10 MG tablet, TAKE 1 TABLET EVERY DAY, Disp: 90 tablet, Rfl: 10    clopidogrel (PLAVIX) 75 MG tablet, TAKE 1 TABLET EVERY DAY, Disp: 90 tablet, Rfl: 3    Continuous Blood Gluc Sensor (Dexcom G6 Sensor), Use Every 10 (Ten) Days. Every ten days, Disp: 9 each, Rfl: 3    Continuous Blood Gluc Transmit (Dexcom G6 Transmitter) misc, Use 1 each Every 3 (Three) Months., Disp: 1 each, Rfl: 3    Cyanocobalamin (B-12 Compliance Injection) 1000 MCG/ML kit, Inject  as directed., Disp: , Rfl:     estradiol (ESTRACE) 0.1 MG/GM vaginal cream, INSERT 1G VAGINALY TWICE A WEEK AT BEDTIME, Disp: , Rfl:     ezetimibe (ZETIA) 10 MG tablet, Take 1 tablet by mouth Daily., Disp: 90 tablet, Rfl: 1    famotidine (PEPCID) 40 MG tablet, , Disp: , Rfl:     gabapentin (NEURONTIN) 300 MG capsule, Take 1 capsule by mouth 3 (Three) Times a Day., Disp: 90 capsule, Rfl: 2    Glucagon (Gvoke HypoPen 2-Pack) 1 MG/0.2ML solution auto-injector, Inject 1 dose under the skin into the appropriate area as directed As Needed (for hypoglycemia)., Disp: 1 mL, Rfl: 3    glucose blood (Accu-Chek Guide) test strip, Check blood sugar 3 times daily dx e11.65 on insulin pump, Disp: 300 each, Rfl: 3    glucose blood (True Metrix Blood Glucose Test) test strip, Pt to test 3 to 4 times a day as needed to calculate continuous meter, Disp: 100 each, Rfl: 11    Insulin Aspart (novoLOG) 100 UNIT/ML injection, 100 units per day via pump, Disp: 90 mL, Rfl: 3    Insulin Disposable Pump (Omnipod 5 G6 Pod, Gen 5,) misc, 1 each Every 72 (Seventy-Two) Hours., Disp: 30 each, Rfl: 3    Lancets 33G misc, Use 1 each 3 (Three) Times a Day. Dx e11.65 on insulin pump, Disp: 300 each, Rfl: 3    levothyroxine (SYNTHROID, LEVOTHROID) 100 MCG tablet, TAKE 1 TABLET EVERY DAY, Disp: 90 tablet, Rfl: 1    LORazepam (Ativan) 1 MG tablet, Take 1 tablet by mouth Every 8 (Eight) Hours As Needed for Anxiety., Disp: 30 tablet, Rfl: 0    losartan (COZAAR) 100 MG tablet, Take 1 tablet  by mouth Daily., Disp: , Rfl:     multivitamin (THERAGRAN) tablet tablet, take 1 tablet by oral route  every day with food, Disp: , Rfl:     Myrbetriq 25 MG tablet sustained-release 24 hour 24 hr tablet, TAKE 1 TABLET DAILY, SWALLOWING WHOLE WITH WATER. DO NOT CRUSH, CHEW, AND/OR DIVIDE, Disp: 90 tablet, Rfl: 10    Probiotic Product (TRUBIOTICS PO), Take 1 capsule by mouth Daily., Disp: , Rfl:     Repatha solution prefilled syringe injection, INJECT 1 ML UNDER THE SKIN INTO THE APPROPRIATE AREA AS DIRECTED EVERY 14  DAYS., Disp: 6 mL, Rfl: 10    sertraline (Zoloft) 25 MG tablet, Take 1 tablet by mouth Daily., Disp: 90 tablet, Rfl: 1    vitamin D3 125 MCG (5000 UT) capsule capsule, Take 2 capsules by mouth Daily., Disp: , Rfl:     cephalexin (Keflex) 500 MG capsule, Take 1 capsule by mouth 4 (Four) Times a Day., Disp: 40 capsule, Rfl: 0    Chlorcyclizine-Pseudoephed (Stahist AD) 25-60 MG tablet, Take 1 tablet by mouth 2 (Two) Times a Day As Needed (runny nose)., Disp: 42 tablet, Rfl: 0    losartan (COZAAR) 50 MG tablet, TAKE 1 TABLET EVERY DAY, Disp: 90 tablet, Rfl: 0    Current Facility-Administered Medications:     cyanocobalamin injection 1,000 mcg, 1,000 mcg, Intramuscular, Q28 Days, Tom Salas MD, 1,000 mcg at 03/30/23 1015    cyanocobalamin injection 1,000 mcg, 1,000 mcg, Intramuscular, Q28 Days, Tom Salas MD    cyanocobalamin injection 1,000 mcg, 1,000 mcg, Intramuscular, Q28 Days, Tom Salas MD, 1,000 mcg at 12/28/23 1033    Facility-Administered Medications Ordered in Other Visits:     Chlorhexidine Gluconate Cloth 2 % pads 1 application, 1 application , Topical, Q12H PRN, Sarah Wan PA-C    Chlorhexidine Gluconate Cloth 2 % pads 1 application, 1 application , Topical, Q12H PRN, Jeffery Sumner PA    Allergies:   Allergies   Allergen Reactions    Percocet [Oxycodone-Acetaminophen] Itching    Morphine Other (See Comments)     HYPOTENSION    Sulfamethoxazole-Trimethoprim Swelling  "   Penicillins Rash         Physical Exam:  Vital Signs:   Vitals:    12/28/23 1005   BP: 126/62   BP Location: Left arm   Patient Position: Sitting   Pulse: 70   Resp: 16   Temp: 96 °F (35.6 °C)   SpO2: 98%   Weight: 67.3 kg (148 lb 4.8 oz)   Height: 149.9 cm (59.02\")   PainSc: 0-No pain     Body mass index is 29.94 kg/m².     Physical Exam  Vitals and nursing note reviewed.   Constitutional:       Appearance: Normal appearance. She is normal weight.   HENT:      Head: Normocephalic and atraumatic.      Right Ear: Tympanic membrane, ear canal and external ear normal.      Left Ear: Tympanic membrane, ear canal and external ear normal.      Nose: Nose normal.      Mouth/Throat:      Mouth: Mucous membranes are dry.      Pharynx: Oropharynx is clear.   Eyes:      Extraocular Movements: Extraocular movements intact.      Conjunctiva/sclera: Conjunctivae normal.      Pupils: Pupils are equal, round, and reactive to light.   Cardiovascular:      Rate and Rhythm: Normal rate and regular rhythm.      Pulses: Normal pulses.      Heart sounds: Normal heart sounds.   Pulmonary:      Effort: Pulmonary effort is normal.      Breath sounds: Normal breath sounds.   Musculoskeletal:      Cervical back: Normal range of motion and neck supple.   Feet:      Comments:      Neurological:      Mental Status: She is alert.         Procedures      Assessment/Plan:   Diagnoses and all orders for this visit:    1. Hypertension, essential (Primary)  Assessment & Plan:  Discussed with patient to monitor their blood pressure and if systolic blood pressure goes above 140 or diastolic is above 90 to return to clinic.  Take medicines as directed, call for any problems, patient not having or any worrisome symptoms.        Orders:  -     Hemoglobin A1c; Future  -     Lipid Panel; Future  -     Comprehensive Metabolic Panel; Future  -     Vitamin B12; Future  -     Vitamin D,25-Hydroxy; Future  -     TSH Rfx On Abnormal To Free T4; Future  -     " CBC & Differential; Future    2. Hyperlipidemia, mixed  Assessment & Plan:  HDL 52.  LDL 17.  Recheck in 3 months.    Orders:  -     Hemoglobin A1c; Future  -     Lipid Panel; Future  -     Comprehensive Metabolic Panel; Future  -     Vitamin B12; Future  -     Vitamin D,25-Hydroxy; Future  -     TSH Rfx On Abnormal To Free T4; Future  -     CBC & Differential; Future    3. Acquired hypothyroidism  Assessment & Plan:  TSH is 3.210.  Recheck in 3 months.    Orders:  -     Hemoglobin A1c; Future  -     Lipid Panel; Future  -     Comprehensive Metabolic Panel; Future  -     Vitamin B12; Future  -     Vitamin D,25-Hydroxy; Future  -     TSH Rfx On Abnormal To Free T4; Future  -     CBC & Differential; Future    4. B12 deficiency  Assessment & Plan:  We will give B12 shot today.    Orders:  -     cyanocobalamin injection 1,000 mcg  -     Hemoglobin A1c; Future  -     Lipid Panel; Future  -     Comprehensive Metabolic Panel; Future  -     Vitamin B12; Future  -     Vitamin D,25-Hydroxy; Future  -     TSH Rfx On Abnormal To Free T4; Future  -     CBC & Differential; Future    5. CKD stage 3 due to type 2 diabetes mellitus  Assessment & Plan:  GFR is 35.Patient is instructed to not take any NSAIDs.  Medicines as directed.  Stay well-hydrated.      Orders:  -     Hemoglobin A1c; Future  -     Lipid Panel; Future  -     Comprehensive Metabolic Panel; Future  -     Vitamin B12; Future  -     Vitamin D,25-Hydroxy; Future  -     TSH Rfx On Abnormal To Free T4; Future  -     CBC & Differential; Future    6. Adjustment disorder with other symptom  Assessment & Plan:  Patient's daughter is undergoing bladder cancer treatment.  Will follow-up.    Orders:  -     Hemoglobin A1c; Future  -     Lipid Panel; Future  -     Comprehensive Metabolic Panel; Future  -     Vitamin B12; Future  -     Vitamin D,25-Hydroxy; Future  -     TSH Rfx On Abnormal To Free T4; Future  -     CBC & Differential; Future    7. Elevated liver function  tests  Assessment & Plan:  Liver function were abnormal.  Liver ultrasound was normal.  I will recheck in 3 months.  If her liver functions remain elevated we may do a more extensive blood workup.    Orders:  -     Hemoglobin A1c; Future  -     Lipid Panel; Future  -     Comprehensive Metabolic Panel; Future  -     Vitamin B12; Future  -     Vitamin D,25-Hydroxy; Future  -     TSH Rfx On Abnormal To Free T4; Future  -     CBC & Differential; Future    Other orders  -     Chlorcyclizine-Pseudoephed (Stahist AD) 25-60 MG tablet; Take 1 tablet by mouth 2 (Two) Times a Day As Needed (runny nose).  Dispense: 42 tablet; Refill: 0             Follow Up:   Return in about 3 months (around 3/28/2024) for Annual physical, Bloodwork 1 week prior to next appointment.    Tom Salas MD  [facility]     Answers submitted by the patient for this visit:  Other (Submitted on 12/21/2023)  Please describe your symptoms.: Recheck on liver ultrasound  Have you had these symptoms before?: No  How long have you been having these symptoms?: 1-4 days  Primary Reason for Visit (Submitted on 12/21/2023)  What is the primary reason for your visit?: Other

## 2023-12-28 NOTE — TELEPHONE ENCOUNTER
Spoke with patient.  Filled out paperwork to submit to [facility] to see if we can get supplies through them.

## 2024-01-03 ENCOUNTER — TELEPHONE (OUTPATIENT)
Dept: CARDIAC SURGERY | Facility: CLINIC | Age: 69
End: 2024-01-03
Payer: MEDICARE

## 2024-01-10 ENCOUNTER — TELEPHONE (OUTPATIENT)
Dept: CASE MANAGEMENT | Facility: OTHER | Age: 69
End: 2024-01-10
Payer: MEDICARE

## 2024-01-24 DIAGNOSIS — F41.9 ANXIETY: ICD-10-CM

## 2024-01-24 DIAGNOSIS — G62.9 NEUROPATHY: ICD-10-CM

## 2024-01-24 RX ORDER — LORAZEPAM 1 MG/1
1 TABLET ORAL EVERY 8 HOURS PRN
Qty: 30 TABLET | Refills: 0 | Status: SHIPPED | OUTPATIENT
Start: 2024-01-24

## 2024-01-24 RX ORDER — GABAPENTIN 300 MG/1
300 CAPSULE ORAL 3 TIMES DAILY
Qty: 90 CAPSULE | Refills: 1 | Status: SHIPPED | OUTPATIENT
Start: 2024-01-24

## 2024-01-24 NOTE — TELEPHONE ENCOUNTER
Rx Refill Note    Requested Prescriptions     Pending Prescriptions Disp Refills    gabapentin (NEURONTIN) 300 MG capsule [Pharmacy Med Name: GABAPENTIN 300 MG Capsule] 90 capsule 0     Sig: TAKE 1 CAPSULE THREE TIMES DAILY    LORazepam (ATIVAN) 1 MG tablet [Pharmacy Med Name: LORAZEPAM 1 MG Tablet] 30 tablet 0     Sig: TAKE 1 TABLET EVERY 8 HOURS AS NEEDED FOR ANXIETY        Last office visit with prescribing clinician: 12/28/2023      Next office visit with prescribing clinician: 3/28/2024   Last labs:   Last refill: needs   Pharmacy (be sure to add in Epic). correct

## 2024-01-30 RX ORDER — CLOPIDOGREL BISULFATE 75 MG/1
75 TABLET ORAL DAILY
Qty: 90 TABLET | Refills: 1 | Status: SHIPPED | OUTPATIENT
Start: 2024-01-30

## 2024-01-30 NOTE — TELEPHONE ENCOUNTER
Rx Refill Note    Requested Prescriptions     Pending Prescriptions Disp Refills    clopidogrel (PLAVIX) 75 MG tablet 90 tablet 0     Sig: Take 1 tablet by mouth Daily.        Last office visit with prescribing clinician: 12/28/2023      Next office visit with prescribing clinician: 3/28/2024   Last labs:   Last refill: 11/17/2022   Pharmacy (be sure to add in Epic). correct

## 2024-01-30 NOTE — TELEPHONE ENCOUNTER
Caller: [facility] - [place], [place] - [address] - [phone] [facility] [phone] FX    Relationship: Pharmacy    Best call back number: [phone]     Requested Prescriptions:   Requested Prescriptions     Pending Prescriptions Disp Refills    clopidogrel (PLAVIX) 75 MG tablet 90 tablet 3     Sig: Take 1 tablet by mouth Daily.        Pharmacy where request should be sent: [facility] - [place], [place] - [address] - [phone] [facility] [phone] FX     Last office visit with prescribing clinician: 12/28/2023   Last telemedicine visit with prescribing clinician: Visit date not found   Next office visit with prescribing clinician: 3/28/2024     Does the patient have less than a 3 day supply:  [] Yes  [x] No    Would you like a call back once the refill request has been completed: [] Yes [x] No    If the office needs to give you a call back, can they leave a voicemail: [] Yes [x] No    Bryce Ramirez Rep   01/30/24 12:01 EST

## 2024-02-05 ENCOUNTER — OFFICE VISIT (OUTPATIENT)
Dept: CARDIOLOGY | Facility: CLINIC | Age: 69
End: 2024-02-05
Payer: MEDICARE

## 2024-02-05 VITALS
BODY MASS INDEX: 28.86 KG/M2 | HEART RATE: 56 BPM | DIASTOLIC BLOOD PRESSURE: 68 MMHG | SYSTOLIC BLOOD PRESSURE: 124 MMHG | WEIGHT: 147 LBS | HEIGHT: 60 IN

## 2024-02-05 DIAGNOSIS — E78.2 HYPERLIPIDEMIA, MIXED: ICD-10-CM

## 2024-02-05 DIAGNOSIS — I65.22 LEFT CAROTID ARTERY OCCLUSION: ICD-10-CM

## 2024-02-05 DIAGNOSIS — I10 HYPERTENSION, ESSENTIAL: ICD-10-CM

## 2024-02-05 DIAGNOSIS — E78.00 HYPERCHOLESTEREMIA: ICD-10-CM

## 2024-02-05 DIAGNOSIS — I65.21 STENOSIS OF RIGHT CAROTID ARTERY: Primary | ICD-10-CM

## 2024-02-05 PROCEDURE — 93000 ELECTROCARDIOGRAM COMPLETE: CPT | Performed by: INTERNAL MEDICINE

## 2024-02-05 PROCEDURE — 99204 OFFICE O/P NEW MOD 45 MIN: CPT | Performed by: INTERNAL MEDICINE

## 2024-02-05 PROCEDURE — 3074F SYST BP LT 130 MM HG: CPT | Performed by: INTERNAL MEDICINE

## 2024-02-05 PROCEDURE — 3078F DIAST BP <80 MM HG: CPT | Performed by: INTERNAL MEDICINE

## 2024-02-05 NOTE — PROGRESS NOTES
OFFICE VISIT  NOTE  [facility] CARDIOLOGY      Name: Shelley Leach    Date: 2024  MRN:  [id number]  :  1955      REFERRING/PRIMARY PROVIDER:  Tom Salas MD    Chief Complaint   Patient presents with    Heart Problem       HPI: Shelley Leach is a 69 y.o. female who presents today for carotid artery stenosis.  History of right CEA in , left ICA occluded last duplex 2022.  Overall doing well working on better glucose control last A1c was 7.6 but was previously 13.  On Repatha and low-dose atorvastatin doing well with these.  Does not exercise.  Denies chest pain or shortness of breath.    Past Medical History:   Diagnosis Date    Allergic     Anemia     Anxiety     Arthritis     Bilateral carotid artery stenosis     Cataract 2016    Surgery    Cholelithiasis 1976    Surgery    Clostridioides difficile infection     15 YEARS AGO, TREATED    Colon polyp     Coronary artery disease     Depression     Dietary counseling     diet education-Abstracted from [place]    Dietary counseling and surveillance     Diverticulosis     Elevated cholesterol     GERD (gastroesophageal reflux disease)     Goiter     Heart murmur     History of thyroid disorder     Thyroid problem-Abstracted from [place]    History of transfusion     NO REACTION    Hypertension     Hyperthyroidism     Hypoglycemia associated with type 2 diabetes mellitus 2020    Hypothyroidism     Kidney stone 1981    Leaky heart valve     Obesity 1995    Gastric bypass in 2018    Peptic ulceration 2019    After gastric bypass    PONV (postoperative nausea and vomiting)     Renal insufficiency     Type 2 diabetes mellitus, uncontrolled     Visual impairment 1968    Vitamin D deficiency        Past Surgical History:   Procedure Laterality Date    ADENOIDECTOMY      BARIATRIC SURGERY  10/18    BLEPHAROPLASTY Bilateral     CARDIAC CATHETERIZATION      NO  INTERVENTION-20 YRS AGO    CAROTID ARTERY ANGIOPLASTY N/A     CAROTID ENDARTERECTOMY Right 2021    Procedure: CAROTID ENDARTERECTOMY WITH EEG RIGHT;  Surgeon: Javed Morris MD;  Location:  [facility];  Service: Vascular;  Laterality: Right;    CARPAL TUNNEL RELEASE Bilateral     CATARACT EXTRACTION      CHOLECYSTECTOMY      COLONOSCOPY      GASTRIC BYPASS      HERNIA REPAIR  2019    HYSTERECTOMY      INTERVENTIONAL RADIOLOGY PROCEDURE Bilateral 2021    Procedure: Carotid Cerebral Angiogram;  Surgeon: Sukumar Francis MD;  Location:  [facility];  Service: Interventional Radiology;  Laterality: Bilateral;    JOINT REPLACEMENT      Both shoulders    OVARIAN CYST SURGERY      x 2    ROTATOR CUFF REPAIR Left     SUBTOTAL HYSTERECTOMY      THYROIDECTOMY, PARTIAL      TONSILLECTOMY         Social History     Socioeconomic History    Marital status:     Number of children: 2   Tobacco Use    Smoking status: Never    Smokeless tobacco: Never    Tobacco comments:     Father smoked until i was 8   Vaping Use    Vaping Use: Never used   Substance and Sexual Activity    Alcohol use: Never    Drug use: Never    Sexual activity: Yes     Partners: Male     Birth control/protection: Vasectomy, Hysterectomy       Family History   Problem Relation Age of Onset    Diabetes Mother             Anemia Mother     Cancer Mother             Hypertension Mother             Anxiety disorder Mother             Arthritis Mother             Depression Mother             Hearing loss Mother             Miscarriages / Stillbirths Mother         -stillborn twins    Stroke Mother          from massive stroke 2023    Vision loss Mother             Diabetes Father             Hypertension Father             Thyroid disease Father             Kidney disease Father             Cancer Father          Decreased    Obesity Father     Arthritis Father             Hearing loss Father             Heart disease Father             Kidney disease Maternal Grandfather             Hypertension Maternal Grandmother             Obesity Paternal Grandmother     Cancer Maternal Aunt             Cancer Paternal Aunt         Breast    Diabetes Paternal Aunt     Cancer Maternal Uncle         Bladder    Diabetes Maternal Uncle     Hypertension Maternal Uncle     Kidney disease Maternal Uncle     Obesity Maternal Uncle     Cancer Maternal Aunt         Breast-    Diabetes Maternal Aunt             Diabetes Brother             Hypertension Brother             Kidney disease Brother             Obesity Brother     Cancer Brother             Thyroid disease Brother             Diabetes Maternal Uncle             Diabetes Maternal Aunt             Early death Maternal Aunt             Asthma Brother         Decreased        ROS:   Constitutional no fever,  no weight loss   Skin no rash, no subcutaneous nodules   Otolaryngeal no difficulty swallowing   Cardiovascular See HPI   Pulmonary no cough, no sputum production   Gastrointestinal no constipation, no diarrhea   Genitourinary no dysuria, no hematuria   Hematologic no easy bruisability, no abnormal bleeding   Musculoskeletal no muscle pain   Neurologic no dizziness, no falls         Allergies   Allergen Reactions    Percocet [Oxycodone-Acetaminophen] Itching    Morphine Other (See Comments)     HYPOTENSION    Sulfamethoxazole-Trimethoprim Swelling    Penicillins Rash         Current Outpatient Medications:     aspirin 81 MG EC tablet, Take 1 tablet by mouth Daily., Disp: , Rfl:     atorvastatin (LIPITOR) 10 MG tablet, Take 1 tablet by mouth Every Night., Disp: 90 tablet, Rfl: 1    Biotin 08286 MCG tablet dispersible, Place 10,000 mcg on the tongue 2  (two) times a day., Disp: , Rfl:     Blood Glucose Monitoring Suppl (Accu-Chek Guide) w/Device kit, Use 1 kit See Admin Instructions. Use to check blood sugar 4 times daily.  Dx E11.649, on insulin, Disp: 1 kit, Rfl: 0    buPROPion XL (WELLBUTRIN XL) 300 MG 24 hr tablet, Take 1 tablet by mouth Daily., Disp: 90 tablet, Rfl: 1    cetirizine (zyrTEC) 10 MG tablet, TAKE 1 TABLET EVERY DAY, Disp: 90 tablet, Rfl: 10    clopidogrel (PLAVIX) 75 MG tablet, Take 1 tablet by mouth Daily., Disp: 90 tablet, Rfl: 1    Continuous Blood Gluc Sensor (Dexcom G6 Sensor), Use Every 10 (Ten) Days. Every ten days, Disp: 9 each, Rfl: 3    Continuous Blood Gluc Transmit (Dexcom G6 Transmitter) misc, Use 1 each Every 3 (Three) Months., Disp: 1 each, Rfl: 3    Cyanocobalamin (B-12 Compliance Injection) 1000 MCG/ML kit, Inject  as directed., Disp: , Rfl:     estradiol (ESTRACE) 0.1 MG/GM vaginal cream, INSERT 1G VAGINALY TWICE A WEEK AT BEDTIME, Disp: , Rfl:     ezetimibe (ZETIA) 10 MG tablet, Take 1 tablet by mouth Daily., Disp: 90 tablet, Rfl: 1    famotidine (PEPCID) 40 MG tablet, , Disp: , Rfl:     Glucagon (Gvoke HypoPen 2-Pack) 1 MG/0.2ML solution auto-injector, Inject 1 dose under the skin into the appropriate area as directed As Needed (for hypoglycemia)., Disp: 1 mL, Rfl: 3    glucose blood (Accu-Chek Guide) test strip, Check blood sugar 3 times daily dx e11.65 on insulin pump, Disp: 300 each, Rfl: 3    glucose blood (True Metrix Blood Glucose Test) test strip, Pt to test 3 to 4 times a day as needed to calculate continuous meter, Disp: 100 each, Rfl: 11    Insulin Aspart (novoLOG) 100 UNIT/ML injection, 100 units per day via pump, Disp: 90 mL, Rfl: 3    Insulin Disposable Pump (Omnipod 5 G6 Pod, Gen 5,) misc, 1 each Every 72 (Seventy-Two) Hours., Disp: 30 each, Rfl: 3    Lancets 33G misc, Use 1 each 3 (Three) Times a Day. Dx e11.65 on insulin pump, Disp: 300 each, Rfl: 3    levothyroxine (SYNTHROID, LEVOTHROID) 100 MCG tablet, TAKE  "1 TABLET EVERY DAY, Disp: 90 tablet, Rfl: 1    LORazepam (ATIVAN) 1 MG tablet, TAKE 1 TABLET EVERY 8 HOURS AS NEEDED FOR ANXIETY, Disp: 30 tablet, Rfl: 0    losartan (COZAAR) 100 MG tablet, Take 1 tablet by mouth Daily., Disp: , Rfl:     multivitamin (THERAGRAN) tablet tablet, take 1 tablet by oral route  every day with food, Disp: , Rfl:     Myrbetriq 25 MG tablet sustained-release 24 hour 24 hr tablet, TAKE 1 TABLET DAILY, SWALLOWING WHOLE WITH WATER. DO NOT CRUSH, CHEW, AND/OR DIVIDE, Disp: 90 tablet, Rfl: 10    Probiotic Product (TRUBIOTICS PO), Take 1 capsule by mouth Daily., Disp: , Rfl:     Repatha solution prefilled syringe injection, INJECT 1 ML UNDER THE SKIN INTO THE APPROPRIATE AREA AS DIRECTED EVERY 14  DAYS., Disp: 6 mL, Rfl: 10    sertraline (Zoloft) 25 MG tablet, Take 1 tablet by mouth Daily., Disp: 90 tablet, Rfl: 1    gabapentin (NEURONTIN) 300 MG capsule, TAKE 1 CAPSULE THREE TIMES DAILY, Disp: 90 capsule, Rfl: 1    Current Facility-Administered Medications:     cyanocobalamin injection 1,000 mcg, 1,000 mcg, Intramuscular, Q28 Days, Tom Salas MD, 1,000 mcg at 03/30/23 1015    cyanocobalamin injection 1,000 mcg, 1,000 mcg, Intramuscular, Q28 Days, Tom Salas MD    cyanocobalamin injection 1,000 mcg, 1,000 mcg, Intramuscular, Q28 Days, Tom Salas MD, 1,000 mcg at 12/28/23 1033    Facility-Administered Medications Ordered in Other Visits:     Chlorhexidine Gluconate Cloth 2 % pads 1 application, 1 application , Topical, Q12H PRN, Sarah Wan PA-C    Chlorhexidine Gluconate Cloth 2 % pads 1 application, 1 application , Topical, Q12H PRN, Jeffery Sumner PA    Vitals:    02/05/24 1009   BP: 124/68   BP Location: Left arm   Patient Position: Sitting   Pulse: 56   Weight: 66.7 kg (147 lb)   Height: 152.4 cm (60\")     Body mass index is 28.71 kg/m².    PHYSICAL EXAM:    General Appearance:   well developed  well nourished  HENT:   oropharynx moist  lips not " "cyanotic  Neck:  thyroid not enlarged  supple  Respiratory:  no respiratory distress  normal breath sounds  no rales  Cardiovascular:  no jugular venous distention  regular rhythm  apical impulse normal  S1 normal, S2 normal  no S3, no S4   no murmur  no rub, no thrill  carotid pulses normal; no bruit  lower extremity edema: none      Musculoskeletal:  no clubbing of fingers.   normocephalic, head atraumatic  Skin:   warm, dry  Psychiatric:  judgement and insight appropriate  normal mood and affect    RESULTS:     ECG 12 Lead    Date/Time: 2/5/2024 11:01 AM  Performed by: Javed Leyva MD    Authorized by: Javed Leyva MD  Comparison: compared with previous ECG from 12/22/2021  Similar to previous ECG  Rhythm: sinus bradycardia  Rate: bradycardic  BPM: 56  QRS axis: normal    Clinical impression: normal ECG                Labs:  Lab Results   Component Value Date    TRIG 110 11/07/2023    HDL 52 11/07/2023    LDL 17 11/07/2023    AST 46 (H) 11/07/2023    ALT 35 (H) 11/07/2023     Lab Results   Component Value Date    HGBA1C 7.6 09/28/2023     No components found for: \"CREATINININE\"  eGFR Non  Amer   Date Value Ref Range Status   12/23/2021 58 (L) >60 mL/min/1.73 Final   12/21/2021 74 >60 mL/min/1.73 Final   11/09/2021 75 >60 mL/min/1.73 Final       Most recent PCP note, imaging tests, and labs reviewed.    ASSESSMENT:  Problem List Items Addressed This Visit       Hypercholesteremia    Stenosis of right carotid artery s/p RIGHT CEA 2021 - Primary    Hypertension, essential    Hyperlipidemia, mixed    Left carotid artery occlusion       PLAN:    1.  Carotid stenosis:  History of right CEA by Dr. Morris 2021  Left ICA is occluded  Last duplex 12/2022 reviewed, repeat duplex yearly for surveillance  Ordered today.  Continue Repatha and statin  Increase exercise    2.  Hyperlipidemia:  Goal LDL less than 50 given advanced carotid stenosis  Continue Repatha and atorvastatin, reviewed most recent LDL was " 17 which is excellent  No data to suggest a lower LDL limit    3.  Obese mellitus type II:  We discussed the importance of metabolic health to reduce incidence of ASCVD events.    Advance Care Planning   ACP discussion was held with the patient during this visit. Patient has an advance directive (not in EMR), copy requested.         Return to clinic in 12 months, or sooner as needed.    Thank you for the opportunity to share in the care of your patient; please do not hesitate to call me with any questions.     Javed Leyva MD, [facility], [facility]  Office: [phone] [address] [place] [address]    02/05/24

## 2024-02-09 ENCOUNTER — TELEPHONE (OUTPATIENT)
Dept: CASE MANAGEMENT | Facility: OTHER | Age: 69
End: 2024-02-09
Payer: MEDICARE

## 2024-02-12 ENCOUNTER — TELEPHONE (OUTPATIENT)
Dept: CARDIOLOGY | Facility: CLINIC | Age: 69
End: 2024-02-12
Payer: MEDICARE

## 2024-02-29 ENCOUNTER — TELEPHONE (OUTPATIENT)
Dept: CASE MANAGEMENT | Facility: OTHER | Age: 69
End: 2024-02-29
Payer: MEDICARE

## 2024-02-29 NOTE — TELEPHONE ENCOUNTER
CCM 90 day chart review completed. No changes to patient's condition or treatment noted. Will close CCM at this time.

## 2024-03-14 DIAGNOSIS — F41.9 ANXIETY: ICD-10-CM

## 2024-03-15 RX ORDER — LORAZEPAM 1 MG/1
1 TABLET ORAL EVERY 8 HOURS PRN
Qty: 30 TABLET | Refills: 0 | Status: SHIPPED | OUTPATIENT
Start: 2024-03-15

## 2024-03-27 RX ORDER — INSULIN PMP CART,AUT,G6/7,CNTR
EACH SUBCUTANEOUS
Qty: 30 EACH | Refills: 3 | Status: SHIPPED | OUTPATIENT
Start: 2024-03-27

## 2024-03-27 NOTE — TELEPHONE ENCOUNTER
Rx Refill Note  Requested Prescriptions     Pending Prescriptions Disp Refills    Insulin Disposable Pump (Omnipod 5 G6 Pods, Gen 5,) misc [Pharmacy Med Name: OMNIPOD 5 G6 PODS (GEN 5)]  3     Sig: USE 1 POD EVERY 72 HOURS.          Last office visit with prescribing clinician: 9/28/2023     Next office visit with prescribing clinician: 3/29/2024         Blanche Brown MA  03/27/24, 08:29 EDT

## 2024-03-28 DIAGNOSIS — F41.9 ANXIETY: ICD-10-CM

## 2024-03-28 RX ORDER — LORAZEPAM 1 MG/1
1 TABLET ORAL EVERY 8 HOURS PRN
Qty: 30 TABLET | OUTPATIENT
Start: 2024-03-28

## 2024-03-29 ENCOUNTER — OFFICE VISIT (OUTPATIENT)
Dept: ENDOCRINOLOGY | Facility: CLINIC | Age: 69
End: 2024-03-29
Payer: MEDICARE

## 2024-03-29 VITALS
BODY MASS INDEX: 29.06 KG/M2 | HEART RATE: 73 BPM | HEIGHT: 60 IN | DIASTOLIC BLOOD PRESSURE: 72 MMHG | SYSTOLIC BLOOD PRESSURE: 130 MMHG | WEIGHT: 148 LBS | OXYGEN SATURATION: 100 %

## 2024-03-29 DIAGNOSIS — E11.65 UNCONTROLLED TYPE 2 DIABETES MELLITUS WITH HYPERGLYCEMIA: Primary | ICD-10-CM

## 2024-03-29 LAB
EXPIRATION DATE: ABNORMAL
EXPIRATION DATE: ABNORMAL
GLUCOSE BLDC GLUCOMTR-MCNC: 255 MG/DL (ref 70–130)
HBA1C MFR BLD: 7.4 % (ref 4.5–5.7)
Lab: ABNORMAL
Lab: ABNORMAL

## 2024-03-29 NOTE — PROGRESS NOTES
"     Office Note      Date: 2024  Patient Name: Shelley Leach  MRN: [id number]  : 1955    Chief Complaint   Patient presents with    Diabetes     Uncontrolled type 2 diabetes mellitus with hyperglycemia         History of Present Illness:   Shelley Leach is a 69 y.o. female who presents for Diabetes type 2.   Current RX insulin in an op5   Bg is checked 288 times per day with dexcom.  Insulin doses are adjusted frequently based upon the readings.  Patient has occasional hypoglycemia.  Patient has been using the system during the last 3 months.   The last 2 weeks of dexcom data are reviewed.  0 % are low  58 % are in range  23 % are 180-250  19 % are >250  The glycemic pattern shows: post prandial hyperglycemia           Last A1c:  Hemoglobin A1C   Date Value Ref Range Status   2024 7.4 (A) 4.5 - 5.7 % Final   2021 10.40 (H) 4.80 - 5.60 % Final       Changes in health since last visit: none . Last eye exam up to date  She is due for clara .    Subjective              Review of Systems:   Review of Systems   Endocrine: Positive for polydipsia and polyuria.       The following portions of the patient's history were reviewed and updated as appropriate: allergies, current medications, past family history, past medical history, past social history, past surgical history, and problem list.    Objective     Visit Vitals  /72 (BP Location: Left arm, Patient Position: Sitting, Cuff Size: Adult)   Pulse 73   Ht 152.4 cm (60\")   Wt 67.1 kg (148 lb)   SpO2 100%   BMI 28.90 kg/m²           Physical Exam:  Physical Exam  Vitals reviewed.   Constitutional:       Appearance: Normal appearance.   Neurological:      Mental Status: She is alert.   Psychiatric:         Mood and Affect: Mood normal.         Behavior: Behavior normal.         Thought Content: Thought content normal.         Judgment: Judgment normal.          Assessment / Plan      Assessment & Plan:  Problem List Items Addressed " This Visit          Other    Uncontrolled type 2 diabetes mellitus with hyperglycemia - Primary    Current Assessment & Plan     Stable with reasonable A1c.   Based upon the cgm data, I adjusted her carb ration to give her more insulin with meals          Relevant Medications    Insulin Aspart (novoLOG) 100 UNIT/ML injection    Glucagon (Gvoke HypoPen 2-Pack) 1 MG/0.2ML solution auto-injector    Other Relevant Orders    POC Glycosylated Hemoglobin (Hb A1C) (Completed)    POC Glucose, Blood (Completed)         Electronically signed by : Sonu Talbot MD  03/29/2024

## 2024-03-30 LAB
ALBUMIN/CREAT UR: 561 MG/G CREAT (ref 0–29)
CREAT UR-MCNC: 61.3 MG/DL
MICROALBUMIN UR-MCNC: 343.8 UG/ML

## 2024-04-04 ENCOUNTER — OFFICE VISIT (OUTPATIENT)
Dept: FAMILY MEDICINE CLINIC | Facility: CLINIC | Age: 69
End: 2024-04-04
Payer: MEDICARE

## 2024-04-04 VITALS
RESPIRATION RATE: 16 BRPM | DIASTOLIC BLOOD PRESSURE: 74 MMHG | BODY MASS INDEX: 28.96 KG/M2 | OXYGEN SATURATION: 98 % | SYSTOLIC BLOOD PRESSURE: 100 MMHG | HEIGHT: 60 IN | WEIGHT: 147.5 LBS | HEART RATE: 68 BPM

## 2024-04-04 DIAGNOSIS — E53.8 B12 DEFICIENCY: ICD-10-CM

## 2024-04-04 DIAGNOSIS — E11.65 UNCONTROLLED TYPE 2 DIABETES MELLITUS WITH HYPERGLYCEMIA: ICD-10-CM

## 2024-04-04 DIAGNOSIS — I10 HYPERTENSION, ESSENTIAL: Primary | ICD-10-CM

## 2024-04-04 DIAGNOSIS — I65.22 LEFT CAROTID ARTERY OCCLUSION: ICD-10-CM

## 2024-04-04 DIAGNOSIS — F43.29 ADJUSTMENT DISORDER WITH OTHER SYMPTOM: ICD-10-CM

## 2024-04-04 DIAGNOSIS — G62.9 NEUROPATHY: ICD-10-CM

## 2024-04-04 DIAGNOSIS — Z12.31 ENCOUNTER FOR SCREENING MAMMOGRAM FOR MALIGNANT NEOPLASM OF BREAST: ICD-10-CM

## 2024-04-04 DIAGNOSIS — N18.32 CHRONIC KIDNEY DISEASE, STAGE 3B: ICD-10-CM

## 2024-04-04 DIAGNOSIS — F41.9 ANXIETY: ICD-10-CM

## 2024-04-04 DIAGNOSIS — E03.9 ACQUIRED HYPOTHYROIDISM: ICD-10-CM

## 2024-04-04 DIAGNOSIS — E78.2 HYPERLIPIDEMIA, MIXED: ICD-10-CM

## 2024-04-04 RX ORDER — GABAPENTIN 300 MG/1
300 CAPSULE ORAL 3 TIMES DAILY
Qty: 90 CAPSULE | Refills: 2 | Status: SHIPPED | OUTPATIENT
Start: 2024-04-04

## 2024-04-04 RX ORDER — LORAZEPAM 1 MG/1
1 TABLET ORAL EVERY 8 HOURS PRN
Qty: 30 TABLET | Refills: 2 | Status: SHIPPED | OUTPATIENT
Start: 2024-04-04

## 2024-04-04 NOTE — PROGRESS NOTES
The ABCs of the Annual Wellness Visit  Subsequent Medicare Wellness Visit    Subjective    Shelley Leach is a 69 y.o. female who presents for a Subsequent Medicare Wellness Visit.    The following portions of the patient's history were reviewed and   updated as appropriate: allergies, current medications, past family history, past medical history, past social history, past surgical history, and problem list.    Compared to one year ago, the patient feels her physical   health is worse.    Compared to one year ago, the patient feels her mental   health is the same.    Recent Hospitalizations:  She was not admitted to the hospital during the last year.       Current Medical Providers:  Patient Care Team:  Tom Salas MD as PCP - General  Tom Salas MD as PCP - Family Medicine  Javed Morris MD as Referring Physician (Cardiothoracic Surgery)  Sonu Talbot MD as Consulting Physician (Endocrinology)  Brice Cooley MD as Cardiologist (Internal Medicine)    Outpatient Medications Prior to Visit   Medication Sig Dispense Refill    aspirin 81 MG EC tablet Take 1 tablet by mouth Daily.      atorvastatin (LIPITOR) 10 MG tablet Take 1 tablet by mouth Every Night. 90 tablet 1    Biotin 42676 MCG tablet dispersible Place 10,000 mcg on the tongue 2 (two) times a day.      Blood Glucose Monitoring Suppl (Accu-Chek Guide) w/Device kit Use 1 kit See Admin Instructions. Use to check blood sugar 4 times daily.  Dx E11.649, on insulin 1 kit 0    buPROPion XL (WELLBUTRIN XL) 300 MG 24 hr tablet Take 1 tablet by mouth Daily. 90 tablet 1    cetirizine (zyrTEC) 10 MG tablet TAKE 1 TABLET EVERY DAY 90 tablet 10    clopidogrel (PLAVIX) 75 MG tablet Take 1 tablet by mouth Daily. 90 tablet 1    Continuous Blood Gluc Sensor (Dexcom G6 Sensor) Use Every 10 (Ten) Days. Every ten days 9 each 3    Continuous Blood Gluc Transmit (Dexcom G6 Transmitter) misc Use 1 each Every 3 (Three) Months. 1 each 3     Cyanocobalamin (B-12 Compliance Injection) 1000 MCG/ML kit Inject  as directed.      estradiol (ESTRACE) 0.1 MG/GM vaginal cream INSERT 1G VAGINALY TWICE A WEEK AT BEDTIME      famotidine (PEPCID) 40 MG tablet       Glucagon (Gvoke HypoPen 2-Pack) 1 MG/0.2ML solution auto-injector Inject 1 dose under the skin into the appropriate area as directed As Needed (for hypoglycemia). 1 mL 3    glucose blood (Accu-Chek Guide) test strip Check blood sugar 3 times daily dx e11.65 on insulin pump 300 each 3    Insulin Aspart (novoLOG) 100 UNIT/ML injection 100 units per day via pump 90 mL 3    Insulin Disposable Pump (Omnipod 5 G6 Pods, Gen 5,) misc USE 1 POD EVERY 72 HOURS. 30 each 3    Lancets 33G misc Use 1 each 3 (Three) Times a Day. Dx e11.65 on insulin pump 300 each 3    levothyroxine (SYNTHROID, LEVOTHROID) 100 MCG tablet TAKE 1 TABLET EVERY DAY 90 tablet 1    losartan (COZAAR) 100 MG tablet Take 1 tablet by mouth Daily.      Mirabegron ER (Myrbetriq) 25 MG tablet sustained-release 24 hour 24 hr tablet Take 1 tablet by mouth Daily. 90 tablet 1    multivitamin (THERAGRAN) tablet tablet take 1 tablet by oral route  every day with food      Probiotic Product (TRUBIOTICS PO) Take 1 capsule by mouth Daily.      Repatha solution prefilled syringe injection INJECT 1 ML UNDER THE SKIN INTO THE APPROPRIATE AREA AS DIRECTED EVERY 14  DAYS. 6 mL 10    sertraline (Zoloft) 25 MG tablet Take 1 tablet by mouth Daily. 90 tablet 1    ezetimibe (ZETIA) 10 MG tablet Take 1 tablet by mouth Daily. 90 tablet 1    gabapentin (NEURONTIN) 300 MG capsule TAKE 1 CAPSULE THREE TIMES DAILY 90 capsule 1    LORazepam (ATIVAN) 1 MG tablet Take 1 tablet by mouth Every 8 (Eight) Hours As Needed for Anxiety. 30 tablet 0     Facility-Administered Medications Prior to Visit   Medication Dose Route Frequency Provider Last Rate Last Admin    Chlorhexidine Gluconate Cloth 2 % pads 1 application  1 application  Topical Q12H PRN Sarah Wan PA-C         Chlorhexidine Gluconate Cloth 2 % pads 1 application  1 application  Topical Q12H PRN Jeffery Sumner PA        cyanocobalamin injection 1,000 mcg  1,000 mcg Intramuscular Q28 Days Tom Salas MD   1,000 mcg at 03/30/23 1015    cyanocobalamin injection 1,000 mcg  1,000 mcg Intramuscular Q28 Days Tom Salas MD        cyanocobalamin injection 1,000 mcg  1,000 mcg Intramuscular Q28 Days Tom Salas MD   1,000 mcg at 12/28/23 1033       No opioid medication identified on active medication list. I have reviewed chart for other potential  high risk medication/s and harmful drug interactions in the elderly.        Aspirin is on active medication list. Aspirin use is indicated based on review of current medical condition/s. Pros and cons of this therapy have been discussed today. Benefits of this medication outweigh potential harm.  Patient has been encouraged to continue taking this medication.  .      Patient Active Problem List   Diagnosis    Acquired hypothyroidism    Hypercholesteremia    Hypoglycemia associated with type 2 diabetes mellitus    Uncontrolled type 2 diabetes mellitus with hyperglycemia    Vitamin D deficiency    Stenosis of right carotid artery s/p RIGHT CEA 2021    Osteoarthritis    Gastroesophageal reflux disease    CKD stage 3 due to type 2 diabetes mellitus    Hypertension, essential    B12 deficiency    Chronic kidney disease, stage 3b    Encounter for screening mammogram for malignant neoplasm of breast    Hyperlipidemia, mixed    Incontinence of feces    Left carotid artery occlusion    Decreased libido    Adjustment disorder    Burn    Skin rash    Anxiety    Neuropathy    Insect bite of left lower leg    Sprain of left ankle    Dizziness    Elevated liver function tests     Advance Care Planning   Advance Care Planning     Advance Directive is not on file.  ACP discussion was held with the patient during this visit. Patient does not have an advance directive, information  "provided.     Objective    Vitals:    24 0958   BP: 100/74   BP Location: Left arm   Patient Position: Sitting   Cuff Size: Adult   Pulse: 68   Resp: 16   SpO2: 98%   Weight: 66.9 kg (147 lb 8 oz)   Height: 152.4 cm (60\")   PainSc:   2   PainLoc: Knee     Estimated body mass index is 28.81 kg/m² as calculated from the following:    Height as of this encounter: 152.4 cm (60\").    Weight as of this encounter: 66.9 kg (147 lb 8 oz).           Does the patient have evidence of cognitive impairment? No    Lab Results   Component Value Date    CHLPL 66 (L) 2024    TRIG 66 2024    HDL 53 2024    LDL Comment (A) 2024    VLDL TNP 2024    HGBA1C 7.4 (A) 2024        HEALTH RISK ASSESSMENT    Smoking Status:  Social History     Tobacco Use   Smoking Status Never   Smokeless Tobacco Never   Tobacco Comments    Father smoked until i was 8     Alcohol Consumption:  Social History     Substance and Sexual Activity   Alcohol Use Never     Fall Risk Screen:    STEADI Fall Risk Assessment was completed, and patient is at LOW risk for falls.Assessment completed on:2024    Depression Screenin/4/2024     9:58 AM   PHQ-2/PHQ-9 Depression Screening   Little Interest or Pleasure in Doing Things 0-->not at all   Feeling Down, Depressed or Hopeless 0-->not at all   PHQ-9: Brief Depression Severity Measure Score 0       Health Habits and Functional and Cognitive Screenin/4/2024     9:57 AM   Functional & Cognitive Status   Do you have difficulty preparing food and eating? No   Do you have difficulty bathing yourself, getting dressed or grooming yourself? No   Do you have difficulty using the toilet? No   Do you have difficulty moving around from place to place? No   Do you have trouble with steps or getting out of a bed or a chair? No   Current Diet Well Balanced Diet   Dental Exam Up to date   Eye Exam Up to date   Current Exercises Include No Regular Exercise;House " Cleaning;Walking   Do you need help using the phone?  No   Are you deaf or do you have serious difficulty hearing?  No   Do you need help to go to places out of walking distance? No   Do you need help shopping? No   Do you need help preparing meals?  No   Do you need help with housework?  No   Do you need help with laundry? No   Do you need help taking your medications? No   Do you need help managing money? No   Do you ever drive or ride in a car without wearing a seat belt? No   Have you felt unusual stress, anger or loneliness in the last month? No   Who do you live with? Spouse   If you need help, do you have trouble finding someone available to you? No       Age-appropriate Screening Schedule:  Refer to the list below for future screening recommendations based on patient's age, sex and/or medical conditions. Orders for these recommended tests are listed in the plan section. The patient has been provided with a written plan.    Health Maintenance   Topic Date Due    MAMMOGRAM  02/28/2024    INFLUENZA VACCINE  08/01/2024    DIABETIC FOOT EXAM  08/14/2024    BMI FOLLOWUP  08/14/2024    DXA SCAN  09/28/2024    HEMOGLOBIN A1C  09/29/2024    DIABETIC EYE EXAM  11/15/2024    PAP SMEAR  01/04/2025    LIPID PANEL  03/21/2025    URINE MICROALBUMIN  03/29/2025    ANNUAL WELLNESS VISIT  04/04/2025    COLORECTAL CANCER SCREENING  02/22/2032    TDAP/TD VACCINES (3 - Td or Tdap) 03/30/2033    HEPATITIS C SCREENING  Completed    COVID-19 Vaccine  Completed    RSV Vaccine - Adults  Completed    Pneumococcal Vaccine 65+  Completed    ZOSTER VACCINE  Completed                  CMS Preventative Services Quick Reference  Risk Factors Identified During Encounter  None Identified  The above risks/problems have been discussed with the patient.  Pertinent information has been shared with the patient in the After Visit Summary.  An After Visit Summary and PPPS were made available to the patient.    Follow Up:   Next Medicare Wellness  "visit to be scheduled in 1 year.       Additional E&M Note during same encounter follows:  Patient has multiple medical problems which are significant and separately identifiable that require additional work above and beyond the Medicare Wellness Visit.      Chief Complaint  Follow-up and LT Ankle Pain (Onset 2 weeks)    Subjective        HPI  Shelley Leach is also being seen today for Wellness and BW    Review of Systems   Constitutional: Negative.    HENT: Negative.     Eyes: Negative.    Respiratory: Negative.     Cardiovascular: Negative.    Gastrointestinal: Negative.        Objective   Vital Signs:  /74 (BP Location: Left arm, Patient Position: Sitting, Cuff Size: Adult)   Pulse 68   Resp 16   Ht 152.4 cm (60\")   Wt 66.9 kg (147 lb 8 oz)   SpO2 98%   BMI 28.81 kg/m²     Physical Exam  Vitals and nursing note reviewed.   Constitutional:       Appearance: Normal appearance. She is normal weight.   HENT:      Head: Normocephalic and atraumatic.      Right Ear: Tympanic membrane, ear canal and external ear normal.      Left Ear: Tympanic membrane, ear canal and external ear normal.      Nose: Nose normal.      Mouth/Throat:      Mouth: Mucous membranes are moist.      Pharynx: Oropharynx is clear.   Eyes:      Extraocular Movements: Extraocular movements intact.      Conjunctiva/sclera: Conjunctivae normal.      Pupils: Pupils are equal, round, and reactive to light.   Cardiovascular:      Rate and Rhythm: Normal rate and regular rhythm.      Pulses: Normal pulses.      Heart sounds: Normal heart sounds.   Pulmonary:      Effort: Pulmonary effort is normal.      Breath sounds: Normal breath sounds.   Abdominal:      General: Abdomen is flat. Bowel sounds are normal.      Palpations: Abdomen is soft.   Musculoskeletal:         General: Normal range of motion.      Cervical back: Normal range of motion and neck supple.   Feet:      Comments:      Skin:     General: Skin is warm and dry. "   Neurological:      General: No focal deficit present.      Mental Status: She is alert and oriented to person, place, and time.   Psychiatric:         Mood and Affect: Mood normal.         Behavior: Behavior normal.         Thought Content: Thought content normal.         Judgment: Judgment normal.          The following data was reviewed by: Tom Salas MD on 04/04/2024:  CMP          11/7/2023    10:46 3/21/2024    09:22   CMP   Glucose 100  102    BUN 15  22    Creatinine 1.60  1.41    Sodium 143  144    Potassium 4.7  4.6    Chloride 105  109    Calcium 9.4  9.2    Total Protein 6.3  6.7    Albumin 3.8  4.0    Globulin 2.5  2.7    Total Bilirubin 0.5  0.3    Alkaline Phosphatase 155  115    AST (SGOT) 46  37    ALT (SGPT) 35  27    BUN/Creatinine Ratio 9  16      CBC w/diff          11/7/2023    10:46 3/21/2024    09:22   CBC w/Diff   WBC 7.1  7.1    RBC 3.89  3.84    Hemoglobin 12.2  12.2    Hematocrit 36.9  36.9    MCV 95  96    MCH 31.4  31.8    MCHC 33.1  33.1    RDW 12.3  11.9    Platelets 232  253    Neutrophil Rel % 43  65    Lymphocyte Rel % 45  25    Monocyte Rel % 8  7    Eosinophil Rel % 3  2    Basophil Rel % 1  1      Lipid Panel          11/7/2023    10:46 3/21/2024    09:22   Lipid Panel   Total Cholesterol 89  66    Triglycerides 110  66    HDL Cholesterol 52  53    VLDL Cholesterol 20  TNP    LDL Cholesterol  17  Comment                 Assessment and Plan   Diagnoses and all orders for this visit:    1. Hypertension, essential (Primary)  Assessment & Plan:  Discussed with patient to monitor their blood pressure and if systolic blood pressure goes above 140 or diastolic is above 90 to return to clinic.  Take medicines as directed, call for any problems, patient not having or any worrisome symptoms.        Orders:  -     Hemoglobin A1c; Future  -     Lipid Panel; Future  -     Comprehensive Metabolic Panel; Future  -     Vitamin B12; Future  -     Vitamin D,25-Hydroxy; Future  -     TSH  Rfx On Abnormal To Free T4; Future  -     CBC & Differential; Future    2. Hyperlipidemia, mixed  Assessment & Plan:  HDL 53.  LDL undetectable.  We are going to stop her Zetia and recheck Blood work in 3 months.  Told her I want to shoot for her LDL to be around 30.    Orders:  -     Hemoglobin A1c; Future  -     Lipid Panel; Future  -     Comprehensive Metabolic Panel; Future  -     Vitamin B12; Future  -     Vitamin D,25-Hydroxy; Future  -     TSH Rfx On Abnormal To Free T4; Future  -     CBC & Differential; Future    3. Acquired hypothyroidism  Assessment & Plan:  TSH is 1.920.  Recheck in 3 months.    Orders:  -     Hemoglobin A1c; Future  -     Lipid Panel; Future  -     Comprehensive Metabolic Panel; Future  -     Vitamin B12; Future  -     Vitamin D,25-Hydroxy; Future  -     TSH Rfx On Abnormal To Free T4; Future  -     CBC & Differential; Future    4. B12 deficiency  Assessment & Plan:  B12 is 783.  We will replace and recheck in 3 months.    Orders:  -     Hemoglobin A1c; Future  -     Lipid Panel; Future  -     Comprehensive Metabolic Panel; Future  -     Vitamin B12; Future  -     Vitamin D,25-Hydroxy; Future  -     TSH Rfx On Abnormal To Free T4; Future  -     CBC & Differential; Future    5. Uncontrolled type 2 diabetes mellitus with hyperglycemia  -     Hemoglobin A1c; Future  -     Lipid Panel; Future  -     Comprehensive Metabolic Panel; Future  -     Vitamin B12; Future  -     Vitamin D,25-Hydroxy; Future  -     TSH Rfx On Abnormal To Free T4; Future  -     CBC & Differential; Future    6. Chronic kidney disease, stage 3b  Assessment & Plan:  GFR 41.Patient is instructed to not take any NSAIDs.  Medicines as directed.  Stay well-hydrated.      Orders:  -     Hemoglobin A1c; Future  -     Lipid Panel; Future  -     Comprehensive Metabolic Panel; Future  -     Vitamin B12; Future  -     Vitamin D,25-Hydroxy; Future  -     TSH Rfx On Abnormal To Free T4; Future  -     CBC & Differential; Future    7.  Left carotid artery occlusion  Assessment & Plan:  Aggressive risk factor modification.    Orders:  -     Hemoglobin A1c; Future  -     Lipid Panel; Future  -     Comprehensive Metabolic Panel; Future  -     Vitamin B12; Future  -     Vitamin D,25-Hydroxy; Future  -     TSH Rfx On Abnormal To Free T4; Future  -     CBC & Differential; Future    8. Encounter for screening mammogram for malignant neoplasm of breast  -     Mammo Screening Bilateral With CAD; Future  -     Hemoglobin A1c; Future  -     Lipid Panel; Future  -     Comprehensive Metabolic Panel; Future  -     Vitamin B12; Future  -     Vitamin D,25-Hydroxy; Future  -     TSH Rfx On Abnormal To Free T4; Future  -     CBC & Differential; Future    9. Adjustment disorder with other symptom  Assessment & Plan:  Patient's daughter is really struggling.  We are going to refill her Ativan today.  House bill 1      10. Anxiety  -     LORazepam (ATIVAN) 1 MG tablet; Take 1 tablet by mouth Every 8 (Eight) Hours As Needed for Anxiety.  Dispense: 30 tablet; Refill: 2    11. Neuropathy  -     gabapentin (NEURONTIN) 300 MG capsule; Take 1 capsule by mouth 3 (Three) Times a Day.  Dispense: 90 capsule; Refill: 2           I spent 10 minutes caring for Alberta on this date of service. This time includes time spent by me in the following activities:preparing for the visit, reviewing tests, obtaining and/or reviewing a separately obtained history, performing a medically appropriate examination and/or evaluation , counseling and educating the patient/family/caregiver, ordering medications, tests, or procedures, documenting information in the medical record, independently interpreting results and communicating that information with the patient/family/caregiver, and care coordination  Follow Up   Return in about 3 months (around 7/4/2024) for Annual physical, Bloodwork 1 week prior to next appointment.  Patient was given instructions and counseling regarding her condition or  for health maintenance advice. Please see specific information pulled into the AVS if appropriate.           Answers submitted by the patient for this visit:  Other (Submitted on 3/28/2024)  Please describe your symptoms.: None recheck  Have you had these symptoms before?: No  How long have you been having these symptoms?: 1-4 days  Please list any medications you are currently taking for this condition.: All  Please describe any probable cause for these symptoms. : Lifestyle  Primary Reason for Visit (Submitted on 3/28/2024)  What is the primary reason for your visit?: Other

## 2024-04-04 NOTE — ASSESSMENT & PLAN NOTE
GFR 41.Patient is instructed to not take any NSAIDs.  Medicines as directed.  Stay well-hydrated.

## 2024-04-04 NOTE — ASSESSMENT & PLAN NOTE
HDL 53.  LDL undetectable.  We are going to stop her Zetia and recheck Blood work in 3 months.  Told her I want to shoot for her LDL to be around 30.

## 2024-04-15 ENCOUNTER — OFFICE VISIT (OUTPATIENT)
Dept: FAMILY MEDICINE CLINIC | Facility: CLINIC | Age: 69
End: 2024-04-15
Payer: MEDICARE

## 2024-04-15 VITALS
DIASTOLIC BLOOD PRESSURE: 62 MMHG | HEIGHT: 60 IN | SYSTOLIC BLOOD PRESSURE: 102 MMHG | WEIGHT: 143.9 LBS | BODY MASS INDEX: 28.25 KG/M2 | HEART RATE: 63 BPM | OXYGEN SATURATION: 93 % | RESPIRATION RATE: 16 BRPM

## 2024-04-15 DIAGNOSIS — I10 HYPERTENSION, ESSENTIAL: ICD-10-CM

## 2024-04-15 DIAGNOSIS — H81.90 VESTIBULAR DYSFUNCTION, UNSPECIFIED LATERALITY: ICD-10-CM

## 2024-04-15 DIAGNOSIS — R04.0 EPISTAXIS: Primary | ICD-10-CM

## 2024-04-15 PROCEDURE — 99214 OFFICE O/P EST MOD 30 MIN: CPT | Performed by: FAMILY MEDICINE

## 2024-04-15 PROCEDURE — G2211 COMPLEX E/M VISIT ADD ON: HCPCS | Performed by: FAMILY MEDICINE

## 2024-04-15 PROCEDURE — 3078F DIAST BP <80 MM HG: CPT | Performed by: FAMILY MEDICINE

## 2024-04-15 PROCEDURE — 3051F HG A1C>EQUAL 7.0%<8.0%: CPT | Performed by: FAMILY MEDICINE

## 2024-04-15 PROCEDURE — 3074F SYST BP LT 130 MM HG: CPT | Performed by: FAMILY MEDICINE

## 2024-04-15 RX ORDER — MECLIZINE HYDROCHLORIDE 25 MG/1
25 TABLET ORAL 3 TIMES DAILY PRN
Qty: 60 TABLET | Refills: 1 | Status: SHIPPED | OUTPATIENT
Start: 2024-04-15 | End: 2024-04-15

## 2024-04-15 RX ORDER — MECLIZINE HYDROCHLORIDE 25 MG/1
25 TABLET ORAL 3 TIMES DAILY PRN
Qty: 60 TABLET | Refills: 1 | Status: SHIPPED | OUTPATIENT
Start: 2024-04-15

## 2024-04-15 NOTE — ASSESSMENT & PLAN NOTE
Patient was seen in the ER for epistaxis.  She has had about 6 episodes since then.  I will refer her to Dr. Pal for nasopharyngoscopy.

## 2024-04-15 NOTE — ASSESSMENT & PLAN NOTE
Blood pressure has been intermittently elevated.  I am going to have them check her blood pressure 3 times a day.  They see intermittent spikes come back in.  If it stays well-controlled just follow-up at next 3 months appointment.

## 2024-04-15 NOTE — PROGRESS NOTES
Patient Name: Shelley Leach  : 1955   MRN: [id number]     Chief Complaint:    Chief Complaint   Patient presents with    Follow up Nose Bleed     Nasal Congestion     Onset 1 week     Cough     Onset 1 week     Dizziness     Onset 1 week        History of Present Illness: Shelley Leach is a 69 y.o. female who is here today for follow up from ER  HPI        Review of Systems:   Review of Systems   Constitutional: Negative.    HENT:  Positive for nosebleeds.    Eyes: Negative.    Respiratory: Negative.     Cardiovascular: Negative.    Gastrointestinal: Negative.    Neurological: Negative.         Past Medical History:   Past Medical History:   Diagnosis Date    Allergic     Anemia     Anxiety     Arthritis     Bilateral carotid artery stenosis     Cataract 2016    Surgery    Cholelithiasis 1976    Surgery    Clostridioides difficile infection     15 YEARS AGO, TREATED    Colon polyp     Coronary artery disease     Depression     Dietary counseling     diet education-Abstracted from [facility]    Dietary counseling and surveillance     Diverticulosis     Elevated cholesterol     GERD (gastroesophageal reflux disease)     Goiter     Heart murmur     History of thyroid disorder     Thyroid problem-Abstracted from [facility]    History of transfusion     NO REACTION    Hypertension     Hyperthyroidism     Hypoglycemia associated with type 2 diabetes mellitus 2020    Hypothyroidism     Kidney stone 1981    Leaky heart valve     Obesity 1995    Gastric bypass in 2018    Peptic ulceration 2019    After gastric bypass    PONV (postoperative nausea and vomiting)     Renal insufficiency     Type 2 diabetes mellitus, uncontrolled     Visual impairment 1968    Vitamin D deficiency        Past Surgical History:   Past Surgical History:   Procedure Laterality Date    ADENOIDECTOMY      BARIATRIC SURGERY  10/18    BLEPHAROPLASTY Bilateral     CARDIAC  CATHETERIZATION      NO INTERVENTION-20 YRS AGO    CAROTID ARTERY ANGIOPLASTY N/A     CAROTID ENDARTERECTOMY Right 2021    Procedure: CAROTID ENDARTERECTOMY WITH EEG RIGHT;  Surgeon: Javed Morris MD;  Location:  [facility];  Service: Vascular;  Laterality: Right;    CARPAL TUNNEL RELEASE Bilateral     CATARACT EXTRACTION      CHOLECYSTECTOMY      COLONOSCOPY      GASTRIC BYPASS      HERNIA REPAIR  2019    HYSTERECTOMY      INTERVENTIONAL RADIOLOGY PROCEDURE Bilateral 2021    Procedure: Carotid Cerebral Angiogram;  Surgeon: Sukumar Francis MD;  Location:  [facility];  Service: Interventional Radiology;  Laterality: Bilateral;    JOINT REPLACEMENT      Both shoulders    OVARIAN CYST SURGERY      x 2    ROTATOR CUFF REPAIR Left     SUBTOTAL HYSTERECTOMY      THYROIDECTOMY, PARTIAL      TONSILLECTOMY         Family History:   Family History   Problem Relation Age of Onset    Diabetes Mother             Anemia Mother     Cancer Mother             Hypertension Mother             Anxiety disorder Mother             Arthritis Mother             Depression Mother             Hearing loss Mother             Miscarriages / Stillbirths Mother         -stillborn twins    Stroke Mother          from massive stroke 2023    Vision loss Mother             Diabetes Father             Hypertension Father             Thyroid disease Father             Kidney disease Father             Cancer Father         Decreased    Obesity Father     Arthritis Father             Hearing loss Father             Heart disease Father             Kidney disease Maternal Grandfather             Hypertension Maternal Grandmother             Obesity Paternal Grandmother     Cancer Maternal Aunt             Cancer Paternal Aunt         Breast     Diabetes Paternal Aunt     Cancer Maternal Uncle         Bladder    Diabetes Maternal Uncle     Hypertension Maternal Uncle     Kidney disease Maternal Uncle     Obesity Maternal Uncle     Cancer Maternal Aunt         Breast-    Diabetes Maternal Aunt             Diabetes Brother             Hypertension Brother             Kidney disease Brother             Obesity Brother     Cancer Brother             Thyroid disease Brother             Diabetes Maternal Uncle             Diabetes Maternal Aunt             Early death Maternal Aunt             Asthma Brother         Decreased       Social History:   Social History     Socioeconomic History    Marital status:     Number of children: 2   Tobacco Use    Smoking status: Never    Smokeless tobacco: Never    Tobacco comments:     Father smoked until i was 8   Vaping Use    Vaping status: Never Used   Substance and Sexual Activity    Alcohol use: Never    Drug use: Never    Sexual activity: Yes     Partners: Male     Birth control/protection: Vasectomy, Hysterectomy       Medications:     Current Outpatient Medications:     aspirin 81 MG EC tablet, Take 1 tablet by mouth Daily., Disp: , Rfl:     atorvastatin (LIPITOR) 10 MG tablet, Take 1 tablet by mouth Every Night., Disp: 90 tablet, Rfl: 1    Biotin 50563 MCG tablet dispersible, Place 10,000 mcg on the tongue 2 (two) times a day., Disp: , Rfl:     Blood Glucose Monitoring Suppl (Accu-Chek Guide) w/Device kit, Use 1 kit See Admin Instructions. Use to check blood sugar 4 times daily.  Dx E11.649, on insulin, Disp: 1 kit, Rfl: 0    buPROPion XL (WELLBUTRIN XL) 300 MG 24 hr tablet, Take 1 tablet by mouth Daily., Disp: 90 tablet, Rfl: 1    cetirizine (zyrTEC) 10 MG tablet, TAKE 1 TABLET EVERY DAY, Disp: 90 tablet, Rfl: 10    clopidogrel (PLAVIX) 75 MG tablet, Take 1 tablet by mouth Daily., Disp: 90 tablet, Rfl: 1    Continuous Blood Gluc  Sensor (Dexcom G6 Sensor), Use Every 10 (Ten) Days. Every ten days, Disp: 9 each, Rfl: 3    Continuous Blood Gluc Transmit (Dexcom G6 Transmitter) misc, Use 1 each Every 3 (Three) Months., Disp: 1 each, Rfl: 3    Cyanocobalamin (B-12 Compliance Injection) 1000 MCG/ML kit, Inject  as directed., Disp: , Rfl:     estradiol (ESTRACE) 0.1 MG/GM vaginal cream, INSERT 1G VAGINALY TWICE A WEEK AT BEDTIME, Disp: , Rfl:     famotidine (PEPCID) 40 MG tablet, , Disp: , Rfl:     gabapentin (NEURONTIN) 300 MG capsule, Take 1 capsule by mouth 3 (Three) Times a Day., Disp: 90 capsule, Rfl: 2    Glucagon (Gvoke HypoPen 2-Pack) 1 MG/0.2ML solution auto-injector, Inject 1 dose under the skin into the appropriate area as directed As Needed (for hypoglycemia)., Disp: 1 mL, Rfl: 3    glucose blood (Accu-Chek Guide) test strip, Check blood sugar 3 times daily dx e11.65 on insulin pump, Disp: 300 each, Rfl: 3    Insulin Aspart (novoLOG) 100 UNIT/ML injection, 100 units per day via pump, Disp: 90 mL, Rfl: 3    Insulin Disposable Pump (Omnipod 5 G6 Pods, Gen 5,) misc, USE 1 POD EVERY 72 HOURS., Disp: 30 each, Rfl: 3    Lancets 33G misc, Use 1 each 3 (Three) Times a Day. Dx e11.65 on insulin pump, Disp: 300 each, Rfl: 3    levothyroxine (SYNTHROID, LEVOTHROID) 100 MCG tablet, TAKE 1 TABLET EVERY DAY, Disp: 90 tablet, Rfl: 1    LORazepam (ATIVAN) 1 MG tablet, Take 1 tablet by mouth Every 8 (Eight) Hours As Needed for Anxiety., Disp: 30 tablet, Rfl: 2    losartan (COZAAR) 100 MG tablet, Take 1 tablet by mouth Daily., Disp: , Rfl:     meclizine (ANTIVERT) 25 MG tablet, Take 1 tablet by mouth 3 (Three) Times a Day As Needed for Dizziness., Disp: 60 tablet, Rfl: 1    Mirabegron ER (Myrbetriq) 25 MG tablet sustained-release 24 hour 24 hr tablet, Take 1 tablet by mouth Daily., Disp: 90 tablet, Rfl: 1    multivitamin (THERAGRAN) tablet tablet, take 1 tablet by oral route  every day with food, Disp: , Rfl:     Probiotic Product (TRUBIOTICS PO), Take 1  "capsule by mouth Daily., Disp: , Rfl:     Repatha solution prefilled syringe injection, INJECT 1 ML UNDER THE SKIN INTO THE APPROPRIATE AREA AS DIRECTED EVERY 14  DAYS., Disp: 6 mL, Rfl: 10    sertraline (Zoloft) 25 MG tablet, Take 1 tablet by mouth Daily., Disp: 90 tablet, Rfl: 1    Current Facility-Administered Medications:     cyanocobalamin injection 1,000 mcg, 1,000 mcg, Intramuscular, Q28 Days, Tom Salas MD, 1,000 mcg at 03/30/23 1015    cyanocobalamin injection 1,000 mcg, 1,000 mcg, Intramuscular, Q28 Days, Tom Salas MD    cyanocobalamin injection 1,000 mcg, 1,000 mcg, Intramuscular, Q28 Days, Tom Salas MD, 1,000 mcg at 12/28/23 1033    Facility-Administered Medications Ordered in Other Visits:     Chlorhexidine Gluconate Cloth 2 % pads 1 application, 1 application , Topical, Q12H PRN, Sarah Wan PA-C    Chlorhexidine Gluconate Cloth 2 % pads 1 application, 1 application , Topical, Q12H PRN, Jeffery Sumner PA    Allergies:   Allergies   Allergen Reactions    Percocet [Oxycodone-Acetaminophen] Itching    Morphine Other (See Comments)     HYPOTENSION    Sulfamethoxazole-Trimethoprim Swelling    Penicillins Rash         Physical Exam:  Vital Signs:   Vitals:    04/15/24 1050   BP: 102/62   BP Location: Left arm   Patient Position: Sitting   Cuff Size: Adult   Pulse: 63   Resp: 16   SpO2: 93%   Weight: 65.3 kg (143 lb 14.4 oz)   Height: 152.4 cm (60\")     Body mass index is 28.1 kg/m².     Physical Exam  Vitals and nursing note reviewed.   Constitutional:       Appearance: Normal appearance. She is normal weight.   HENT:      Head: Normocephalic and atraumatic.      Right Ear: Tympanic membrane, ear canal and external ear normal.      Left Ear: Tympanic membrane, ear canal and external ear normal.      Nose: Nose normal.      Mouth/Throat:      Mouth: Mucous membranes are dry.      Pharynx: Oropharynx is clear.   Eyes:      Extraocular Movements: Extraocular movements intact.     "  Conjunctiva/sclera: Conjunctivae normal.      Pupils: Pupils are equal, round, and reactive to light.   Cardiovascular:      Rate and Rhythm: Normal rate and regular rhythm.      Pulses: Normal pulses.      Heart sounds: Normal heart sounds.   Pulmonary:      Effort: Pulmonary effort is normal.      Breath sounds: Normal breath sounds.   Musculoskeletal:      Cervical back: Normal range of motion and neck supple.   Feet:      Comments:      Neurological:      Mental Status: She is alert.         Procedures      Assessment/Plan:   Diagnoses and all orders for this visit:    1. Epistaxis (Primary)  Assessment & Plan:  Patient was seen in the ER for epistaxis.  She has had about 6 episodes since then.  I will refer her to Dr. Pal for nasopharyngoscopy.    Orders:  -     Ambulatory Referral to ENT (Otolaryngology)    2. Vestibular dysfunction, unspecified laterality  Assessment & Plan:  For to Dr. Santos for Epley maneuver.    Orders:  -     Ambulatory Referral to Physical Therapy Evaluate and treat, Vestibular    3. Hypertension, essential  Assessment & Plan:  Blood pressure has been intermittently elevated.  I am going to have them check her blood pressure 3 times a day.  They see intermittent spikes come back in.  If it stays well-controlled just follow-up at next 3 months appointment.      Other orders  -     Discontinue: meclizine (ANTIVERT) 25 MG tablet; Take 1 tablet by mouth 3 (Three) Times a Day As Needed for Dizziness.  Dispense: 60 tablet; Refill: 1  -     meclizine (ANTIVERT) 25 MG tablet; Take 1 tablet by mouth 3 (Three) Times a Day As Needed for Dizziness.  Dispense: 60 tablet; Refill: 1             Follow Up:   No follow-ups on file.      Tom Salas MD  [facility]

## 2024-04-22 RX ORDER — BUPROPION HYDROCHLORIDE 300 MG/1
300 TABLET ORAL DAILY
Qty: 90 TABLET | Refills: 0 | Status: SHIPPED | OUTPATIENT
Start: 2024-04-22

## 2024-05-07 ENCOUNTER — OFFICE VISIT (OUTPATIENT)
Dept: FAMILY MEDICINE CLINIC | Facility: CLINIC | Age: 69
End: 2024-05-07
Payer: MEDICARE

## 2024-05-07 VITALS
OXYGEN SATURATION: 97 % | SYSTOLIC BLOOD PRESSURE: 153 MMHG | HEIGHT: 60 IN | DIASTOLIC BLOOD PRESSURE: 80 MMHG | WEIGHT: 144 LBS | RESPIRATION RATE: 16 BRPM | HEART RATE: 61 BPM | BODY MASS INDEX: 28.27 KG/M2

## 2024-05-07 DIAGNOSIS — R51.9 NONINTRACTABLE HEADACHE, UNSPECIFIED CHRONICITY PATTERN, UNSPECIFIED HEADACHE TYPE: ICD-10-CM

## 2024-05-07 DIAGNOSIS — I10 HYPERTENSION, ESSENTIAL: Primary | ICD-10-CM

## 2024-05-07 PROCEDURE — 99214 OFFICE O/P EST MOD 30 MIN: CPT | Performed by: FAMILY MEDICINE

## 2024-05-07 PROCEDURE — 3077F SYST BP >= 140 MM HG: CPT | Performed by: FAMILY MEDICINE

## 2024-05-07 PROCEDURE — 3079F DIAST BP 80-89 MM HG: CPT | Performed by: FAMILY MEDICINE

## 2024-05-07 PROCEDURE — 1126F AMNT PAIN NOTED NONE PRSNT: CPT | Performed by: FAMILY MEDICINE

## 2024-05-07 PROCEDURE — G2211 COMPLEX E/M VISIT ADD ON: HCPCS | Performed by: FAMILY MEDICINE

## 2024-05-07 PROCEDURE — 3051F HG A1C>EQUAL 7.0%<8.0%: CPT | Performed by: FAMILY MEDICINE

## 2024-05-07 RX ORDER — AMLODIPINE BESYLATE 5 MG/1
5 TABLET ORAL DAILY
Qty: 90 TABLET | Refills: 1 | Status: SHIPPED | OUTPATIENT
Start: 2024-05-07

## 2024-05-09 DIAGNOSIS — F43.29 ADJUSTMENT DISORDER WITH OTHER SYMPTOM: Primary | ICD-10-CM

## 2024-05-09 RX ORDER — BUPROPION HYDROCHLORIDE 300 MG/1
300 TABLET ORAL DAILY
Qty: 90 TABLET | Refills: 2 | Status: SHIPPED | OUTPATIENT
Start: 2024-05-09

## 2024-05-13 DIAGNOSIS — F41.9 ANXIETY: ICD-10-CM

## 2024-05-13 DIAGNOSIS — F43.29 ADJUSTMENT DISORDER WITH OTHER SYMPTOM: ICD-10-CM

## 2024-05-14 RX ORDER — BUPROPION HYDROCHLORIDE 300 MG/1
300 TABLET ORAL DAILY
Qty: 90 TABLET | Refills: 2 | OUTPATIENT
Start: 2024-05-14

## 2024-05-14 RX ORDER — LORAZEPAM 1 MG/1
1 TABLET ORAL EVERY 8 HOURS PRN
Qty: 30 TABLET | Refills: 2 | OUTPATIENT
Start: 2024-05-14

## 2024-05-20 RX ORDER — EVOLOCUMAB 140 MG/ML
140 INJECTION, SOLUTION SUBCUTANEOUS
Qty: 6 ML | Refills: 10 | Status: SHIPPED | OUTPATIENT
Start: 2024-05-20

## 2024-05-28 ENCOUNTER — OFFICE VISIT (OUTPATIENT)
Dept: FAMILY MEDICINE CLINIC | Facility: CLINIC | Age: 69
End: 2024-05-28
Payer: MEDICARE

## 2024-05-28 VITALS
WEIGHT: 146.6 LBS | BODY MASS INDEX: 28.78 KG/M2 | SYSTOLIC BLOOD PRESSURE: 122 MMHG | DIASTOLIC BLOOD PRESSURE: 78 MMHG | HEART RATE: 64 BPM | HEIGHT: 60 IN | OXYGEN SATURATION: 99 %

## 2024-05-28 DIAGNOSIS — E55.9 VITAMIN D DEFICIENCY: ICD-10-CM

## 2024-05-28 DIAGNOSIS — R51.9 NONINTRACTABLE HEADACHE, UNSPECIFIED CHRONICITY PATTERN, UNSPECIFIED HEADACHE TYPE: ICD-10-CM

## 2024-05-28 DIAGNOSIS — F41.9 ANXIETY: ICD-10-CM

## 2024-05-28 DIAGNOSIS — E11.65 UNCONTROLLED TYPE 2 DIABETES MELLITUS WITH HYPERGLYCEMIA: ICD-10-CM

## 2024-05-28 DIAGNOSIS — E53.8 B12 DEFICIENCY: ICD-10-CM

## 2024-05-28 DIAGNOSIS — I10 HYPERTENSION, ESSENTIAL: Primary | ICD-10-CM

## 2024-05-28 DIAGNOSIS — E03.9 ACQUIRED HYPOTHYROIDISM: ICD-10-CM

## 2024-05-28 PROCEDURE — 3051F HG A1C>EQUAL 7.0%<8.0%: CPT | Performed by: FAMILY MEDICINE

## 2024-05-28 PROCEDURE — G2211 COMPLEX E/M VISIT ADD ON: HCPCS | Performed by: FAMILY MEDICINE

## 2024-05-28 PROCEDURE — 99214 OFFICE O/P EST MOD 30 MIN: CPT | Performed by: FAMILY MEDICINE

## 2024-05-28 PROCEDURE — 3078F DIAST BP <80 MM HG: CPT | Performed by: FAMILY MEDICINE

## 2024-05-28 PROCEDURE — 1126F AMNT PAIN NOTED NONE PRSNT: CPT | Performed by: FAMILY MEDICINE

## 2024-05-28 PROCEDURE — 3074F SYST BP LT 130 MM HG: CPT | Performed by: FAMILY MEDICINE

## 2024-05-28 RX ORDER — LORAZEPAM 1 MG/1
1 TABLET ORAL EVERY 8 HOURS PRN
Qty: 30 TABLET | Refills: 2 | Status: SHIPPED | OUTPATIENT
Start: 2024-05-28

## 2024-05-28 RX ORDER — CYANOCOBALAMIN 1000 UG/ML
1000 INJECTION, SOLUTION INTRAMUSCULAR; SUBCUTANEOUS
Status: SHIPPED | OUTPATIENT
Start: 2024-05-28

## 2024-05-28 RX ADMIN — CYANOCOBALAMIN 1000 MCG: 1000 INJECTION, SOLUTION INTRAMUSCULAR; SUBCUTANEOUS at 09:39

## 2024-05-28 NOTE — ASSESSMENT & PLAN NOTE
Refill Ativan.  She has a daughter has metastatic bladder cancer another daughter that is bipolar.  Leads to issues and causing her severe distress.

## 2024-05-28 NOTE — ASSESSMENT & PLAN NOTE
Blood pressure is well-controlled today.  Her cuff did not agree with our measurement in the office.  She is going to change her cuffs out.  We will recheck her in 3 months.

## 2024-05-28 NOTE — PROGRESS NOTES
Patient Name: Shelley Leach  : 1955   MRN: [id number]     Chief Complaint:    Chief Complaint   Patient presents with    Hypertension     F/u; rachel needs PA for ativan; would like samples of myrbetric if avail.       History of Present Illness: Shelley Leach is a 69 y.o. female who is here today for follow up on anxiety and BP  Hypertension  Associated symptoms include headaches and palpitations. Pertinent negatives include no chest pain or shortness of breath.           Review of Systems:   Review of Systems   Respiratory:  Negative for shortness of breath.    Cardiovascular:  Positive for palpitations. Negative for chest pain.   Neurological:  Positive for headaches.        Past Medical History:   Past Medical History:   Diagnosis Date    Allergic     Anemia     Anxiety     Arthritis     Bilateral carotid artery stenosis     Cataract 2016    Surgery    Cholelithiasis 1976    Surgery    Clostridioides difficile infection     15 YEARS AGO, TREATED    Colon polyp     Coronary artery disease     Depression     Dietary counseling     diet education-Abstracted from [place]    Dietary counseling and surveillance     Diverticulosis     Elevated cholesterol     GERD (gastroesophageal reflux disease)     Goiter     Heart murmur     History of thyroid disorder     Thyroid problem-Abstracted from [place]    History of transfusion     NO REACTION    Hypertension     Hyperthyroidism 1996    Hypoglycemia associated with type 2 diabetes mellitus 2020    Hypothyroidism     Kidney stone 1981    Leaky heart valve     Obesity 1995    Gastric bypass in 2018    Peptic ulceration 2019    After gastric bypass    PONV (postoperative nausea and vomiting)     Renal insufficiency 2000    Type 2 diabetes mellitus, uncontrolled     Visual impairment 1968    Vitamin D deficiency        Past Surgical History:   Past Surgical History:   Procedure Laterality Date     ADENOIDECTOMY      BARIATRIC SURGERY  10/18    BLEPHAROPLASTY Bilateral     CARDIAC CATHETERIZATION      NO INTERVENTION-20 YRS AGO    CAROTID ARTERY ANGIOPLASTY N/A     CAROTID ENDARTERECTOMY Right 2021    Procedure: CAROTID ENDARTERECTOMY WITH EEG RIGHT;  Surgeon: Javed Morris MD;  Location:  [facility];  Service: Vascular;  Laterality: Right;    CARPAL TUNNEL RELEASE Bilateral     CATARACT EXTRACTION      CHOLECYSTECTOMY      COLONOSCOPY      GASTRIC BYPASS      HERNIA REPAIR  2019    HYSTERECTOMY      INTERVENTIONAL RADIOLOGY PROCEDURE Bilateral 2021    Procedure: Carotid Cerebral Angiogram;  Surgeon: Sukumar Francis MD;  Location:  [facility];  Service: Interventional Radiology;  Laterality: Bilateral;    JOINT REPLACEMENT      Both shoulders    OVARIAN CYST SURGERY      x 2    ROTATOR CUFF REPAIR Left     SUBTOTAL HYSTERECTOMY  1987    THYROIDECTOMY, PARTIAL      TONSILLECTOMY         Family History:   Family History   Problem Relation Age of Onset    Diabetes Mother             Anemia Mother     Cancer Mother             Hypertension Mother             Anxiety disorder Mother             Arthritis Mother             Depression Mother             Hearing loss Mother             Miscarriages / Stillbirths Mother         -stillborn twins    Stroke Mother          from massive stroke 2023    Vision loss Mother             Diabetes Father             Hypertension Father             Thyroid disease Father             Kidney disease Father             Cancer Father         Decreased    Obesity Father     Arthritis Father             Hearing loss Father             Heart disease Father             Kidney disease Maternal Grandfather             Hypertension Maternal Grandmother             Obesity Paternal Grandmother      Cancer Maternal Aunt             Cancer Paternal Aunt         Breast    Diabetes Paternal Aunt     Cancer Maternal Uncle         Bladder    Diabetes Maternal Uncle     Hypertension Maternal Uncle     Kidney disease Maternal Uncle     Obesity Maternal Uncle     Cancer Maternal Aunt         Breast-    Diabetes Maternal Aunt             Diabetes Brother             Hypertension Brother             Kidney disease Brother             Obesity Brother     Cancer Brother             Thyroid disease Brother             Diabetes Maternal Uncle             Diabetes Maternal Aunt             Early death Maternal Aunt             Asthma Brother         Decreased       Social History:   Social History     Socioeconomic History    Marital status:     Number of children: 2   Tobacco Use    Smoking status: Never    Smokeless tobacco: Never    Tobacco comments:     Father smoked until i was 8   Vaping Use    Vaping status: Never Used   Substance and Sexual Activity    Alcohol use: Never    Drug use: Never    Sexual activity: Yes     Partners: Male     Birth control/protection: Vasectomy, Hysterectomy       Medications:     Current Outpatient Medications:     amLODIPine (NORVASC) 5 MG tablet, Take 1 tablet by mouth Daily., Disp: 90 tablet, Rfl: 1    aspirin 81 MG EC tablet, Take 1 tablet by mouth Daily., Disp: , Rfl:     atorvastatin (LIPITOR) 10 MG tablet, Take 1 tablet by mouth Every Night., Disp: 90 tablet, Rfl: 1    Biotin 36593 MCG tablet dispersible, Place 10,000 mcg on the tongue 2 (two) times a day., Disp: , Rfl:     Blood Glucose Monitoring Suppl (Accu-Chek Guide) w/Device kit, Use 1 kit See Admin Instructions. Use to check blood sugar 4 times daily.  Dx E11.649, on insulin, Disp: 1 kit, Rfl: 0    buPROPion XL (WELLBUTRIN XL) 300 MG 24 hr tablet, Take 1 tablet by mouth Daily., Disp: 90 tablet, Rfl: 2    cetirizine (zyrTEC) 10 MG  tablet, TAKE 1 TABLET EVERY DAY, Disp: 90 tablet, Rfl: 10    clopidogrel (PLAVIX) 75 MG tablet, Take 1 tablet by mouth Daily., Disp: 90 tablet, Rfl: 1    Continuous Blood Gluc Sensor (Dexcom G6 Sensor), Use Every 10 (Ten) Days. Every ten days, Disp: 9 each, Rfl: 3    Continuous Blood Gluc Transmit (Dexcom G6 Transmitter) misc, Use 1 each Every 3 (Three) Months., Disp: 1 each, Rfl: 3    Cyanocobalamin (B-12 Compliance Injection) 1000 MCG/ML kit, Inject  as directed., Disp: , Rfl:     estradiol (ESTRACE) 0.1 MG/GM vaginal cream, INSERT 1G VAGINALY TWICE A WEEK AT BEDTIME, Disp: , Rfl:     Evolocumab (Repatha) solution prefilled syringe injection, Inject 1 mL under the skin into the appropriate area as directed Every 14 (Fourteen) Days., Disp: 6 mL, Rfl: 10    famotidine (PEPCID) 40 MG tablet, , Disp: , Rfl:     gabapentin (NEURONTIN) 300 MG capsule, Take 1 capsule by mouth 3 (Three) Times a Day., Disp: 90 capsule, Rfl: 2    Glucagon (Gvoke HypoPen 2-Pack) 1 MG/0.2ML solution auto-injector, Inject 1 dose under the skin into the appropriate area as directed As Needed (for hypoglycemia)., Disp: 1 mL, Rfl: 3    glucose blood (Accu-Chek Guide) test strip, Check blood sugar 3 times daily dx e11.65 on insulin pump, Disp: 300 each, Rfl: 3    Insulin Aspart (novoLOG) 100 UNIT/ML injection, 100 units per day via pump, Disp: 90 mL, Rfl: 3    Insulin Disposable Pump (Omnipod 5 G6 Pods, Gen 5,) misc, USE 1 POD EVERY 72 HOURS., Disp: 30 each, Rfl: 3    Lancets 33G misc, Use 1 each 3 (Three) Times a Day. Dx e11.65 on insulin pump, Disp: 300 each, Rfl: 3    levothyroxine (SYNTHROID, LEVOTHROID) 100 MCG tablet, TAKE 1 TABLET EVERY DAY, Disp: 90 tablet, Rfl: 1    LORazepam (ATIVAN) 1 MG tablet, Take 1 tablet by mouth Every 8 (Eight) Hours As Needed for Anxiety., Disp: 30 tablet, Rfl: 2    losartan (COZAAR) 100 MG tablet, Take 1 tablet by mouth Daily., Disp: , Rfl:     meclizine (ANTIVERT) 25 MG tablet, Take 1 tablet by mouth 3 (Three)  "Times a Day As Needed for Dizziness., Disp: 60 tablet, Rfl: 1    Mirabegron ER (Myrbetriq) 25 MG tablet sustained-release 24 hour 24 hr tablet, Take 1 tablet by mouth Daily., Disp: 90 tablet, Rfl: 1    multivitamin (THERAGRAN) tablet tablet, take 1 tablet by oral route  every day with food, Disp: , Rfl:     Probiotic Product (TRUBIOTICS PO), Take 1 capsule by mouth Daily., Disp: , Rfl:     sertraline (Zoloft) 25 MG tablet, Take 1 tablet by mouth Daily., Disp: 90 tablet, Rfl: 1    Current Facility-Administered Medications:     cyanocobalamin injection 1,000 mcg, 1,000 mcg, Intramuscular, Q28 Days, Tom Salas MD, 1,000 mcg at 03/30/23 1015    cyanocobalamin injection 1,000 mcg, 1,000 mcg, Intramuscular, Q28 Days, Tom Salas MD    cyanocobalamin injection 1,000 mcg, 1,000 mcg, Intramuscular, Q28 Days, Tom Salas MD, 1,000 mcg at 12/28/23 1033    cyanocobalamin injection 1,000 mcg, 1,000 mcg, Intramuscular, Q28 Days, Tom Salas MD    Facility-Administered Medications Ordered in Other Visits:     Chlorhexidine Gluconate Cloth 2 % pads 1 application, 1 application , Topical, Q12H PRN, Sarah Wan PA-C    Chlorhexidine Gluconate Cloth 2 % pads 1 application, 1 application , Topical, Q12H PRN, Jeffery Sumner PA    Allergies:   Allergies   Allergen Reactions    Percocet [Oxycodone-Acetaminophen] Itching    Morphine Other (See Comments)     HYPOTENSION    Sulfamethoxazole-Trimethoprim Swelling    Penicillins Rash         Physical Exam:  Vital Signs:   Vitals:    05/28/24 0854   BP: 122/78  Comment: 191/99 on home monitor, same arm, for comparison   BP Location: Right arm   Patient Position: Sitting   Cuff Size: Adult   Pulse: 64   SpO2: 99%   Weight: 66.5 kg (146 lb 9.6 oz)   Height: 152.4 cm (60\")   PainSc: 0-No pain     Body mass index is 28.63 kg/m².     Physical Exam  Vitals and nursing note reviewed.   Constitutional:       Appearance: Normal appearance. She is normal weight.   HENT: "      Head: Normocephalic and atraumatic.      Right Ear: Tympanic membrane, ear canal and external ear normal.      Left Ear: Tympanic membrane, ear canal and external ear normal.      Nose: Nose normal.      Mouth/Throat:      Mouth: Mucous membranes are dry.      Pharynx: Oropharynx is clear.   Eyes:      Extraocular Movements: Extraocular movements intact.      Conjunctiva/sclera: Conjunctivae normal.      Pupils: Pupils are equal, round, and reactive to light.   Cardiovascular:      Rate and Rhythm: Normal rate and regular rhythm.      Pulses: Normal pulses.      Heart sounds: Normal heart sounds.   Pulmonary:      Effort: Pulmonary effort is normal.      Breath sounds: Normal breath sounds.   Musculoskeletal:      Cervical back: Normal range of motion and neck supple.   Feet:      Comments:      Neurological:      Mental Status: She is alert.         Procedures      Assessment/Plan:   Diagnoses and all orders for this visit:    1. Hypertension, essential (Primary)  Assessment & Plan:  Blood pressure is well-controlled today.  Her cuff did not agree with our measurement in the office.  She is going to change her cuffs out.  We will recheck her in 3 months.    Orders:  -     Hemoglobin A1c; Future  -     Lipid Panel; Future  -     Comprehensive Metabolic Panel; Future  -     Vitamin B12; Future  -     Vitamin D,25-Hydroxy; Future  -     TSH Rfx On Abnormal To Free T4; Future  -     CBC & Differential; Future    2. B12 deficiency  Assessment & Plan:  Will give an injection today.    Orders:  -     cyanocobalamin injection 1,000 mcg  -     Hemoglobin A1c; Future  -     Lipid Panel; Future  -     Comprehensive Metabolic Panel; Future  -     Vitamin B12; Future  -     Vitamin D,25-Hydroxy; Future  -     TSH Rfx On Abnormal To Free T4; Future  -     CBC & Differential; Future    3. Nonintractable headache, unspecified chronicity pattern, unspecified headache type  Assessment & Plan:  These have improved.    Orders:  -      Hemoglobin A1c; Future  -     Lipid Panel; Future  -     Comprehensive Metabolic Panel; Future  -     Vitamin B12; Future  -     Vitamin D,25-Hydroxy; Future  -     TSH Rfx On Abnormal To Free T4; Future  -     CBC & Differential; Future    4. Anxiety  Assessment & Plan:  Refill Ativan.  She has a daughter has metastatic bladder cancer another daughter that is bipolar.  Leads to issues and causing her severe distress.    Orders:  -     LORazepam (ATIVAN) 1 MG tablet; Take 1 tablet by mouth Every 8 (Eight) Hours As Needed for Anxiety.  Dispense: 30 tablet; Refill: 2  -     Hemoglobin A1c; Future  -     Lipid Panel; Future  -     Comprehensive Metabolic Panel; Future  -     Vitamin B12; Future  -     Vitamin D,25-Hydroxy; Future  -     TSH Rfx On Abnormal To Free T4; Future  -     CBC & Differential; Future    5. Acquired hypothyroidism  -     Hemoglobin A1c; Future  -     Lipid Panel; Future  -     Comprehensive Metabolic Panel; Future  -     Vitamin B12; Future  -     Vitamin D,25-Hydroxy; Future  -     TSH Rfx On Abnormal To Free T4; Future  -     CBC & Differential; Future    6. Uncontrolled type 2 diabetes mellitus with hyperglycemia  -     Hemoglobin A1c; Future  -     Lipid Panel; Future  -     Comprehensive Metabolic Panel; Future  -     Vitamin B12; Future  -     Vitamin D,25-Hydroxy; Future  -     TSH Rfx On Abnormal To Free T4; Future  -     CBC & Differential; Future    7. Vitamin D deficiency  -     Hemoglobin A1c; Future  -     Lipid Panel; Future  -     Comprehensive Metabolic Panel; Future  -     Vitamin B12; Future  -     Vitamin D,25-Hydroxy; Future  -     TSH Rfx On Abnormal To Free T4; Future  -     CBC & Differential; Future             Follow Up:   Return in about 4 months (around 9/28/2024) for Annual physical, Bloodwork 1 week prior to next appointment.      Tom Salas MD  [facility]     Answers submitted by the patient for this visit:  Primary Reason for Visit  (Submitted on 5/21/2024)  What is the primary reason for your visit?: High Blood Pressure

## 2024-06-17 DIAGNOSIS — E03.9 ACQUIRED HYPOTHYROIDISM: ICD-10-CM

## 2024-06-18 ENCOUNTER — TELEPHONE (OUTPATIENT)
Dept: CARDIOLOGY | Facility: CLINIC | Age: 69
End: 2024-06-18
Payer: MEDICARE

## 2024-06-18 ENCOUNTER — TELEPHONE (OUTPATIENT)
Dept: ENDOCRINOLOGY | Facility: CLINIC | Age: 69
End: 2024-06-18
Payer: MEDICARE

## 2024-06-18 RX ORDER — SERTRALINE HYDROCHLORIDE 25 MG/1
25 TABLET, FILM COATED ORAL DAILY
Qty: 90 TABLET | Refills: 0 | Status: SHIPPED | OUTPATIENT
Start: 2024-06-18

## 2024-06-18 RX ORDER — LEVOTHYROXINE SODIUM 0.1 MG/1
TABLET ORAL
Qty: 90 TABLET | Refills: 0 | Status: SHIPPED | OUTPATIENT
Start: 2024-06-18

## 2024-06-18 NOTE — LETTER
06/18/24        Fax # - [phone] and [phone]    Re: Wesleysarina Leach, 1955   Procedure/Surgery - Right total knee replacment        Dear Dr. Jose D Cornejo,     Shelley Villegasperez Leach, 1955 is LOW RISK  for scheduled procedure/surgery from a cardiac/EP standpoint.  Any questions please call [phone].    [x]  Continue Aspirin   [x]  Hold Plavix 5 Days           Sincerely,          MD Kylie Camargo RN

## 2024-06-18 NOTE — TELEPHONE ENCOUNTER
PATIENT IS REQUESTING DR OHARA ADVISE HER ON HOW SHE SHOULD TAKE BE TAKING HER PLAVIX AND ASPIRIN AND WHEN SHE SHOULD DISCONTINUE IT BEFORE THE PROCEDURE. PATIENT STATES THE PROCEDURE IS NOW SCHEDULED FOR 7-3-24.

## 2024-06-18 NOTE — TELEPHONE ENCOUNTER
I can give her clearance in terms of her diabetes but she would need to get full medical clearance from dr pacheco so she should just contact his office

## 2024-06-18 NOTE — TELEPHONE ENCOUNTER
Left message for patient to return call.  If patient returns call, please read Dr. Talbot message.

## 2024-06-18 NOTE — TELEPHONE ENCOUNTER
REQUEST FOR CARDIAC CLEARANCE    Caller name: Shelley Leach     Phone Number: [phone]    Surgeon's name: DR. GILMER SCHWARZ    Type of planned surgery: TOTAL RIGHT KNEE REPLACEMENT    Date of planned surgery: WAITING ON CC    Type of anesthesia: GENERAL    Have you been experiencing chest pain or shortness of breath? NO    Is your doctor requesting for you to stop any of your medications prior to your surgery? BLOOD THINNERS    Where should we fax the clearance to? [phone]

## 2024-06-18 NOTE — TELEPHONE ENCOUNTER
Patient called back, read Dr. Talbot message. Stated she is needing diabetic clearance from Dr. Talbot.

## 2024-06-18 NOTE — TELEPHONE ENCOUNTER
Last seen 2/5/24- Carotid stenosis Hx right CEA Dr. Morris 2021, left ICA is occluded. Duplex 2/16/24- Unchanged compared to 2022. Pt is on Plavix.     Okay to proceed?

## 2024-07-03 DIAGNOSIS — E78.2 HYPERLIPIDEMIA, MIXED: ICD-10-CM

## 2024-07-03 RX ORDER — ATORVASTATIN CALCIUM 10 MG/1
10 TABLET, FILM COATED ORAL NIGHTLY
Qty: 90 TABLET | Refills: 1 | Status: SHIPPED | OUTPATIENT
Start: 2024-07-03

## 2024-07-08 ENCOUNTER — OFFICE VISIT (OUTPATIENT)
Dept: FAMILY MEDICINE CLINIC | Facility: CLINIC | Age: 69
End: 2024-07-08
Payer: MEDICARE

## 2024-07-08 VITALS
OXYGEN SATURATION: 98 % | DIASTOLIC BLOOD PRESSURE: 84 MMHG | WEIGHT: 148.9 LBS | HEART RATE: 68 BPM | RESPIRATION RATE: 16 BRPM | SYSTOLIC BLOOD PRESSURE: 122 MMHG | HEIGHT: 60 IN | BODY MASS INDEX: 29.23 KG/M2

## 2024-07-08 DIAGNOSIS — N18.32 CHRONIC KIDNEY DISEASE, STAGE 3B: ICD-10-CM

## 2024-07-08 DIAGNOSIS — E11.65 UNCONTROLLED TYPE 2 DIABETES MELLITUS WITH HYPERGLYCEMIA: ICD-10-CM

## 2024-07-08 DIAGNOSIS — R07.9 CHEST PAIN, UNSPECIFIED TYPE: Primary | ICD-10-CM

## 2024-07-08 DIAGNOSIS — I10 HYPERTENSION, ESSENTIAL: ICD-10-CM

## 2024-07-08 DIAGNOSIS — F41.9 ANXIETY: ICD-10-CM

## 2024-07-08 DIAGNOSIS — E55.9 VITAMIN D DEFICIENCY: ICD-10-CM

## 2024-07-08 DIAGNOSIS — E53.8 B12 DEFICIENCY: ICD-10-CM

## 2024-07-08 DIAGNOSIS — E03.9 ACQUIRED HYPOTHYROIDISM: ICD-10-CM

## 2024-07-08 DIAGNOSIS — R51.9 NONINTRACTABLE HEADACHE, UNSPECIFIED CHRONICITY PATTERN, UNSPECIFIED HEADACHE TYPE: ICD-10-CM

## 2024-07-08 PROCEDURE — 99214 OFFICE O/P EST MOD 30 MIN: CPT | Performed by: FAMILY MEDICINE

## 2024-07-08 PROCEDURE — 93000 ELECTROCARDIOGRAM COMPLETE: CPT | Performed by: FAMILY MEDICINE

## 2024-07-08 PROCEDURE — 3079F DIAST BP 80-89 MM HG: CPT | Performed by: FAMILY MEDICINE

## 2024-07-08 PROCEDURE — 3074F SYST BP LT 130 MM HG: CPT | Performed by: FAMILY MEDICINE

## 2024-07-08 PROCEDURE — 3051F HG A1C>EQUAL 7.0%<8.0%: CPT | Performed by: FAMILY MEDICINE

## 2024-07-08 PROCEDURE — 1126F AMNT PAIN NOTED NONE PRSNT: CPT | Performed by: FAMILY MEDICINE

## 2024-07-08 PROCEDURE — 36415 COLL VENOUS BLD VENIPUNCTURE: CPT | Performed by: FAMILY MEDICINE

## 2024-07-08 NOTE — ASSESSMENT & PLAN NOTE
Was seen in the ER for chest pain.  Had negative cardiac enzymes.  She is doing well since then.  Does have a history of diabetes.  EKG is okay.  It would be prudent to do a stress test and ongoing day 1 and have her come back in 1 month for recheck.

## 2024-07-08 NOTE — PROGRESS NOTES
Patient Name: Shelley Leach  : 1955   MRN: [id number]     Chief Complaint:    Chief Complaint   Patient presents with    ER Follow up       History of Present Illness: Shelley Leach is a 69 y.o. female who is here today for follow up from ER.  HPI        Review of Systems:   Review of Systems   Constitutional: Negative.    HENT: Negative.     Eyes: Negative.    Respiratory: Negative.  Positive for shortness of breath.    Cardiovascular: Negative.  Positive for chest pain.   Gastrointestinal: Negative.    Neurological: Negative.         Past Medical History:   Past Medical History:   Diagnosis Date    Allergic     Anemia     Anxiety     Arthritis     Bilateral carotid artery stenosis     Cataract 2016    Surgery    Cholelithiasis 1976    Surgery    Clostridioides difficile infection     15 YEARS AGO, TREATED    Colon polyp     Coronary artery disease     Depression     Dietary counseling     diet education-Abstracted from [place]    Dietary counseling and surveillance     Diverticulosis     Elevated cholesterol     GERD (gastroesophageal reflux disease)     Goiter     Heart murmur     History of thyroid disorder     Thyroid problem-Abstracted from [place]    History of transfusion     NO REACTION    Hypertension     Hyperthyroidism     Hypoglycemia associated with type 2 diabetes mellitus 2020    Hypothyroidism     Kidney stone 1981    Leaky heart valve     Obesity 1995    Gastric bypass in 2018    Peptic ulceration 2019    After gastric bypass    PONV (postoperative nausea and vomiting)     Renal insufficiency 2000    Type 2 diabetes mellitus, uncontrolled     Visual impairment 1968    Vitamin D deficiency        Past Surgical History:   Past Surgical History:   Procedure Laterality Date    ADENOIDECTOMY      BARIATRIC SURGERY  10/18    BLEPHAROPLASTY Bilateral     CARDIAC CATHETERIZATION      NO INTERVENTION-20 YRS AGO    CAROTID ARTERY  ANGIOPLASTY N/A     CAROTID ENDARTERECTOMY Right 2021    Procedure: CAROTID ENDARTERECTOMY WITH EEG RIGHT;  Surgeon: Javed Morris MD;  Location:  [facility];  Service: Vascular;  Laterality: Right;    CARPAL TUNNEL RELEASE Bilateral     CATARACT EXTRACTION      CHOLECYSTECTOMY      COLONOSCOPY      GASTRIC BYPASS      HERNIA REPAIR  2019    HYSTERECTOMY      INTERVENTIONAL RADIOLOGY PROCEDURE Bilateral 2021    Procedure: Carotid Cerebral Angiogram;  Surgeon: Sukumar Francis MD;  Location:  [facility];  Service: Interventional Radiology;  Laterality: Bilateral;    JOINT REPLACEMENT      Both shoulders    OVARIAN CYST SURGERY      x 2    ROTATOR CUFF REPAIR Left     SUBTOTAL HYSTERECTOMY      THYROIDECTOMY, PARTIAL      TONSILLECTOMY         Family History:   Family History   Problem Relation Age of Onset    Diabetes Mother             Anemia Mother     Cancer Mother             Hypertension Mother             Anxiety disorder Mother             Arthritis Mother             Depression Mother             Hearing loss Mother             Miscarriages / Stillbirths Mother         -stillborn twins    Stroke Mother          from massive stroke 2023    Vision loss Mother             Diabetes Father             Hypertension Father             Thyroid disease Father             Kidney disease Father             Cancer Father         Decreased    Obesity Father     Arthritis Father             Hearing loss Father             Heart disease Father             Kidney disease Maternal Grandfather             Hypertension Maternal Grandmother             Obesity Paternal Grandmother     Cancer Maternal Aunt             Cancer Paternal Aunt         Breast    Diabetes Paternal Aunt     Cancer Maternal Uncle         Bladder     Diabetes Maternal Uncle     Hypertension Maternal Uncle     Kidney disease Maternal Uncle     Obesity Maternal Uncle     Cancer Maternal Aunt         Breast-    Diabetes Maternal Aunt             Diabetes Brother             Hypertension Brother             Kidney disease Brother             Obesity Brother     Cancer Brother             Thyroid disease Brother             Diabetes Maternal Uncle             Diabetes Maternal Aunt             Early death Maternal Aunt             Asthma Brother         Decreased       Social History:   Social History     Socioeconomic History    Marital status:     Number of children: 2   Tobacco Use    Smoking status: Never    Smokeless tobacco: Never    Tobacco comments:     Father smoked until i was 8   Vaping Use    Vaping status: Never Used   Substance and Sexual Activity    Alcohol use: Never    Drug use: Never    Sexual activity: Yes     Partners: Male     Birth control/protection: Vasectomy, Hysterectomy       Medications:     Current Outpatient Medications:     amLODIPine (NORVASC) 5 MG tablet, Take 1 tablet by mouth Daily., Disp: 90 tablet, Rfl: 1    aspirin 81 MG EC tablet, Take 1 tablet by mouth Daily., Disp: , Rfl:     atorvastatin (LIPITOR) 10 MG tablet, TAKE 1 TABLET EVERY NIGHT, Disp: 90 tablet, Rfl: 1    Biotin 56234 MCG tablet dispersible, Place 10,000 mcg on the tongue 2 (two) times a day., Disp: , Rfl:     Blood Glucose Monitoring Suppl (Accu-Chek Guide) w/Device kit, Use 1 kit See Admin Instructions. Use to check blood sugar 4 times daily.  Dx E11.649, on insulin, Disp: 1 kit, Rfl: 0    buPROPion XL (WELLBUTRIN XL) 300 MG 24 hr tablet, Take 1 tablet by mouth Daily., Disp: 90 tablet, Rfl: 2    cetirizine (zyrTEC) 10 MG tablet, TAKE 1 TABLET EVERY DAY, Disp: 90 tablet, Rfl: 10    clopidogrel (PLAVIX) 75 MG tablet, Take 1 tablet by mouth Daily., Disp: 90 tablet, Rfl: 1     Continuous Blood Gluc Sensor (Dexcom G6 Sensor), Use Every 10 (Ten) Days. Every ten days, Disp: 9 each, Rfl: 3    Continuous Blood Gluc Transmit (Dexcom G6 Transmitter) misc, Use 1 each Every 3 (Three) Months., Disp: 1 each, Rfl: 3    Cyanocobalamin (B-12 Compliance Injection) 1000 MCG/ML kit, Inject  as directed., Disp: , Rfl:     estradiol (ESTRACE) 0.1 MG/GM vaginal cream, INSERT 1G VAGINALY TWICE A WEEK AT BEDTIME, Disp: , Rfl:     Evolocumab (Repatha) solution prefilled syringe injection, Inject 1 mL under the skin into the appropriate area as directed Every 14 (Fourteen) Days., Disp: 6 mL, Rfl: 10    famotidine (PEPCID) 40 MG tablet, , Disp: , Rfl:     gabapentin (NEURONTIN) 300 MG capsule, Take 1 capsule by mouth 3 (Three) Times a Day., Disp: 90 capsule, Rfl: 2    Glucagon (Gvoke HypoPen 2-Pack) 1 MG/0.2ML solution auto-injector, Inject 1 dose under the skin into the appropriate area as directed As Needed (for hypoglycemia)., Disp: 1 mL, Rfl: 3    glucose blood (Accu-Chek Guide) test strip, Check blood sugar 3 times daily dx e11.65 on insulin pump, Disp: 300 each, Rfl: 3    Insulin Aspart (novoLOG) 100 UNIT/ML injection, 100 units per day via pump, Disp: 90 mL, Rfl: 3    Insulin Disposable Pump (Omnipod 5 G6 Pods, Gen 5,) misc, USE 1 POD EVERY 72 HOURS., Disp: 30 each, Rfl: 3    Lancets 33G misc, Use 1 each 3 (Three) Times a Day. Dx e11.65 on insulin pump, Disp: 300 each, Rfl: 3    levothyroxine (SYNTHROID, LEVOTHROID) 100 MCG tablet, TAKE 1 TABLET EVERY DAY, Disp: 90 tablet, Rfl: 0    LORazepam (ATIVAN) 1 MG tablet, Take 1 tablet by mouth Every 8 (Eight) Hours As Needed for Anxiety., Disp: 30 tablet, Rfl: 2    meclizine (ANTIVERT) 25 MG tablet, Take 1 tablet by mouth 3 (Three) Times a Day As Needed for Dizziness., Disp: 60 tablet, Rfl: 1    Mirabegron ER (Myrbetriq) 25 MG tablet sustained-release 24 hour 24 hr tablet, Take 1 tablet by mouth Daily., Disp: 90 tablet, Rfl: 1    multivitamin (THERAGRAN) tablet  "tablet, take 1 tablet by oral route  every day with food, Disp: , Rfl:     Probiotic Product (TRUBIOTICS PO), Take 1 capsule by mouth Daily., Disp: , Rfl:     sertraline (ZOLOFT) 25 MG tablet, TAKE 1 TABLET EVERY DAY, Disp: 90 tablet, Rfl: 0    Current Facility-Administered Medications:     cyanocobalamin injection 1,000 mcg, 1,000 mcg, Intramuscular, Q28 Days, Tom Salas MD, 1,000 mcg at 03/30/23 1015    cyanocobalamin injection 1,000 mcg, 1,000 mcg, Intramuscular, Q28 Days, Tom Salas MD    cyanocobalamin injection 1,000 mcg, 1,000 mcg, Intramuscular, Q28 Days, Tom Salas MD, 1,000 mcg at 12/28/23 1033    cyanocobalamin injection 1,000 mcg, 1,000 mcg, Intramuscular, Q28 Days, Tom Salas MD, 1,000 mcg at 05/28/24 0939    Facility-Administered Medications Ordered in Other Visits:     Chlorhexidine Gluconate Cloth 2 % pads 1 application, 1 application , Topical, Q12H PRN, Sarah Wan PA-C    Chlorhexidine Gluconate Cloth 2 % pads 1 application, 1 application , Topical, Q12H PRN, Jeffery Sumner PA    Allergies:   Allergies   Allergen Reactions    Percocet [Oxycodone-Acetaminophen] Itching    Morphine Other (See Comments)     HYPOTENSION    Sulfamethoxazole-Trimethoprim Swelling    Penicillins Rash         Physical Exam:  Vital Signs:   Vitals:    07/08/24 1045   BP: 122/84   BP Location: Left arm   Patient Position: Sitting   Cuff Size: Adult   Pulse: 68   Resp: 16   SpO2: 98%   Weight: 67.5 kg (148 lb 14.4 oz)   Height: 152.4 cm (60\")     Body mass index is 29.08 kg/m².     Physical Exam  Vitals and nursing note reviewed.   Constitutional:       Appearance: Normal appearance. She is normal weight.   HENT:      Head: Normocephalic and atraumatic.      Right Ear: Tympanic membrane, ear canal and external ear normal.      Left Ear: Tympanic membrane, ear canal and external ear normal.      Nose: Nose normal.      Mouth/Throat:      Mouth: Mucous membranes are dry.      Pharynx: " Oropharynx is clear.   Eyes:      Extraocular Movements: Extraocular movements intact.      Conjunctiva/sclera: Conjunctivae normal.      Pupils: Pupils are equal, round, and reactive to light.   Cardiovascular:      Rate and Rhythm: Normal rate and regular rhythm.      Pulses: Normal pulses.      Heart sounds: Normal heart sounds.   Pulmonary:      Effort: Pulmonary effort is normal.      Breath sounds: Normal breath sounds.   Musculoskeletal:      Cervical back: Normal range of motion and neck supple.   Feet:      Comments:      Neurological:      Mental Status: She is alert.           ECG 12 Lead    Date/Time: 7/8/2024 11:15 AM  Performed by: Tom Salas MD    Authorized by: Tom Salas MD  Comparison: compared with previous ECG from 2/5/2024  Comparison to previous ECG: No sig change  Rhythm: sinus bradycardia  Rate: normal  Conduction: conduction normal  ST Segments: ST segments normal  T Waves: T waves normal  QRS axis: left    Clinical impression: normal ECG  Comments: Sinus bradycardia, no acute sign of ischemia            Assessment/Plan:   Diagnoses and all orders for this visit:    1. Chest pain, unspecified type (Primary)  Assessment & Plan:  Was seen in the ER for chest pain.  Had negative cardiac enzymes.  She is doing well since then.  Does have a history of diabetes.  EKG is okay.  It would be prudent to do a stress test and ongoing day 1 and have her come back in 1 month for recheck.    Orders:  -     Treadmill Stress Test; Future    2. Chronic kidney disease, stage 3b  Assessment & Plan:  Patient became dehydrated and her creatinine went up in the ER.  She has since been adequately rehydrated.  I am going to check a CMP today.Patient is instructed to not take any NSAIDs.  Medicines as directed.  Stay well-hydrated.        3. Hypertension, essential  -     Hemoglobin A1c  -     Lipid Panel  -     Comprehensive Metabolic Panel  -     Vitamin B12  -     Vitamin D,25-Hydroxy  -     TSH  Rfx On Abnormal To Free T4  -     CBC & Differential    4. B12 deficiency  -     Hemoglobin A1c  -     Lipid Panel  -     Comprehensive Metabolic Panel  -     Vitamin B12  -     Vitamin D,25-Hydroxy  -     TSH Rfx On Abnormal To Free T4  -     CBC & Differential    5. Nonintractable headache, unspecified chronicity pattern, unspecified headache type  -     Hemoglobin A1c  -     Lipid Panel  -     Comprehensive Metabolic Panel  -     Vitamin B12  -     Vitamin D,25-Hydroxy  -     TSH Rfx On Abnormal To Free T4  -     CBC & Differential    6. Anxiety  -     Hemoglobin A1c  -     Lipid Panel  -     Comprehensive Metabolic Panel  -     Vitamin B12  -     Vitamin D,25-Hydroxy  -     TSH Rfx On Abnormal To Free T4  -     CBC & Differential    7. Acquired hypothyroidism  -     Hemoglobin A1c  -     Lipid Panel  -     Comprehensive Metabolic Panel  -     Vitamin B12  -     Vitamin D,25-Hydroxy  -     TSH Rfx On Abnormal To Free T4  -     CBC & Differential    8. Uncontrolled type 2 diabetes mellitus with hyperglycemia  -     Hemoglobin A1c  -     Lipid Panel  -     Comprehensive Metabolic Panel  -     Vitamin B12  -     Vitamin D,25-Hydroxy  -     TSH Rfx On Abnormal To Free T4  -     CBC & Differential    9. Vitamin D deficiency  -     Hemoglobin A1c  -     Lipid Panel  -     Comprehensive Metabolic Panel  -     Vitamin B12  -     Vitamin D,25-Hydroxy  -     TSH Rfx On Abnormal To Free T4  -     CBC & Differential    Other orders  -     ECG 12 Lead             Follow Up:   Return in about 1 month (around 8/8/2024) for Recheck.      Tom Salas MD  [facility] – [place] [place] [facility]

## 2024-07-08 NOTE — ASSESSMENT & PLAN NOTE
Patient became dehydrated and her creatinine went up in the ER.  She has since been adequately rehydrated.  I am going to check a CMP today.Patient is instructed to not take any NSAIDs.  Medicines as directed.  Stay well-hydrated.

## 2024-07-09 LAB
25(OH)D3+25(OH)D2 SERPL-MCNC: 42.1 NG/ML (ref 30–100)
ALBUMIN SERPL-MCNC: 3.7 G/DL (ref 3.9–4.9)
ALP SERPL-CCNC: 124 IU/L (ref 44–121)
ALT SERPL-CCNC: 18 IU/L (ref 0–32)
AST SERPL-CCNC: 21 IU/L (ref 0–40)
BASOPHILS # BLD AUTO: 0.1 X10E3/UL (ref 0–0.2)
BASOPHILS NFR BLD AUTO: 1 %
BILIRUB SERPL-MCNC: 0.3 MG/DL (ref 0–1.2)
BUN SERPL-MCNC: 20 MG/DL (ref 8–27)
BUN/CREAT SERPL: 13 (ref 12–28)
CALCIUM SERPL-MCNC: 8.8 MG/DL (ref 8.7–10.3)
CHLORIDE SERPL-SCNC: 105 MMOL/L (ref 96–106)
CHOLEST SERPL-MCNC: 87 MG/DL (ref 100–199)
CO2 SERPL-SCNC: 21 MMOL/L (ref 20–29)
CREAT SERPL-MCNC: 1.56 MG/DL (ref 0.57–1)
EGFRCR SERPLBLD CKD-EPI 2021: 36 ML/MIN/1.73
EOSINOPHIL # BLD AUTO: 0.2 X10E3/UL (ref 0–0.4)
EOSINOPHIL NFR BLD AUTO: 3 %
ERYTHROCYTE [DISTWIDTH] IN BLOOD BY AUTOMATED COUNT: 12.4 % (ref 11.7–15.4)
GLOBULIN SER CALC-MCNC: 2.5 G/DL (ref 1.5–4.5)
GLUCOSE SERPL-MCNC: 218 MG/DL (ref 70–99)
HBA1C MFR BLD: 7.6 % (ref 4.8–5.6)
HCT VFR BLD AUTO: 33.1 % (ref 34–46.6)
HDLC SERPL-MCNC: 47 MG/DL
HGB BLD-MCNC: 10.6 G/DL (ref 11.1–15.9)
IMM GRANULOCYTES # BLD AUTO: 0 X10E3/UL (ref 0–0.1)
IMM GRANULOCYTES NFR BLD AUTO: 0 %
LDLC SERPL CALC-MCNC: 21 MG/DL (ref 0–99)
LYMPHOCYTES # BLD AUTO: 1.9 X10E3/UL (ref 0.7–3.1)
LYMPHOCYTES NFR BLD AUTO: 29 %
MCH RBC QN AUTO: 31.4 PG (ref 26.6–33)
MCHC RBC AUTO-ENTMCNC: 32 G/DL (ref 31.5–35.7)
MCV RBC AUTO: 98 FL (ref 79–97)
MONOCYTES # BLD AUTO: 0.5 X10E3/UL (ref 0.1–0.9)
MONOCYTES NFR BLD AUTO: 8 %
NEUTROPHILS # BLD AUTO: 3.9 X10E3/UL (ref 1.4–7)
NEUTROPHILS NFR BLD AUTO: 59 %
PLATELET # BLD AUTO: 232 X10E3/UL (ref 150–450)
POTASSIUM SERPL-SCNC: 4.9 MMOL/L (ref 3.5–5.2)
PROT SERPL-MCNC: 6.2 G/DL (ref 6–8.5)
RBC # BLD AUTO: 3.38 X10E6/UL (ref 3.77–5.28)
SODIUM SERPL-SCNC: 140 MMOL/L (ref 134–144)
TRIGL SERPL-MCNC: 99 MG/DL (ref 0–149)
TSH SERPL DL<=0.005 MIU/L-ACNC: 1.75 UIU/ML (ref 0.45–4.5)
VIT B12 SERPL-MCNC: >2000 PG/ML (ref 232–1245)
VLDLC SERPL CALC-MCNC: 19 MG/DL (ref 5–40)
WBC # BLD AUTO: 6.6 X10E3/UL (ref 3.4–10.8)

## 2024-07-17 RX ORDER — CLOPIDOGREL BISULFATE 75 MG/1
75 TABLET ORAL DAILY
Qty: 90 TABLET | Refills: 0 | Status: SHIPPED | OUTPATIENT
Start: 2024-07-17

## 2024-07-23 ENCOUNTER — HOME HEALTH ADMISSION (OUTPATIENT)
Dept: HOME HEALTH SERVICES | Facility: HOME HEALTHCARE | Age: 69
End: 2024-07-23
Payer: MEDICARE

## 2024-07-23 ENCOUNTER — TRANSCRIBE ORDERS (OUTPATIENT)
Dept: HOME HEALTH SERVICES | Facility: HOME HEALTHCARE | Age: 69
End: 2024-07-23
Payer: MEDICARE

## 2024-07-23 DIAGNOSIS — G62.9 NEUROPATHY: ICD-10-CM

## 2024-07-23 DIAGNOSIS — Z47.1 AFTERCARE FOLLOWING RIGHT KNEE JOINT REPLACEMENT SURGERY: Primary | ICD-10-CM

## 2024-07-23 DIAGNOSIS — F41.9 ANXIETY: ICD-10-CM

## 2024-07-23 DIAGNOSIS — F43.29 ADJUSTMENT DISORDER WITH OTHER SYMPTOM: ICD-10-CM

## 2024-07-23 DIAGNOSIS — Z96.651 AFTERCARE FOLLOWING RIGHT KNEE JOINT REPLACEMENT SURGERY: Primary | ICD-10-CM

## 2024-07-23 NOTE — TELEPHONE ENCOUNTER
Rx Refill Note  Requested Prescriptions     Pending Prescriptions Disp Refills    Insulin Aspart (novoLOG) 100 UNIT/ML injection 90 mL 3     Si units per day via pump          Last office visit with prescribing clinician: 3/29/2024     Next office visit with prescribing clinician: 2024         Blanche Brown MA  24, 13:48 EDT

## 2024-07-24 RX ORDER — AMLODIPINE BESYLATE 5 MG/1
5 TABLET ORAL DAILY
Qty: 90 TABLET | Refills: 0 | Status: SHIPPED | OUTPATIENT
Start: 2024-07-24

## 2024-07-24 RX ORDER — EVOLOCUMAB 140 MG/ML
140 INJECTION, SOLUTION SUBCUTANEOUS
Qty: 6 ML | Refills: 0 | Status: SHIPPED | OUTPATIENT
Start: 2024-07-24

## 2024-07-24 RX ORDER — INSULIN ASPART 100 [IU]/ML
INJECTION, SOLUTION INTRAVENOUS; SUBCUTANEOUS
Qty: 90 ML | Refills: 3 | Status: SHIPPED | OUTPATIENT
Start: 2024-07-24

## 2024-07-24 RX ORDER — CLOPIDOGREL BISULFATE 75 MG/1
75 TABLET ORAL DAILY
Qty: 90 TABLET | Refills: 0 | Status: SHIPPED | OUTPATIENT
Start: 2024-07-24

## 2024-07-24 RX ORDER — GABAPENTIN 300 MG/1
300 CAPSULE ORAL 3 TIMES DAILY
Qty: 90 CAPSULE | Refills: 0 | Status: SHIPPED | OUTPATIENT
Start: 2024-07-24

## 2024-07-24 RX ORDER — LORAZEPAM 1 MG/1
1 TABLET ORAL EVERY 8 HOURS PRN
Qty: 30 TABLET | Refills: 0 | Status: SHIPPED | OUTPATIENT
Start: 2024-07-24

## 2024-07-24 RX ORDER — BUPROPION HYDROCHLORIDE 300 MG/1
300 TABLET ORAL DAILY
Qty: 90 TABLET | Refills: 1 | Status: SHIPPED | OUTPATIENT
Start: 2024-07-24

## 2024-07-24 NOTE — TELEPHONE ENCOUNTER
Rx Refill Note    Requested Prescriptions     Pending Prescriptions Disp Refills    gabapentin (NEURONTIN) 300 MG capsule 90 capsule 0     Sig: Take 1 capsule by mouth 3 (Three) Times a Day.    amLODIPine (NORVASC) 5 MG tablet 90 tablet 0     Sig: Take 1 tablet by mouth Daily.    buPROPion XL (WELLBUTRIN XL) 300 MG 24 hr tablet 90 tablet 0     Sig: Take 1 tablet by mouth Daily.    Evolocumab (Repatha) solution prefilled syringe injection 6 mL 0     Sig: Inject 1 mL under the skin into the appropriate area as directed Every 14 (Fourteen) Days.    LORazepam (ATIVAN) 1 MG tablet 30 tablet 0     Sig: Take 1 tablet by mouth Every 8 (Eight) Hours As Needed for Anxiety.    clopidogrel (PLAVIX) 75 MG tablet 90 tablet 0     Sig: Take 1 tablet by mouth Daily.        Last office visit with prescribing clinician: 7/8/2024      Next office visit with prescribing clinician: 8/8/2024   Last labs:   Last refill: needs   Pharmacy (be sure to add in Epic). correct

## 2024-08-28 ENCOUNTER — LAB (OUTPATIENT)
Dept: FAMILY MEDICINE CLINIC | Facility: CLINIC | Age: 69
End: 2024-08-28
Payer: MEDICARE

## 2024-08-28 ENCOUNTER — TELEPHONE (OUTPATIENT)
Dept: FAMILY MEDICINE CLINIC | Facility: CLINIC | Age: 69
End: 2024-08-28
Payer: MEDICARE

## 2024-08-28 DIAGNOSIS — E78.2 HYPERLIPIDEMIA, MIXED: ICD-10-CM

## 2024-08-28 DIAGNOSIS — E03.9 ACQUIRED HYPOTHYROIDISM: ICD-10-CM

## 2024-08-28 DIAGNOSIS — Z12.31 ENCOUNTER FOR SCREENING MAMMOGRAM FOR MALIGNANT NEOPLASM OF BREAST: ICD-10-CM

## 2024-08-28 DIAGNOSIS — N18.32 CHRONIC KIDNEY DISEASE, STAGE 3B: ICD-10-CM

## 2024-08-28 DIAGNOSIS — E53.8 B12 DEFICIENCY: ICD-10-CM

## 2024-08-28 DIAGNOSIS — I65.22 LEFT CAROTID ARTERY OCCLUSION: ICD-10-CM

## 2024-08-28 DIAGNOSIS — I10 HYPERTENSION, ESSENTIAL: ICD-10-CM

## 2024-08-28 DIAGNOSIS — R92.8 ABNORMAL MAMMOGRAM: Primary | ICD-10-CM

## 2024-08-28 DIAGNOSIS — E11.65 UNCONTROLLED TYPE 2 DIABETES MELLITUS WITH HYPERGLYCEMIA: ICD-10-CM

## 2024-08-28 PROCEDURE — 36415 COLL VENOUS BLD VENIPUNCTURE: CPT | Performed by: FAMILY MEDICINE

## 2024-08-29 LAB
25(OH)D3+25(OH)D2 SERPL-MCNC: 40.2 NG/ML (ref 30–100)
ALBUMIN SERPL-MCNC: 4 G/DL (ref 3.9–4.9)
ALP SERPL-CCNC: 249 IU/L (ref 44–121)
ALT SERPL-CCNC: 32 IU/L (ref 0–32)
AST SERPL-CCNC: 20 IU/L (ref 0–40)
BASOPHILS # BLD AUTO: 0.1 X10E3/UL (ref 0–0.2)
BASOPHILS NFR BLD AUTO: 1 %
BILIRUB SERPL-MCNC: <0.2 MG/DL (ref 0–1.2)
BUN SERPL-MCNC: 21 MG/DL (ref 8–27)
BUN/CREAT SERPL: 14 (ref 12–28)
CALCIUM SERPL-MCNC: 9.1 MG/DL (ref 8.7–10.3)
CHLORIDE SERPL-SCNC: 106 MMOL/L (ref 96–106)
CO2 SERPL-SCNC: 22 MMOL/L (ref 20–29)
CREAT SERPL-MCNC: 1.5 MG/DL (ref 0.57–1)
EGFRCR SERPLBLD CKD-EPI 2021: 38 ML/MIN/1.73
EOSINOPHIL # BLD AUTO: 0.3 X10E3/UL (ref 0–0.4)
EOSINOPHIL NFR BLD AUTO: 5 %
ERYTHROCYTE [DISTWIDTH] IN BLOOD BY AUTOMATED COUNT: 12.4 % (ref 11.7–15.4)
GLOBULIN SER CALC-MCNC: 2.6 G/DL (ref 1.5–4.5)
GLUCOSE SERPL-MCNC: 117 MG/DL (ref 70–99)
HBA1C MFR BLD: 7.1 % (ref 4.8–5.6)
HCT VFR BLD AUTO: 33.5 % (ref 34–46.6)
HGB BLD-MCNC: 10.4 G/DL (ref 11.1–15.9)
IMM GRANULOCYTES # BLD AUTO: 0 X10E3/UL (ref 0–0.1)
IMM GRANULOCYTES NFR BLD AUTO: 0 %
LYMPHOCYTES # BLD AUTO: 2.9 X10E3/UL (ref 0.7–3.1)
LYMPHOCYTES NFR BLD AUTO: 43 %
MCH RBC QN AUTO: 31 PG (ref 26.6–33)
MCHC RBC AUTO-ENTMCNC: 31 G/DL (ref 31.5–35.7)
MCV RBC AUTO: 100 FL (ref 79–97)
MONOCYTES # BLD AUTO: 0.6 X10E3/UL (ref 0.1–0.9)
MONOCYTES NFR BLD AUTO: 8 %
NEUTROPHILS # BLD AUTO: 2.9 X10E3/UL (ref 1.4–7)
NEUTROPHILS NFR BLD AUTO: 43 %
PLATELET # BLD AUTO: 343 X10E3/UL (ref 150–450)
POTASSIUM SERPL-SCNC: 5 MMOL/L (ref 3.5–5.2)
PROT SERPL-MCNC: 6.6 G/DL (ref 6–8.5)
RBC # BLD AUTO: 3.35 X10E6/UL (ref 3.77–5.28)
SODIUM SERPL-SCNC: 140 MMOL/L (ref 134–144)
T4 FREE SERPL-MCNC: 1.03 NG/DL (ref 0.82–1.77)
TSH SERPL DL<=0.005 MIU/L-ACNC: 5.01 UIU/ML (ref 0.45–4.5)
VIT B12 SERPL-MCNC: 656 PG/ML (ref 232–1245)
WBC # BLD AUTO: 6.8 X10E3/UL (ref 3.4–10.8)

## 2024-08-30 LAB
CHOLEST SERPL-MCNC: 97 MG/DL (ref 100–199)
HDLC SERPL-MCNC: 43 MG/DL
LDLC SERPL CALC-MCNC: 33 MG/DL (ref 0–99)
TRIGL SERPL-MCNC: 115 MG/DL (ref 0–149)
VLDLC SERPL CALC-MCNC: 21 MG/DL (ref 5–40)

## 2024-09-03 DIAGNOSIS — F41.9 ANXIETY: ICD-10-CM

## 2024-09-03 RX ORDER — LORAZEPAM 1 MG/1
1 TABLET ORAL EVERY 8 HOURS PRN
Qty: 30 TABLET | Refills: 0 | OUTPATIENT
Start: 2024-09-03

## 2024-09-03 NOTE — TELEPHONE ENCOUNTER
Rx Refill Note    Requested Prescriptions     Pending Prescriptions Disp Refills    LORazepam (ATIVAN) 1 MG tablet 30 tablet 0     Sig: Take 1 tablet by mouth Every 8 (Eight) Hours As Needed for Anxiety.        Last office visit with prescribing clinician: 7/8/2024      Next office visit with prescribing clinician: 9/4/2024   Last labs:   Last refill: 07/24/2024   Pharmacy (be sure to add in Epic). Correct      Dr. Salas patient

## 2024-09-04 ENCOUNTER — OFFICE VISIT (OUTPATIENT)
Dept: FAMILY MEDICINE CLINIC | Facility: CLINIC | Age: 69
End: 2024-09-04
Payer: MEDICARE

## 2024-09-04 VITALS
WEIGHT: 141.5 LBS | SYSTOLIC BLOOD PRESSURE: 120 MMHG | HEIGHT: 60 IN | DIASTOLIC BLOOD PRESSURE: 80 MMHG | BODY MASS INDEX: 27.78 KG/M2 | RESPIRATION RATE: 16 BRPM

## 2024-09-04 DIAGNOSIS — G62.9 NEUROPATHY: ICD-10-CM

## 2024-09-04 DIAGNOSIS — E03.9 ACQUIRED HYPOTHYROIDISM: ICD-10-CM

## 2024-09-04 DIAGNOSIS — E53.8 B12 DEFICIENCY: ICD-10-CM

## 2024-09-04 DIAGNOSIS — E11.65 UNCONTROLLED TYPE 2 DIABETES MELLITUS WITH HYPERGLYCEMIA: ICD-10-CM

## 2024-09-04 DIAGNOSIS — R55 SYNCOPE, UNSPECIFIED SYNCOPE TYPE: ICD-10-CM

## 2024-09-04 DIAGNOSIS — E55.9 VITAMIN D DEFICIENCY: ICD-10-CM

## 2024-09-04 DIAGNOSIS — D53.9 MACROCYTIC ANEMIA: ICD-10-CM

## 2024-09-04 DIAGNOSIS — R04.0 EPISTAXIS: ICD-10-CM

## 2024-09-04 DIAGNOSIS — R07.9 CHEST PAIN, UNSPECIFIED TYPE: Primary | ICD-10-CM

## 2024-09-04 DIAGNOSIS — N18.32 CHRONIC KIDNEY DISEASE, STAGE 3B: ICD-10-CM

## 2024-09-04 DIAGNOSIS — F41.9 ANXIETY: ICD-10-CM

## 2024-09-04 DIAGNOSIS — E78.2 HYPERLIPIDEMIA, MIXED: ICD-10-CM

## 2024-09-04 DIAGNOSIS — I10 HYPERTENSION, ESSENTIAL: ICD-10-CM

## 2024-09-04 PROCEDURE — 3051F HG A1C>EQUAL 7.0%<8.0%: CPT | Performed by: FAMILY MEDICINE

## 2024-09-04 PROCEDURE — 1126F AMNT PAIN NOTED NONE PRSNT: CPT | Performed by: FAMILY MEDICINE

## 2024-09-04 PROCEDURE — 3079F DIAST BP 80-89 MM HG: CPT | Performed by: FAMILY MEDICINE

## 2024-09-04 PROCEDURE — 3074F SYST BP LT 130 MM HG: CPT | Performed by: FAMILY MEDICINE

## 2024-09-04 PROCEDURE — 36415 COLL VENOUS BLD VENIPUNCTURE: CPT | Performed by: FAMILY MEDICINE

## 2024-09-04 PROCEDURE — 99214 OFFICE O/P EST MOD 30 MIN: CPT | Performed by: FAMILY MEDICINE

## 2024-09-04 PROCEDURE — 93000 ELECTROCARDIOGRAM COMPLETE: CPT | Performed by: FAMILY MEDICINE

## 2024-09-04 RX ORDER — GABAPENTIN 300 MG/1
300 CAPSULE ORAL 3 TIMES DAILY
Qty: 90 CAPSULE | Refills: 2 | Status: SHIPPED | OUTPATIENT
Start: 2024-09-04

## 2024-09-04 RX ORDER — MIRABEGRON 25 MG/1
25 TABLET, FILM COATED, EXTENDED RELEASE ORAL DAILY
Qty: 90 TABLET | Refills: 1 | Status: SHIPPED | OUTPATIENT
Start: 2024-09-04

## 2024-09-04 RX ORDER — LORAZEPAM 1 MG/1
1 TABLET ORAL EVERY 8 HOURS PRN
Qty: 30 TABLET | Refills: 2 | Status: SHIPPED | OUTPATIENT
Start: 2024-09-04

## 2024-09-04 NOTE — PROGRESS NOTES
Patient Name: Shelley Leach  : 1955   MRN: [id number]     Chief Complaint:    Chief Complaint   Patient presents with    Follow-up       History of Present Illness: Shelley Leach is a 69 y.o. female who is here today for follow up on BG and chest pain  HPI        Review of Systems:   Review of Systems   Constitutional: Negative.    HENT: Negative.     Eyes: Negative.    Respiratory: Negative.     Cardiovascular: Negative.    Gastrointestinal: Negative.    Neurological: Negative.         Past Medical History:   Past Medical History:   Diagnosis Date    Allergic 1980    Anemia     Anxiety     Arthritis     Bilateral carotid artery stenosis     Cataract 2016    Surgery    Cholelithiasis 1976    Surgery    Clostridioides difficile infection     15 YEARS AGO, TREATED    Colon polyp     Coronary artery disease     Depression     Dietary counseling     diet education-Abstracted from [facility]    Dietary counseling and surveillance     Diverticulosis     Elevated cholesterol     GERD (gastroesophageal reflux disease)     Goiter     Heart murmur     History of thyroid disorder     Thyroid problem-Abstracted from [facility]    History of transfusion     NO REACTION    Hypertension     Hyperthyroidism     Hypoglycemia associated with type 2 diabetes mellitus 2020    Hypothyroidism     Kidney stone 1981    Leaky heart valve     Obesity 1995    Gastric bypass in 2018    Peptic ulceration 2019    After gastric bypass    PONV (postoperative nausea and vomiting)     Renal insufficiency     Type 2 diabetes mellitus, uncontrolled     Visual impairment 1968    Vitamin D deficiency        Past Surgical History:   Past Surgical History:   Procedure Laterality Date    ADENOIDECTOMY      BARIATRIC SURGERY  10/18    BLEPHAROPLASTY Bilateral     CARDIAC CATHETERIZATION      NO INTERVENTION-20 YRS AGO    CAROTID ARTERY ANGIOPLASTY N/A     CAROTID ENDARTERECTOMY Right  2021    Procedure: CAROTID ENDARTERECTOMY WITH EEG RIGHT;  Surgeon: Javed Morris MD;  Location:  [facility];  Service: Vascular;  Laterality: Right;    CARPAL TUNNEL RELEASE Bilateral     CATARACT EXTRACTION      CHOLECYSTECTOMY      COLONOSCOPY      GASTRIC BYPASS      HERNIA REPAIR  2019    HYSTERECTOMY      INTERVENTIONAL RADIOLOGY PROCEDURE Bilateral 2021    Procedure: Carotid Cerebral Angiogram;  Surgeon: Sukumar Francis MD;  Location:  [facility];  Service: Interventional Radiology;  Laterality: Bilateral;    JOINT REPLACEMENT      Both shoulders    OVARIAN CYST SURGERY      x 2    ROTATOR CUFF REPAIR Left     SUBTOTAL HYSTERECTOMY      THYROIDECTOMY, PARTIAL      TONSILLECTOMY         Family History:   Family History   Problem Relation Age of Onset    Diabetes Mother             Anemia Mother     Cancer Mother             Hypertension Mother             Anxiety disorder Mother             Arthritis Mother             Depression Mother             Hearing loss Mother             Miscarriages / Stillbirths Mother         -stillborn twins    Stroke Mother          from massive stroke 2023    Vision loss Mother             Diabetes Father             Hypertension Father             Thyroid disease Father             Kidney disease Father             Cancer Father         Decreased    Obesity Father     Arthritis Father             Hearing loss Father             Heart disease Father             Kidney disease Maternal Grandfather             Hypertension Maternal Grandmother             Obesity Paternal Grandmother     Cancer Maternal Aunt             Cancer Paternal Aunt         Breast    Diabetes Paternal Aunt     Cancer Maternal Uncle         Bladder    Diabetes Maternal Uncle     Hypertension Maternal  Uncle     Kidney disease Maternal Uncle     Obesity Maternal Uncle     Cancer Maternal Aunt         Breast-    Diabetes Maternal Aunt             Diabetes Brother             Hypertension Brother             Kidney disease Brother             Obesity Brother     Cancer Brother             Thyroid disease Brother             Diabetes Maternal Uncle             Diabetes Maternal Aunt             Early death Maternal Aunt             Asthma Brother         Decreased       Social History:   Social History     Socioeconomic History    Marital status:     Number of children: 2   Tobacco Use    Smoking status: Never    Smokeless tobacco: Never    Tobacco comments:     Father smoked until i was 8   Vaping Use    Vaping status: Never Used   Substance and Sexual Activity    Alcohol use: Never    Drug use: Never    Sexual activity: Yes     Partners: Male     Birth control/protection: Vasectomy, Hysterectomy       Medications:     Current Outpatient Medications:     gabapentin (NEURONTIN) 300 MG capsule, Take 1 capsule by mouth 3 (Three) Times a Day., Disp: 90 capsule, Rfl: 2    LORazepam (ATIVAN) 1 MG tablet, Take 1 tablet by mouth Every 8 (Eight) Hours As Needed for Anxiety., Disp: 30 tablet, Rfl: 2    Mirabegron ER (Myrbetriq) 25 MG tablet sustained-release 24 hour 24 hr tablet, Take 1 tablet by mouth Daily., Disp: 90 tablet, Rfl: 1    amLODIPine (NORVASC) 5 MG tablet, Take 1 tablet by mouth Daily., Disp: 90 tablet, Rfl: 0    aspirin 81 MG EC tablet, Take 1 tablet by mouth Daily., Disp: , Rfl:     atorvastatin (LIPITOR) 10 MG tablet, TAKE 1 TABLET EVERY NIGHT, Disp: 90 tablet, Rfl: 1    Biotin 60573 MCG tablet dispersible, Place 10,000 mcg on the tongue 2 (two) times a day., Disp: , Rfl:     Blood Glucose Monitoring Suppl (Accu-Chek Guide) w/Device kit, Use 1 kit See Admin Instructions. Use to check blood sugar 4 times daily.  Dx E11.649,  on insulin, Disp: 1 kit, Rfl: 0    buPROPion XL (WELLBUTRIN XL) 300 MG 24 hr tablet, Take 1 tablet by mouth Daily., Disp: 90 tablet, Rfl: 1    cetirizine (zyrTEC) 10 MG tablet, TAKE 1 TABLET EVERY DAY, Disp: 90 tablet, Rfl: 10    clopidogrel (PLAVIX) 75 MG tablet, Take 1 tablet by mouth Daily., Disp: 90 tablet, Rfl: 0    Continuous Blood Gluc Sensor (Dexcom G6 Sensor), Use Every 10 (Ten) Days. Every ten days, Disp: 9 each, Rfl: 3    Continuous Blood Gluc Transmit (Dexcom G6 Transmitter) misc, Use 1 each Every 3 (Three) Months., Disp: 1 each, Rfl: 3    Cyanocobalamin (B-12 Compliance Injection) 1000 MCG/ML kit, Inject  as directed., Disp: , Rfl:     estradiol (ESTRACE) 0.1 MG/GM vaginal cream, INSERT 1G VAGINALY TWICE A WEEK AT BEDTIME, Disp: , Rfl:     Evolocumab (Repatha) solution prefilled syringe injection, Inject 1 mL under the skin into the appropriate area as directed Every 14 (Fourteen) Days., Disp: 6 mL, Rfl: 0    famotidine (PEPCID) 40 MG tablet, , Disp: , Rfl:     Glucagon (Gvoke HypoPen 2-Pack) 1 MG/0.2ML solution auto-injector, Inject 1 dose under the skin into the appropriate area as directed As Needed (for hypoglycemia)., Disp: 1 mL, Rfl: 3    glucose blood (Accu-Chek Guide) test strip, Check blood sugar 3 times daily dx e11.65 on insulin pump, Disp: 300 each, Rfl: 3    Insulin Aspart (novoLOG) 100 UNIT/ML injection, 100 units per day via pump, Disp: 90 mL, Rfl: 3    Insulin Disposable Pump (Omnipod 5 G6 Pods, Gen 5,) misc, USE 1 POD EVERY 72 HOURS., Disp: 30 each, Rfl: 3    Lancets 33G misc, Use 1 each 3 (Three) Times a Day. Dx e11.65 on insulin pump, Disp: 300 each, Rfl: 3    levothyroxine (SYNTHROID, LEVOTHROID) 100 MCG tablet, TAKE 1 TABLET EVERY DAY, Disp: 90 tablet, Rfl: 0    meclizine (ANTIVERT) 25 MG tablet, Take 1 tablet by mouth 3 (Three) Times a Day As Needed for Dizziness., Disp: 60 tablet, Rfl: 1    multivitamin (THERAGRAN) tablet tablet, take 1 tablet by oral route  every day with food,  "Disp: , Rfl:     Probiotic Product (TRUBIOTICS PO), Take 1 capsule by mouth Daily., Disp: , Rfl:     sertraline (ZOLOFT) 25 MG tablet, TAKE 1 TABLET EVERY DAY, Disp: 90 tablet, Rfl: 0    Current Facility-Administered Medications:     cyanocobalamin injection 1,000 mcg, 1,000 mcg, Intramuscular, Q28 Days, Tom Salas MD, 1,000 mcg at 03/30/23 1015    cyanocobalamin injection 1,000 mcg, 1,000 mcg, Intramuscular, Q28 Days, Tom Salas MD    cyanocobalamin injection 1,000 mcg, 1,000 mcg, Intramuscular, Q28 Days, Tom Salas MD, 1,000 mcg at 12/28/23 1033    cyanocobalamin injection 1,000 mcg, 1,000 mcg, Intramuscular, Q28 Days, Tom Salas MD, 1,000 mcg at 05/28/24 0939    Facility-Administered Medications Ordered in Other Visits:     Chlorhexidine Gluconate Cloth 2 % pads 1 application, 1 application , Topical, Q12H PRN, Sarah Wan PA-C    Chlorhexidine Gluconate Cloth 2 % pads 1 application, 1 application , Topical, Q12H PRN, Jeffery Sumner PA    Allergies:   Allergies   Allergen Reactions    Percocet [Oxycodone-Acetaminophen] Itching    Morphine Other (See Comments)     HYPOTENSION    Sulfamethoxazole-Trimethoprim Swelling    Penicillins Rash         Physical Exam:  Vital Signs:   Vitals:    09/04/24 0902   BP: 120/80   Resp: 16   Weight: 64.2 kg (141 lb 8 oz)   Height: 152.4 cm (60\")     Body mass index is 27.63 kg/m².     Physical Exam  Vitals and nursing note reviewed.   Constitutional:       Appearance: Normal appearance. She is normal weight.   HENT:      Head: Normocephalic and atraumatic.      Right Ear: Tympanic membrane, ear canal and external ear normal.      Left Ear: Tympanic membrane, ear canal and external ear normal.      Nose: Nose normal.      Mouth/Throat:      Mouth: Mucous membranes are dry.      Pharynx: Oropharynx is clear.   Eyes:      Extraocular Movements: Extraocular movements intact.      Conjunctiva/sclera: Conjunctivae normal.      Pupils: Pupils are " equal, round, and reactive to light.   Cardiovascular:      Rate and Rhythm: Normal rate and regular rhythm.      Pulses: Normal pulses.      Heart sounds: Normal heart sounds.   Pulmonary:      Effort: Pulmonary effort is normal.      Breath sounds: Normal breath sounds.   Musculoskeletal:      Cervical back: Normal range of motion and neck supple.   Feet:      Comments:      Neurological:      Mental Status: She is alert.           ECG 12 Lead    Date/Time: 9/4/2024 9:21 AM  Performed by: Tom Salas MD    Authorized by: Tom Salas MD  Comparison: compared with previous ECG from 7/8/2024  Comparison to previous ECG: No sig change  Rhythm: sinus rhythm  Rate: normal  Conduction: conduction normal  ST Segments: ST segments normal  T Waves: T waves normal  QRS axis: left    Clinical impression: non-specific ECG  Comments: Borderline LAD, no sig change from prev            Assessment/Plan:   Diagnoses and all orders for this visit:    1. Chest pain, unspecified type (Primary)  Assessment & Plan:  Patient did not get stress test.  She has had her knee worked up.  I am going to get a Lexiscan.  Told her if she has recurrence of chest pain go to emergency room.  Check in 3 months    Orders:  -     ECG 12 Lead  -     Stress Test With Myocardial Perfusion - One Day; Future    2. Hyperlipidemia, mixed  Assessment & Plan:  HDL 48.  .  Recheck in 6 months.      3. Hypertension, essential  Assessment & Plan:  Discussed with patient to monitor their blood pressure and if systolic blood pressure goes above 140 or diastolic is above 90 to return to clinic.  Take medicines as directed, call for any problems, patient not having or any worrisome symptoms.          4. Acquired hypothyroidism  Assessment & Plan:  TSH is 5.010.  Free T4 normal at 1.03.  Recheck in 6 months.      5. B12 deficiency  Assessment & Plan:  B12 is 656.  We will follow-up.      6. Chronic kidney disease, stage 3b  Assessment & Plan:  GFR  is 38.Patient is instructed to not take any NSAIDs.  Medicines as directed.  Stay well-hydrated.      Orders:  -     Ferritin; Future  -     Iron Profile; Future    7. Uncontrolled type 2 diabetes mellitus with hyperglycemia  Assessment & Plan:  A1c 7.1.      8. Macrocytic anemia  Assessment & Plan:  Vitamin B12 is normal.  Will recheck in 3 months.  Patient just had surgery.      9. Vitamin D deficiency  Assessment & Plan:  Vitamin D is 40.2.  We will follow-up.      10. Anxiety  -     LORazepam (ATIVAN) 1 MG tablet; Take 1 tablet by mouth Every 8 (Eight) Hours As Needed for Anxiety.  Dispense: 30 tablet; Refill: 2    11. Neuropathy  -     gabapentin (NEURONTIN) 300 MG capsule; Take 1 capsule by mouth 3 (Three) Times a Day.  Dispense: 90 capsule; Refill: 2    12. Epistaxis  Assessment & Plan:  I am going to send her back to Dr. Pal.    Orders:  -     Ambulatory Referral to ENT (Otolaryngology)  -     Ferritin; Future  -     Iron Profile; Future    13. Syncope, unspecified syncope type  Assessment & Plan:  Going to check anemia studies.  We are going to get a stress test.  She is on a drink 5 glasses of water a day.  Return to clinic in 3 months.    Orders:  -     Stress Test With Myocardial Perfusion - One Day; Future    Other orders  -     Mirabegron ER (Myrbetriq) 25 MG tablet sustained-release 24 hour 24 hr tablet; Take 1 tablet by mouth Daily.  Dispense: 90 tablet; Refill: 1             Follow Up:   Return in about 3 months (around 12/4/2024) for Annual physical, Bloodwork 1 week prior to next appointment.      Tom Salas MD  [facility]     Answers submitted by the patient for this visit:  Other (Submitted on 8/28/2024)  Please describe your symptoms.: Dizziness  Have you had these symptoms before?: Yes  How long have you been having these symptoms?: Greater than 2 weeks  Primary Reason for Visit (Submitted on 8/28/2024)  What is the primary reason for your visit?: Other

## 2024-09-04 NOTE — ASSESSMENT & PLAN NOTE
GFR is 38.Patient is instructed to not take any NSAIDs.  Medicines as directed.  Stay well-hydrated.

## 2024-09-04 NOTE — ASSESSMENT & PLAN NOTE
Patient did not get stress test.  She has had her knee worked up.  I am going to get a Lexiscan.  Told her if she has recurrence of chest pain go to emergency room.  Check in 3 months

## 2024-09-04 NOTE — ASSESSMENT & PLAN NOTE
Going to check anemia studies.  We are going to get a stress test.  She is on a drink 5 glasses of water a day.  Return to clinic in 3 months.

## 2024-09-05 LAB
FERRITIN SERPL-MCNC: 780 NG/ML (ref 15–150)
IRON SATN MFR SERPL: 28 % (ref 15–55)
IRON SERPL-MCNC: 72 UG/DL (ref 27–139)
TIBC SERPL-MCNC: 260 UG/DL (ref 250–450)
UIBC SERPL-MCNC: 188 UG/DL (ref 118–369)

## 2024-09-11 ENCOUNTER — TELEPHONE (OUTPATIENT)
Age: 69
End: 2024-09-11
Payer: MEDICARE

## 2024-09-11 ENCOUNTER — READMISSION MANAGEMENT (OUTPATIENT)
Dept: CALL CENTER | Facility: HOSPITAL | Age: 69
End: 2024-09-11
Payer: MEDICARE

## 2024-09-11 RX ORDER — INSULIN DEGLUDEC 100 U/ML
8 INJECTION, SOLUTION SUBCUTANEOUS DAILY
Qty: 3 ML | Refills: 2 | Status: SHIPPED | OUTPATIENT
Start: 2024-09-11

## 2024-09-11 NOTE — OUTREACH NOTE
Prep Survey      Flowsheet Row Responses   Religious facility patient discharged from? Non-BH   Is LACE score < 7 ? Non-BH Discharge   Eligibility [facility]   Date of Discharge 09/11/24   Discharge Disposition Home or Self Care   Discharge diagnosis HYPOGLYCEMIA   Does the patient have one of the following disease processes/diagnoses(primary or secondary)? Other   Prep survey completed? Yes            Jennifer STARR - Registered Nurse

## 2024-09-11 NOTE — TELEPHONE ENCOUNTER
Caller: Shelley Leach    Relationship: Self    Best call back number: [phone]    Which medication are you concerned about: OMNI POD 5    Who prescribed you this medication: DR HOPKINS    When did you start taking this medication: 03/27/24    What are your concerns: PATIENT WOULD LIKE A CALL BACK REGARDING HER OMNI POD. SHE NO LONGER WANTS TO BE ON THIS. SHE STATES SHE PASSED ON MONDAY MORNING 9/9/24. BY THE TIME EMT GOT TO HER HER BS WAS 37. WHEN THEY WOULD GET IT BACK UP, IT WOULD DROP AGAIN. PLEASE CALL PATIENT.

## 2024-09-12 ENCOUNTER — TRANSITIONAL CARE MANAGEMENT TELEPHONE ENCOUNTER (OUTPATIENT)
Dept: CALL CENTER | Facility: HOSPITAL | Age: 69
End: 2024-09-12
Payer: MEDICARE

## 2024-09-12 NOTE — OUTREACH NOTE
Call Center TCM Note      Flowsheet Row Responses   [facility] [place] patient discharged from? Non-  [[facility]]   Does the patient have one of the following disease processes/diagnoses(primary or secondary)? Other   TCM attempt successful? Yes   Call start time 1437   Call end time 1440   Discharge diagnosis Hypoglycemia due to a device malfunction   Does the patient have all medications ordered at discharge? Yes   Is the patient taking all medications as directed (includes completed medication regime)? Yes   Does the patient have an appointment with their PCP within 7-14 days of discharge? No   Nursing Interventions Patient declined scheduling/rescheduling appointment at this time, Patient desires to follow up with specialty only   Has home health visited the patient within 72 hours of discharge? N/A   Psychosocial issues? No   What is the patient's perception of their health status since discharge? Improving   Is the patient/caregiver able to teach back signs and symptoms related to disease process for when to call PCP? Yes   Is the patient/caregiver able to teach back signs and symptoms related to disease process for when to call 911? Yes   Is the patient/caregiver able to teach back the hierarchy of who to call/visit for symptoms/problems? PCP, Specialist, Home health nurse, Urgent Care, ED, 911 Yes   If the patient is a current smoker, are they able to teach back resources for cessation? Not a smoker   TCM call completed? Yes   Wrap up additional comments States she is doing well, no questions, she will be following up with her endocrinologist and does not wish to see PCP at this time.   Call end time 1440   Would this patient benefit from a Referral to Amb Social Work? No   Is the patient interested in additional calls from an ambulatory ? No            Diann Lovelace RN    9/12/2024, 14:42 EDT

## 2024-09-12 NOTE — OUTREACH NOTE
Call Center TCM Note      Flowsheet Row Responses   [facility] facility patient discharged from? Non-  [[facility]]   Does the patient have one of the following disease processes/diagnoses(primary or secondary)? Other   TCM attempt successful? No   Unsuccessful attempts Attempt 1            Diann Lovelace RN    9/12/2024, 08:19 EDT

## 2024-09-16 DIAGNOSIS — E03.9 ACQUIRED HYPOTHYROIDISM: ICD-10-CM

## 2024-09-17 RX ORDER — MIRABEGRON 25 MG/1
25 TABLET, FILM COATED, EXTENDED RELEASE ORAL DAILY
Qty: 90 TABLET | Refills: 3 | OUTPATIENT
Start: 2024-09-17

## 2024-09-17 RX ORDER — SERTRALINE HYDROCHLORIDE 25 MG/1
25 TABLET, FILM COATED ORAL DAILY
Qty: 90 TABLET | Refills: 0 | Status: SHIPPED | OUTPATIENT
Start: 2024-09-17

## 2024-09-17 RX ORDER — LEVOTHYROXINE SODIUM 100 UG/1
TABLET ORAL
Qty: 90 TABLET | Refills: 0 | Status: SHIPPED | OUTPATIENT
Start: 2024-09-17

## 2024-09-28 DIAGNOSIS — E03.9 ACQUIRED HYPOTHYROIDISM: ICD-10-CM

## 2024-09-28 DIAGNOSIS — F41.9 ANXIETY: ICD-10-CM

## 2024-09-30 RX ORDER — EVOLOCUMAB 140 MG/ML
140 INJECTION, SOLUTION SUBCUTANEOUS
Qty: 6 ML | Refills: 3 | Status: SHIPPED | OUTPATIENT
Start: 2024-09-30

## 2024-09-30 RX ORDER — INSULIN ASPART 100 [IU]/ML
INJECTION, SOLUTION INTRAVENOUS; SUBCUTANEOUS
Qty: 90 ML | Refills: 3 | Status: SHIPPED | OUTPATIENT
Start: 2024-09-30

## 2024-09-30 RX ORDER — AMLODIPINE BESYLATE 5 MG/1
5 TABLET ORAL DAILY
Qty: 90 TABLET | Refills: 3 | Status: SHIPPED | OUTPATIENT
Start: 2024-09-30

## 2024-09-30 RX ORDER — LEVOTHYROXINE SODIUM 100 UG/1
TABLET ORAL
Qty: 90 TABLET | Refills: 3 | Status: SHIPPED | OUTPATIENT
Start: 2024-09-30

## 2024-09-30 RX ORDER — LORAZEPAM 1 MG/1
1 TABLET ORAL EVERY 8 HOURS PRN
Qty: 90 TABLET | Refills: 1 | Status: SHIPPED | OUTPATIENT
Start: 2024-09-30

## 2024-09-30 NOTE — TELEPHONE ENCOUNTER
Rx Refill Note  Requested Prescriptions     Pending Prescriptions Disp Refills    NovoLOG 100 UNIT/ML injection [Pharmacy Med Name: NovoLOG Injection Solution 100 UNIT/ML] 90 mL 3     Sig: INJECT 100 UNITS PER DAY VIA PUMP          Last office visit with prescribing clinician: 3/29/2024     Next office visit with prescribing clinician: 12/11/2024         Blanche Brown MA  09/30/24, 14:15 EDT

## 2024-10-01 ENCOUNTER — CLINICAL SUPPORT (OUTPATIENT)
Dept: FAMILY MEDICINE CLINIC | Facility: CLINIC | Age: 69
End: 2024-10-01
Payer: MEDICARE

## 2024-10-01 DIAGNOSIS — E53.8 B12 DEFICIENCY: Primary | ICD-10-CM

## 2024-10-01 RX ADMIN — CYANOCOBALAMIN 1000 MCG: 1000 INJECTION, SOLUTION INTRAMUSCULAR; SUBCUTANEOUS at 15:35

## 2024-10-02 NOTE — TELEPHONE ENCOUNTER
Caller: Shelley Leach    Relationship: Self    Best call back number: [phone]    What form or medical record are you requesting: SURGICAL CLEARANCE     Who is requesting this form or medical record from you: DR FRIEND AT [facility] - [phone]    How would you like to receive the form or medical records (pick-up, mail, fax): FAX   If fax, what is the fax number: [phone]    Timeframe paperwork needed: ASAP     Additional notes: PT IS NEEDING A TOTAL KNEE REPLACEMENT AND IS WAITING ON SURGICAL CLEARANCES TO BE SENT BEFORE [facility]. PLEASE SEND CLEARANCE ASAP.      Throat pack inserted at start of case per Dr. Leopold.  Pack removed at end of case per Doctor.

## 2024-10-09 ENCOUNTER — TELEPHONE (OUTPATIENT)
Dept: FAMILY MEDICINE CLINIC | Facility: CLINIC | Age: 69
End: 2024-10-09
Payer: MEDICARE

## 2024-10-09 DIAGNOSIS — R92.8 ABNORMAL MAMMOGRAM: ICD-10-CM

## 2024-10-09 NOTE — TELEPHONE ENCOUNTER
"Rx Refill Note  Requested Prescriptions     Pending Prescriptions Disp Refills    Insulin Syringe-Needle U-100 30G X 5/16\" 1 ML misc 300 each 3     Sig: Use 1 each 3 times a day.          Last office visit with prescribing clinician: 3/29/2024     Next office visit with prescribing clinician: 12/11/2024         Blanche Brown MA  10/09/24, 08:49 EDT   "

## 2024-10-09 NOTE — TELEPHONE ENCOUNTER
[place] [facility] [place] REQUEST[place] A ORDER FOR A CAMMY GUIDED BIOPSY BE PUT IN. PLEASE ADVISE AND PUT IN ORDER.

## 2024-10-10 DIAGNOSIS — R92.8 ABNORMAL MAMMOGRAM: Primary | ICD-10-CM

## 2024-10-10 NOTE — TELEPHONE ENCOUNTER
PCP: Verify Pcp   REASON FOR REQUEST:    Chief Complaint   Patient presents with   • New Patient   • Hospital F/U   • Abdominal Pain   • Nausea   • Vomiting   • Diarrhea     changing in color       HPI:  Meghan FARLEY is a 54 year old female new patient here today for abdominal pain.  Her 1st episode of epigastric abdominal pain began on Valentine's Day.  Pain brought her to her knees, states it was a similar level of pain to a kidney stone which she has had in the past.  Epigastric pain recurred on 5/10 and 5/13. She was seen in the [facility] ED 5/13/2021 for these symptoms including nausea, vomiting, diarrhea.  Labs without acute concerns.  Symptoms improved with IV fluids, Pepcid, Reglan. Prescribed Bentyl and Phenergan however she only took these a couple times since she had side effects.    Since ER visit she has been eating a bland diet, cut back on coffee, chocolate, acidic food and has been doing well.  Notes that she felt a lot better without coffee.  She did have a creamy tomato soup once and had a flare of pain.  Nausea and vomiting can accompany pain.  No hematemesis.  Denies heartburn, acid reflux, dysphagia, fever.  She has been taking Nexium twice daily for the last 2 weeks per her mother's recommendations that symptoms may be related to indigestion.  typically she prefers to take natural remedies but is miserable with this pain.     Over the last 3-6 months stools are irregular, different consistency however she is having a BM daily without straining.  No melena or hematochezia.  Takes ibuprofen about once per week for back/hip pain.  Occasionally vapes nicotine but no cigarettes. No alcohol consumption.  No past abdominal surgeries.  Mother had gallbladder removed. No past EGD/ colonoscopy.      CURRENT MEDICATIONS:  Current Outpatient Medications   Medication Sig Dispense Refill   • SUMATRIPTAN SUCCINATE PO Take 10 mg by mouth. Pt reported     • DISPENSE Herbal Thyroid medication     •  Yes  Sir general surgery   rizatriptan (MAXALT) 10 MG tablet Take 10 mg by mouth as needed for Migraine. Take 1 tablet by mouth at onset of migraine. May repeat after 2 hours if needed.       No current facility-administered medications for this visit.       SOCIAL HISTORY  Social History     Socioeconomic History   • Marital status: /Civil Union     Spouse name: Not on file   • Number of children: Not on file   • Years of education: Not on file   • Highest education level: Not on file   Occupational History   • Not on file   Tobacco Use   • Smoking status: Former Smoker     Years: 7.00   • Smokeless tobacco: Current User   Substance and Sexual Activity   • Alcohol use: Not Currently   • Drug use: Never   • Sexual activity: Not on file   Other Topics Concern   • Not on file   Social History Narrative   • Not on file     Social Determinants of Health     Financial Resource Strain:    • Social Determinants: Financial Resource Strain:    Food Insecurity:    • Social Determinants: Food Insecurity:    Transportation Needs:    • Lack of Transportation (Medical):    • Lack of Transportation (Non-Medical):    Physical Activity:    • Days of Exercise per Week:    • Minutes of Exercise per Session:    Stress:    • Social Determinants: Stress:    Social Connections:    • Social Determinants: Social Connections:    Intimate Partner Violence:    • Social Determinants: Intimate Partner Violence Past Fear:    • Social Determinants: Intimate Partner Violence Current Fear:        ALLERGIES  Allergies as of 05/28/2021   • (No Known Allergies)       PAST MEDICAL HISTORY  History reviewed. No pertinent past medical history.    PAST SURGICAL HISTORY  History reviewed. No pertinent surgical history.    FAMILY HISTORY:  History reviewed. No pertinent family history.      REVIEW OF SYSTEMS:    Constitutional:  Denies fever or chills  GI:  see HPI  Respiratory:  Denies cough or shortness of breath  Cardiovascular:  Denies chest pain     PHYSICAL EXAM:    VITAL  SIGNS:    Visit Vitals  /80 (BP Location: LUE - Left upper extremity, Patient Position: Sitting, Cuff Size: Regular)   Pulse (!) 53   Wt 53.5 kg     General: Alert and oriented x3, in no acute distress, non-toxic appearance  HEENT:  Atraumatic, external ears normal. Conjunctiva normal. No scleral icterus, pupils reactive.    Cardiovascular: Regular rate and rhythm .  Respiratory: Clear to auscultation bilaterally.    Abdomen: Soft, mild discomfort reported in epigastrium and somewhat throughout abdomen.  no distension. No rebound or guarding. Active bowel sounds. No hepatosplenomegaly or mass.  Skin:  Warm and pink with no visible rashes or jaundice  Neurologic:  grossly intact  Psychiatric:  Speech and behavior appropriate    Labs: recent labs reviewed in care everywhere      ASSESSMENT     1. Epigastric pain    2. Nausea and vomiting, intractability of vomiting not specified, unspecified vomiting type        PLAN:  - continue OTC Nexium twice daily for suspected gastritis  -anti-reflux measures and bland diet were discussed and should be followed per our handout  - EGD is recommended with risks, rationale, alternatives reviewed and consent obtained.  Rule out peptic ulcer disease, Rodgers's esophagus, H pylori  - further recommendations pending the above.  Should pain continue or worsen, Carafate can be added.  - we did discuss screening colonoscopy based on age however she declines at this time    I have advised Meghan to call my office if any questions or concerns. Questions answered and patient agrees with plan of care.    Jessy Elizabeth PA-C    Supervising physician: Dr. Young

## 2024-10-14 ENCOUNTER — TELEPHONE (OUTPATIENT)
Dept: FAMILY MEDICINE CLINIC | Facility: CLINIC | Age: 69
End: 2024-10-14
Payer: MEDICARE

## 2024-10-14 DIAGNOSIS — R92.8 ABNORMAL MAMMOGRAM OF LEFT BREAST: Primary | ICD-10-CM

## 2024-11-07 ENCOUNTER — TELEPHONE (OUTPATIENT)
Dept: CARDIOLOGY | Facility: CLINIC | Age: 69
End: 2024-11-07
Payer: MEDICARE

## 2024-11-07 NOTE — TELEPHONE ENCOUNTER
Patient is needing CC to hold her plavix for 7 days for a septal perforation repair with Dr. Hernandez.    Patient reports not current cardiac symptoms and had normal stress test 9/2024.     [phone]

## 2024-11-07 NOTE — LETTER
11/07/24      Doctor Magi Hernandez  Fax # - [phone]     Re: Shelley Leach, 1955   Procedure/Surgery - Septal perforation repair        Dear Dr. Hernandez,     Shelley Leach, 1955 is LOW RISK  for scheduled procedure/surgery from a cardiac standpoint.  Any questions please call [phone].      [x]  Hold Plavix  7  Days        Sincerely,          MD Caryl Camargo RN

## 2024-11-14 DIAGNOSIS — E78.2 HYPERLIPIDEMIA, MIXED: ICD-10-CM

## 2024-11-14 RX ORDER — ATORVASTATIN CALCIUM 10 MG/1
10 TABLET, FILM COATED ORAL NIGHTLY
Qty: 90 TABLET | Refills: 1 | Status: SHIPPED | OUTPATIENT
Start: 2024-11-14

## 2024-11-17 DIAGNOSIS — F41.9 ANXIETY: ICD-10-CM

## 2024-11-18 RX ORDER — LORAZEPAM 1 MG/1
1 TABLET ORAL EVERY 8 HOURS PRN
Qty: 30 TABLET | Refills: 2 | Status: SHIPPED | OUTPATIENT
Start: 2024-11-18

## 2024-11-21 RX ORDER — MIRABEGRON 25 MG/1
25 TABLET, FILM COATED, EXTENDED RELEASE ORAL DAILY
Qty: 90 TABLET | Refills: 0 | Status: SHIPPED | OUTPATIENT
Start: 2024-11-21

## 2024-11-21 NOTE — TELEPHONE ENCOUNTER
Caller: [facility] - [place], [place] - [address] - [phone] [facility] [phone] FX    Relationship: Pharmacy    Best call back number: [phone]    Requested Prescriptions:   Requested Prescriptions     Pending Prescriptions Disp Refills    Mirabegron ER (Myrbetriq) 25 MG tablet sustained-release 24 hour 24 hr tablet 90 tablet 1     Sig: Take 1 tablet by mouth Daily.        Pharmacy where request should be sent: [facility] - [place], [place] - [address] - [phone] [facility] [phone] FX     Last office visit with prescribing clinician: 9/4/2024   Last telemedicine visit with prescribing clinician: Visit date not found   Next office visit with prescribing clinician: 12/4/2024     Additional details provided by patient:     Does the patient have less than a 3 day supply:  [] Yes  [x] No    Would you like a call back once the refill request has been completed: [] Yes [x] No    If the office needs to give you a call back, can they leave a voicemail: [] Yes [x] No    Bryce Real Rep   11/21/24 10:00 EST

## 2024-11-22 ENCOUNTER — TELEPHONE (OUTPATIENT)
Dept: FAMILY MEDICINE CLINIC | Facility: CLINIC | Age: 69
End: 2024-11-22
Payer: MEDICARE

## 2024-11-22 ENCOUNTER — LAB (OUTPATIENT)
Dept: FAMILY MEDICINE CLINIC | Facility: CLINIC | Age: 69
End: 2024-11-22
Payer: MEDICARE

## 2024-11-22 DIAGNOSIS — E55.9 VITAMIN D DEFICIENCY: ICD-10-CM

## 2024-11-22 DIAGNOSIS — E53.8 B12 DEFICIENCY: ICD-10-CM

## 2024-11-22 DIAGNOSIS — E11.65 UNCONTROLLED TYPE 2 DIABETES MELLITUS WITH HYPERGLYCEMIA: ICD-10-CM

## 2024-11-22 DIAGNOSIS — E78.2 HYPERLIPIDEMIA, MIXED: Primary | ICD-10-CM

## 2024-11-22 DIAGNOSIS — N18.32 CHRONIC KIDNEY DISEASE, STAGE 3B: ICD-10-CM

## 2024-11-22 DIAGNOSIS — E78.2 HYPERLIPIDEMIA, MIXED: ICD-10-CM

## 2024-11-23 LAB
25(OH)D3+25(OH)D2 SERPL-MCNC: 36.9 NG/ML (ref 30–100)
ALBUMIN SERPL-MCNC: 3.9 G/DL (ref 3.9–4.9)
ALP SERPL-CCNC: 145 IU/L (ref 44–121)
ALT SERPL-CCNC: 17 IU/L (ref 0–32)
AST SERPL-CCNC: 24 IU/L (ref 0–40)
BASOPHILS # BLD AUTO: 0.1 X10E3/UL (ref 0–0.2)
BASOPHILS NFR BLD AUTO: 1 %
BILIRUB SERPL-MCNC: 0.3 MG/DL (ref 0–1.2)
BUN SERPL-MCNC: 22 MG/DL (ref 8–27)
BUN/CREAT SERPL: 15 (ref 12–28)
CALCIUM SERPL-MCNC: 9 MG/DL (ref 8.7–10.3)
CHLORIDE SERPL-SCNC: 106 MMOL/L (ref 96–106)
CHOLEST SERPL-MCNC: 84 MG/DL (ref 100–199)
CO2 SERPL-SCNC: 23 MMOL/L (ref 20–29)
CREAT SERPL-MCNC: 1.48 MG/DL (ref 0.57–1)
EGFRCR SERPLBLD CKD-EPI 2021: 39 ML/MIN/1.73
EOSINOPHIL # BLD AUTO: 0.3 X10E3/UL (ref 0–0.4)
EOSINOPHIL NFR BLD AUTO: 4 %
ERYTHROCYTE [DISTWIDTH] IN BLOOD BY AUTOMATED COUNT: 12.6 % (ref 11.7–15.4)
GLOBULIN SER CALC-MCNC: 2.5 G/DL (ref 1.5–4.5)
GLUCOSE SERPL-MCNC: 108 MG/DL (ref 70–99)
HBA1C MFR BLD: 7.7 % (ref 4.8–5.6)
HCT VFR BLD AUTO: 36.1 % (ref 34–46.6)
HDLC SERPL-MCNC: 48 MG/DL
HGB BLD-MCNC: 11.7 G/DL (ref 11.1–15.9)
IMM GRANULOCYTES # BLD AUTO: 0 X10E3/UL (ref 0–0.1)
IMM GRANULOCYTES NFR BLD AUTO: 0 %
LDLC SERPL CALC-MCNC: 14 MG/DL (ref 0–99)
LYMPHOCYTES # BLD AUTO: 3.1 X10E3/UL (ref 0.7–3.1)
LYMPHOCYTES NFR BLD AUTO: 46 %
MCH RBC QN AUTO: 31 PG (ref 26.6–33)
MCHC RBC AUTO-ENTMCNC: 32.4 G/DL (ref 31.5–35.7)
MCV RBC AUTO: 96 FL (ref 79–97)
MONOCYTES # BLD AUTO: 0.5 X10E3/UL (ref 0.1–0.9)
MONOCYTES NFR BLD AUTO: 7 %
NEUTROPHILS # BLD AUTO: 2.9 X10E3/UL (ref 1.4–7)
NEUTROPHILS NFR BLD AUTO: 42 %
PLATELET # BLD AUTO: 245 X10E3/UL (ref 150–450)
POTASSIUM SERPL-SCNC: 4.3 MMOL/L (ref 3.5–5.2)
PROT SERPL-MCNC: 6.4 G/DL (ref 6–8.5)
RBC # BLD AUTO: 3.78 X10E6/UL (ref 3.77–5.28)
SODIUM SERPL-SCNC: 142 MMOL/L (ref 134–144)
T4 FREE SERPL-MCNC: 0.91 NG/DL (ref 0.82–1.77)
TRIGL SERPL-MCNC: 121 MG/DL (ref 0–149)
TSH SERPL DL<=0.005 MIU/L-ACNC: 10.6 UIU/ML (ref 0.45–4.5)
VIT B12 SERPL-MCNC: 501 PG/ML (ref 232–1245)
VLDLC SERPL CALC-MCNC: 22 MG/DL (ref 5–40)
WBC # BLD AUTO: 6.8 X10E3/UL (ref 3.4–10.8)

## 2024-12-04 ENCOUNTER — OFFICE VISIT (OUTPATIENT)
Dept: FAMILY MEDICINE CLINIC | Facility: CLINIC | Age: 69
End: 2024-12-04
Payer: MEDICARE

## 2024-12-04 VITALS
WEIGHT: 146.7 LBS | SYSTOLIC BLOOD PRESSURE: 120 MMHG | HEIGHT: 60 IN | OXYGEN SATURATION: 100 % | BODY MASS INDEX: 28.8 KG/M2 | DIASTOLIC BLOOD PRESSURE: 80 MMHG | HEART RATE: 68 BPM | RESPIRATION RATE: 16 BRPM

## 2024-12-04 DIAGNOSIS — N18.32 CHRONIC KIDNEY DISEASE, STAGE 3B: ICD-10-CM

## 2024-12-04 DIAGNOSIS — G62.9 NEUROPATHY: ICD-10-CM

## 2024-12-04 DIAGNOSIS — E03.9 ACQUIRED HYPOTHYROIDISM: ICD-10-CM

## 2024-12-04 DIAGNOSIS — E55.9 VITAMIN D DEFICIENCY: ICD-10-CM

## 2024-12-04 DIAGNOSIS — F43.29 ADJUSTMENT DISORDER WITH OTHER SYMPTOM: ICD-10-CM

## 2024-12-04 DIAGNOSIS — Z78.0 POST-MENOPAUSAL: ICD-10-CM

## 2024-12-04 DIAGNOSIS — R07.9 CHEST PAIN, UNSPECIFIED TYPE: ICD-10-CM

## 2024-12-04 DIAGNOSIS — E78.2 HYPERLIPIDEMIA, MIXED: ICD-10-CM

## 2024-12-04 DIAGNOSIS — E53.8 B12 DEFICIENCY: ICD-10-CM

## 2024-12-04 DIAGNOSIS — I10 HYPERTENSION, ESSENTIAL: Primary | ICD-10-CM

## 2024-12-04 PROCEDURE — 99214 OFFICE O/P EST MOD 30 MIN: CPT | Performed by: FAMILY MEDICINE

## 2024-12-04 PROCEDURE — 3079F DIAST BP 80-89 MM HG: CPT | Performed by: FAMILY MEDICINE

## 2024-12-04 PROCEDURE — 3051F HG A1C>EQUAL 7.0%<8.0%: CPT | Performed by: FAMILY MEDICINE

## 2024-12-04 PROCEDURE — 1126F AMNT PAIN NOTED NONE PRSNT: CPT | Performed by: FAMILY MEDICINE

## 2024-12-04 PROCEDURE — 3074F SYST BP LT 130 MM HG: CPT | Performed by: FAMILY MEDICINE

## 2024-12-04 RX ORDER — GABAPENTIN 300 MG/1
300 CAPSULE ORAL 3 TIMES DAILY
Qty: 90 CAPSULE | Refills: 2 | Status: SHIPPED | OUTPATIENT
Start: 2024-12-04

## 2024-12-04 NOTE — ASSESSMENT & PLAN NOTE
GFR is 39.  Patient is instructed to not take any NSAIDs.  Medicines as directed.  Stay well-hydrated.

## 2024-12-04 NOTE — PROGRESS NOTES
Patient Name: Shelley Leach  : 1955   MRN: [id number]     Chief Complaint:    Chief Complaint   Patient presents with    Primary Care Follow-Up       History of Present Illness: Shelley Leach is a 69 y.o. female who is here today for follow up on thyroid and blood sugar.  HPI        Review of Systems:   Review of Systems   Constitutional: Negative.    HENT: Negative.     Eyes: Negative.    Respiratory: Negative.     Cardiovascular: Negative.    Gastrointestinal: Negative.    Neurological: Negative.         Past Medical History:   Past Medical History:   Diagnosis Date    Allergic 1980    Anemia 1976    Anxiety     Arthritis     Bilateral carotid artery stenosis     Cataract 2016    Surgery    Cholelithiasis 1976    Surgery    Clostridioides difficile infection     15 YEARS AGO, TREATED    Colon polyp     Coronary artery disease     Depression     Dietary counseling     diet education-Abstracted from [place]    Dietary counseling and surveillance     Diverticulosis     Elevated cholesterol     GERD (gastroesophageal reflux disease)     Goiter     Heart murmur     History of thyroid disorder     Thyroid problem-Abstracted from [place]    History of transfusion     NO REACTION    Hypertension     Hyperthyroidism     Hypoglycemia associated with type 2 diabetes mellitus 2020    Hypothyroidism     Kidney stone 1981    Leaky heart valve     Obesity 1995    Gastric bypass in 2018    Peptic ulceration 2019    After gastric bypass    PONV (postoperative nausea and vomiting)     Renal insufficiency     Type 2 diabetes mellitus, uncontrolled     Visual impairment 1968    Vitamin D deficiency        Past Surgical History:   Past Surgical History:   Procedure Laterality Date    ADENOIDECTOMY      BARIATRIC SURGERY  10/18    BLEPHAROPLASTY Bilateral     CARDIAC CATHETERIZATION      NO INTERVENTION-20 YRS AGO    CAROTID ARTERY ANGIOPLASTY N/A     CAROTID  ENDARTERECTOMY Right 2021    Procedure: CAROTID ENDARTERECTOMY WITH EEG RIGHT;  Surgeon: Javed Morris MD;  Location:  [facility];  Service: Vascular;  Laterality: Right;    CARPAL TUNNEL RELEASE Bilateral     CATARACT EXTRACTION      CHOLECYSTECTOMY      COLONOSCOPY      GASTRIC BYPASS      HERNIA REPAIR  2019    HYSTERECTOMY      INTERVENTIONAL RADIOLOGY PROCEDURE Bilateral 2021    Procedure: Carotid Cerebral Angiogram;  Surgeon: Sukumar Francis MD;  Location:  [facility];  Service: Interventional Radiology;  Laterality: Bilateral;    JOINT REPLACEMENT      Both shoulders    OVARIAN CYST SURGERY      x 2    ROTATOR CUFF REPAIR Left     SUBTOTAL HYSTERECTOMY      THYROIDECTOMY, PARTIAL      TONSILLECTOMY         Family History:   Family History   Problem Relation Age of Onset    Diabetes Mother             Anemia Mother     Cancer Mother             Hypertension Mother             Anxiety disorder Mother             Arthritis Mother             Depression Mother             Hearing loss Mother             Miscarriages / Stillbirths Mother         -stillborn twins    Stroke Mother          from massive stroke 2023    Vision loss Mother             Diabetes Father             Hypertension Father             Thyroid disease Father             Kidney disease Father             Cancer Father         Decreased    Obesity Father     Arthritis Father             Hearing loss Father             Heart disease Father             Kidney disease Maternal Grandfather             Hypertension Maternal Grandmother             Obesity Paternal Grandmother     Cancer Maternal Aunt             Cancer Paternal Aunt         Breast    Diabetes Paternal Aunt     Cancer Maternal Uncle         Bladder    Diabetes Maternal Uncle      Hypertension Maternal Uncle     Kidney disease Maternal Uncle     Obesity Maternal Uncle     Cancer Maternal Aunt         Breast-    Diabetes Maternal Aunt             Diabetes Brother             Hypertension Brother             Kidney disease Brother             Obesity Brother     Cancer Brother             Thyroid disease Brother             Diabetes Maternal Uncle             Diabetes Maternal Aunt             Early death Maternal Aunt             Asthma Brother         Decreased       Social History:   Social History     Socioeconomic History    Marital status:     Number of children: 2   Tobacco Use    Smoking status: Never    Smokeless tobacco: Never    Tobacco comments:     Father smoked until i was 8   Vaping Use    Vaping status: Never Used   Substance and Sexual Activity    Alcohol use: Never    Drug use: Never    Sexual activity: Yes     Partners: Male     Birth control/protection: Vasectomy, Hysterectomy       Medications:     Current Outpatient Medications:     amLODIPine (NORVASC) 5 MG tablet, TAKE 1 TABLET EVERY DAY, Disp: 90 tablet, Rfl: 3    aspirin 81 MG EC tablet, Take 1 tablet by mouth Daily., Disp: , Rfl:     atorvastatin (LIPITOR) 10 MG tablet, TAKE 1 TABLET EVERY NIGHT, Disp: 90 tablet, Rfl: 1    Biotin 02530 MCG tablet dispersible, Place 10,000 mcg on the tongue 2 (two) times a day., Disp: , Rfl:     Blood Glucose Monitoring Suppl (Accu-Chek Guide) w/Device kit, Use 1 kit See Admin Instructions. Use to check blood sugar 4 times daily.  Dx E11.649, on insulin, Disp: 1 kit, Rfl: 0    buPROPion XL (WELLBUTRIN XL) 300 MG 24 hr tablet, Take 1 tablet by mouth Daily., Disp: 90 tablet, Rfl: 1    cetirizine (zyrTEC) 10 MG tablet, TAKE 1 TABLET EVERY DAY, Disp: 90 tablet, Rfl: 10    clopidogrel (PLAVIX) 75 MG tablet, Take 1 tablet by mouth Daily., Disp: 90 tablet, Rfl: 0    Continuous Blood Gluc Sensor (Dexcom G6  "Sensor), Use Every 10 (Ten) Days. Every ten days, Disp: 9 each, Rfl: 3    Continuous Blood Gluc Transmit (Dexcom G6 Transmitter) misc, Use 1 each Every 3 (Three) Months., Disp: 1 each, Rfl: 3    Cyanocobalamin (B-12 Compliance Injection) 1000 MCG/ML kit, Inject  as directed., Disp: , Rfl:     estradiol (ESTRACE) 0.1 MG/GM vaginal cream, INSERT 1G VAGINALY TWICE A WEEK AT BEDTIME, Disp: , Rfl:     famotidine (PEPCID) 40 MG tablet, , Disp: , Rfl:     gabapentin (NEURONTIN) 300 MG capsule, Take 1 capsule by mouth 3 (Three) Times a Day., Disp: 90 capsule, Rfl: 2    Insulin Syringe-Needle U-100 30G X 5/16\" 1 ML misc, Use 1 each 3 times a day. Dx code e11.65, Disp: 300 each, Rfl: 3    Lancets 33G misc, Use 1 each 3 (Three) Times a Day. Dx e11.65 on insulin pump, Disp: 300 each, Rfl: 3    levothyroxine (SYNTHROID, LEVOTHROID) 100 MCG tablet, TAKE 1 TABLET EVERY DAY, Disp: 90 tablet, Rfl: 3    LORazepam (ATIVAN) 1 MG tablet, TAKE ONE TABLET BY MOUTH EVERY 8 HOURS AS NEEDED FOR ANXIETY, Disp: 30 tablet, Rfl: 2    meclizine (ANTIVERT) 25 MG tablet, Take 1 tablet by mouth 3 (Three) Times a Day As Needed for Dizziness., Disp: 60 tablet, Rfl: 1    Mirabegron ER (Myrbetriq) 25 MG tablet sustained-release 24 hour 24 hr tablet, Take 1 tablet by mouth Daily., Disp: 90 tablet, Rfl: 0    multivitamin (THERAGRAN) tablet tablet, take 1 tablet by oral route  every day with food, Disp: , Rfl:     NovoLOG 100 UNIT/ML injection, INJECT 100 UNITS PER DAY VIA PUMP, Disp: 90 mL, Rfl: 3    Probiotic Product (TRUBIOTICS PO), Take 1 capsule by mouth Daily., Disp: , Rfl:     Repatha solution prefilled syringe injection, INJECT 1 ML UNDER THE SKIN INTO THE APPROPRIATE AREA AS DIRECTED EVERY 14 (FOURTEEN) DAYS., Disp: 6 mL, Rfl: 3    Glucagon (Gvoke HypoPen 2-Pack) 1 MG/0.2ML solution auto-injector, Inject 1 dose under the skin into the appropriate area as directed As Needed (for hypoglycemia)., Disp: 1 mL, Rfl: 3    glucose blood (Accu-Chek Guide) " "test strip, Check blood sugar 3 times daily dx e11.65 on insulin pump, Disp: 300 each, Rfl: 3    Current Facility-Administered Medications:     cyanocobalamin injection 1,000 mcg, 1,000 mcg, Intramuscular, Q28 Days, Tom Salas MD, 1,000 mcg at 03/30/23 1015    cyanocobalamin injection 1,000 mcg, 1,000 mcg, Intramuscular, Q28 Days, Tom Salas MD, 1,000 mcg at 10/01/24 1535    cyanocobalamin injection 1,000 mcg, 1,000 mcg, Intramuscular, Q28 Days, Tom Salas MD, 1,000 mcg at 12/28/23 1033    cyanocobalamin injection 1,000 mcg, 1,000 mcg, Intramuscular, Q28 Days, Tom Salas MD, 1,000 mcg at 05/28/24 0939    Facility-Administered Medications Ordered in Other Visits:     Chlorhexidine Gluconate Cloth 2 % pads 1 application, 1 application , Topical, Q12H PRN, Sarah Wan PA-C    Chlorhexidine Gluconate Cloth 2 % pads 1 application, 1 application , Topical, Q12H PRN, Jeffery Sumner PA    Allergies:   Allergies   Allergen Reactions    Trimethoprim Swelling    Percocet [Oxycodone-Acetaminophen] Itching    Morphine Other (See Comments)     HYPOTENSION    Sulfamethoxazole-Trimethoprim Swelling    Penicillins Rash         Physical Exam:  Vital Signs:   Vitals:    12/04/24 1023   BP: 120/80   BP Location: Right arm   Patient Position: Sitting   Cuff Size: Adult   Pulse: 68   Resp: 16   SpO2: 100%   Weight: 66.5 kg (146 lb 11.2 oz)   Height: 152.4 cm (60\")     Body mass index is 28.65 kg/m².     Physical Exam  Vitals and nursing note reviewed.   Constitutional:       Appearance: Normal appearance. She is normal weight.   HENT:      Head: Normocephalic and atraumatic.      Right Ear: Tympanic membrane, ear canal and external ear normal.      Left Ear: Tympanic membrane, ear canal and external ear normal.      Nose: Nose normal.      Mouth/Throat:      Mouth: Mucous membranes are dry.      Pharynx: Oropharynx is clear.   Eyes:      Extraocular Movements: Extraocular movements intact.      " Conjunctiva/sclera: Conjunctivae normal.      Pupils: Pupils are equal, round, and reactive to light.   Cardiovascular:      Rate and Rhythm: Normal rate and regular rhythm.      Pulses: Normal pulses.      Heart sounds: Normal heart sounds.   Pulmonary:      Effort: Pulmonary effort is normal.      Breath sounds: Normal breath sounds.   Musculoskeletal:      Cervical back: Normal range of motion and neck supple.   Feet:      Comments: Patient had normal pulses in dorsalis pedis and posterior tibial bilaterally.  Patient had no abnormal callus or ulcer formation bilaterally.  Patient had good sharp and dull discrimination throughout all areas of the foot.  Sensation was intact.  Patient had normal diabetic foot exam.  Discussed proper foot wear and counseling of feet hygiene.       Neurological:      Mental Status: She is alert.         Procedures      Assessment/Plan:   Diagnoses and all orders for this visit:    1. Hypertension, essential (Primary)  Assessment & Plan:  Discussed with patient to monitor their blood pressure and if systolic blood pressure goes above 140 or diastolic is above 90 to return to clinic.  Take medicines as directed, call for any problems, patient not having or any worrisome symptoms.          2. Hyperlipidemia, mixed  Assessment & Plan:  HDL 46.  LDL 14.  Triglycerides 121.  Check in 6 months      3. Acquired hypothyroidism  Assessment & Plan:  TSH is 1.5.  Free T4 still normal at 0.91.  Recheck in 3 months.  Follows with endocrinology.      4. B12 deficiency  Assessment & Plan:  B12 is 501.  We will follow-up.      5. Vitamin D deficiency  Assessment & Plan:  Vitamin D is 36.9.  We will continue replacement and recheck in 3 months.    Orders:  -     DEXA Bone Density Axial    6. Adjustment disorder with other symptom  Assessment & Plan:  Patient's daughter is really struggling.  We are going to refill her Ativan today.  House bill 1      7. Chronic kidney disease, stage 3b  Assessment &  Plan:  GFR is 39.  Patient is instructed to not take any NSAIDs.  Medicines as directed.  Stay well-hydrated.        8. Chest pain, unspecified type  Assessment & Plan:  Angela scan is negative.      9. Neuropathy  -     gabapentin (NEURONTIN) 300 MG capsule; Take 1 capsule by mouth 3 (Three) Times a Day.  Dispense: 90 capsule; Refill: 2    10. Post-menopausal  -     DEXA Bone Density Axial             Follow Up:   Return in about 3 months (around 3/4/2025) for Recheck.      Tom Salas MD  [facility]

## 2024-12-05 RX ORDER — SERTRALINE HYDROCHLORIDE 25 MG/1
25 TABLET, FILM COATED ORAL DAILY
Qty: 90 TABLET | Refills: 0 | Status: SHIPPED | OUTPATIENT
Start: 2024-12-05

## 2024-12-05 NOTE — TELEPHONE ENCOUNTER
Rx Refill Note    Requested Prescriptions     Pending Prescriptions Disp Refills    sertraline (ZOLOFT) 25 MG tablet [Pharmacy Med Name: Sertraline HCl Oral Tablet 25 MG] 90 tablet 0     Sig: TAKE 1 TABLET EVERY DAY        Last office visit with prescribing clinician: 9/4/2024      Next office visit with prescribing clinician: 12/4/2024   Last labs:   Last refill: not on med list now, says that it was d/c?   Pharmacy (be sure to add in Epic). correct

## 2024-12-09 RX ORDER — CLOPIDOGREL BISULFATE 75 MG/1
75 TABLET ORAL DAILY
Qty: 90 TABLET | Refills: 3 | Status: SHIPPED | OUTPATIENT
Start: 2024-12-09

## 2024-12-11 ENCOUNTER — OFFICE VISIT (OUTPATIENT)
Age: 69
End: 2024-12-11
Payer: MEDICARE

## 2024-12-11 VITALS
DIASTOLIC BLOOD PRESSURE: 78 MMHG | HEIGHT: 60 IN | WEIGHT: 142.9 LBS | HEART RATE: 75 BPM | SYSTOLIC BLOOD PRESSURE: 138 MMHG | OXYGEN SATURATION: 97 % | BODY MASS INDEX: 28.06 KG/M2

## 2024-12-11 DIAGNOSIS — E03.9 ACQUIRED HYPOTHYROIDISM: ICD-10-CM

## 2024-12-11 DIAGNOSIS — E11.649 HYPOGLYCEMIA ASSOCIATED WITH TYPE 2 DIABETES MELLITUS: Primary | ICD-10-CM

## 2024-12-11 PROCEDURE — 3051F HG A1C>EQUAL 7.0%<8.0%: CPT | Performed by: INTERNAL MEDICINE

## 2024-12-11 PROCEDURE — 99214 OFFICE O/P EST MOD 30 MIN: CPT | Performed by: INTERNAL MEDICINE

## 2024-12-11 PROCEDURE — 3078F DIAST BP <80 MM HG: CPT | Performed by: INTERNAL MEDICINE

## 2024-12-11 PROCEDURE — 3075F SYST BP GE 130 - 139MM HG: CPT | Performed by: INTERNAL MEDICINE

## 2024-12-11 RX ORDER — PEN NEEDLE, DIABETIC 32GX 5/32"
1 NEEDLE, DISPOSABLE MISCELLANEOUS DAILY
Qty: 100 EACH | Refills: 3 | Status: SHIPPED | OUTPATIENT
Start: 2024-12-11

## 2024-12-11 RX ORDER — LEVOTHYROXINE SODIUM 112 UG/1
112 TABLET ORAL DAILY
Qty: 90 TABLET | Refills: 3 | Status: SHIPPED | OUTPATIENT
Start: 2024-12-11 | End: 2025-12-11

## 2024-12-11 NOTE — ASSESSMENT & PLAN NOTE
A1c is  acceptable but even with just 2 unts at means she had frequent post prandial hypoglycemia. We will stop the prandial insulin. She understand to resume it if her numbers go too high after meals

## 2024-12-11 NOTE — PROGRESS NOTES
"     Office Note      Date: 2024  Patient Name: Shelley Leach  MRN: [id number]  : 1955    Chief Complaint   Patient presents with    Uncontrolled type 2 diabetes mellitus with hyperglycemia       History of Present Illness:   Shelley Leach is a 69 y.o. female who presents for Diabetes type 2.   Current RX insulin with dexcom.  She no longer uses the  omnipod because when she started using her phone to control pump she set it up wrong and gave herself too much insulin     Bg checks are done: with dexcom. She finds that just 2 units of rapid insulin at each meal causes hypoglycemia   Hypoglycemia : rare       Last A1c:  Hemoglobin A1C   Date Value Ref Range Status   2024 7.7 (H) 4.8 - 5.6 % Final     Comment:              Prediabetes: 5.7 - 6.4           Diabetes: >6.4           Glycemic control for adults with diabetes: <7.0     2024 7.4 (A) 4.5 - 5.7 % Final   2021 10.40 (H) 4.80 - 5.60 % Final       Changes in health since last visit:  tsh was 10 in November . Last eye exam last week .    Subjective         Review of Systems:   Review of Systems   Constitutional:  Negative for unexpected weight change.   Endocrine: Positive for cold intolerance.       The following portions of the patient's history were reviewed and updated as appropriate: allergies, current medications, past family history, past medical history, past social history, past surgical history, and problem list.    Objective     Visit Vitals  /78 (BP Location: Left arm, Patient Position: Sitting)   Pulse 75   Ht 152.4 cm (60\")   Wt 64.8 kg (142 lb 14.4 oz)   SpO2 97%   BMI 27.91 kg/m²           Physical Exam:  Physical Exam  Vitals reviewed.   Constitutional:       Appearance: Normal appearance.   Neurological:      Mental Status: She is alert.   Psychiatric:         Mood and Affect: Mood normal.         Behavior: Behavior normal.         Thought Content: Thought content normal.         Judgment: " Judgment normal.          Assessment / Plan      Assessment & Plan:  Problem List Items Addressed This Visit       Acquired hypothyroidism    Current Assessment & Plan     Tsh 10 on 100 mcg per day.. worse  Plan : increase dose to 112 mcg per day and then  recheck next time         Relevant Medications    levothyroxine (Synthroid) 112 MCG tablet    Hypoglycemia associated with type 2 diabetes mellitus - Primary    Current Assessment & Plan     A1c is  acceptable but even with just 2 unts at means she had frequent post prandial hypoglycemia. We will stop the prandial insulin. She understand to resume it if her numbers go too high after meals          Relevant Medications    Glucagon (Gvoke HypoPen 2-Pack) 1 MG/0.2ML solution auto-injector    NovoLOG 100 UNIT/ML injection    Insulin Glargine (LANTUS SOLOSTAR) 100 UNIT/ML injection pen         Electronically signed by : Sonu Talbot MD  12/11/2024   English

## 2024-12-11 NOTE — ASSESSMENT & PLAN NOTE
Tsh 10 on 100 mcg per day.. worse  Plan : increase dose to 112 mcg per day and then  recheck next time

## 2024-12-17 ENCOUNTER — TELEPHONE (OUTPATIENT)
Dept: CARDIOLOGY | Facility: CLINIC | Age: 69
End: 2024-12-17
Payer: MEDICARE

## 2025-01-03 RX ORDER — PROCHLORPERAZINE 25 MG/1
SUPPOSITORY RECTAL
Qty: 9 EACH | Refills: 3 | Status: SHIPPED | OUTPATIENT
Start: 2025-01-03

## 2025-01-03 NOTE — TELEPHONE ENCOUNTER
Rx Refill Note  Requested Prescriptions     Pending Prescriptions Disp Refills    Continuous Glucose Sensor (Dexcom G6 Sensor) [Pharmacy Med Name: Dexcom G6 Sensor Miscellaneous] 9 each 3     Sig: USE AS DIRECTED AND CHANGE SENSOR EVERY 10 DAYS      Last office visit with prescribing clinician: 12/11/2024      Next office visit with prescribing clinician: 6/25/2025       Padmini Escalante MA  01/03/25, 10:20 EST

## 2025-02-09 DIAGNOSIS — G62.9 NEUROPATHY: ICD-10-CM

## 2025-02-10 RX ORDER — CETIRIZINE HYDROCHLORIDE 10 MG/1
10 TABLET ORAL DAILY
Qty: 90 TABLET | Refills: 10 | Status: SHIPPED | OUTPATIENT
Start: 2025-02-10

## 2025-02-10 RX ORDER — MIRABEGRON 25 MG/1
25 TABLET, FILM COATED, EXTENDED RELEASE ORAL DAILY
Qty: 90 TABLET | Refills: 0 | Status: SHIPPED | OUTPATIENT
Start: 2025-02-10

## 2025-02-10 RX ORDER — GABAPENTIN 300 MG/1
300 CAPSULE ORAL 3 TIMES DAILY
Qty: 90 CAPSULE | Refills: 2 | Status: SHIPPED | OUTPATIENT
Start: 2025-02-10

## 2025-02-10 RX ORDER — LEVOTHYROXINE SODIUM 112 UG/1
112 TABLET ORAL DAILY
Qty: 90 TABLET | Refills: 0 | Status: SHIPPED | OUTPATIENT
Start: 2025-02-10 | End: 2026-02-10

## 2025-02-10 NOTE — TELEPHONE ENCOUNTER
Rx Refill Note  Requested Prescriptions     Pending Prescriptions Disp Refills    levothyroxine (Synthroid) 112 MCG tablet 90 tablet 0     Sig: Take 1 tablet by mouth Daily.      Last office visit with prescribing clinician: 12/11/2024      Next office visit with prescribing clinician: 6/25/2025       Padmini Escalante MA  02/10/25, 09:25 EST    Oriented - self; Oriented - place; Oriented - time

## 2025-02-12 ENCOUNTER — HOSPITAL ENCOUNTER (OUTPATIENT)
Dept: BONE DENSITY | Facility: HOSPITAL | Age: 70
Discharge: HOME | End: 2025-02-12
Admitting: FAMILY MEDICINE
Payer: MEDICARE

## 2025-02-12 PROCEDURE — 77080 DXA BONE DENSITY AXIAL: CPT

## 2025-02-18 ENCOUNTER — TELEPHONE (OUTPATIENT)
Dept: FAMILY MEDICINE CLINIC | Facility: CLINIC | Age: 70
End: 2025-02-18
Payer: MEDICARE

## 2025-02-18 RX ORDER — ALENDRONATE SODIUM 70 MG/1
70 TABLET ORAL
Qty: 12 TABLET | Refills: 1 | Status: SHIPPED | OUTPATIENT
Start: 2025-02-18

## 2025-02-21 DIAGNOSIS — F43.29 ADJUSTMENT DISORDER WITH OTHER SYMPTOM: ICD-10-CM

## 2025-02-21 DIAGNOSIS — F41.9 ANXIETY: ICD-10-CM

## 2025-02-21 RX ORDER — BUPROPION HYDROCHLORIDE 300 MG/1
300 TABLET ORAL DAILY
Qty: 90 TABLET | Refills: 3 | Status: SHIPPED | OUTPATIENT
Start: 2025-02-21

## 2025-02-21 RX ORDER — LORAZEPAM 1 MG/1
1 TABLET ORAL EVERY 8 HOURS PRN
Qty: 90 TABLET | Refills: 0 | Status: SHIPPED | OUTPATIENT
Start: 2025-02-21

## 2025-02-21 RX ORDER — PEN NEEDLE, DIABETIC 32GX 5/32"
1 NEEDLE, DISPOSABLE MISCELLANEOUS DAILY
Qty: 100 EACH | Refills: 3 | Status: SHIPPED | OUTPATIENT
Start: 2025-02-21

## 2025-02-21 NOTE — TELEPHONE ENCOUNTER
Patient made aware of 24/7 emergency services. Rx Refill Note  Requested Prescriptions     Pending Prescriptions Disp Refills    Insulin Pen Needle (BD Pen Needle Melba U/F) 32G X 4 MM misc 100 each 3     Sig: Use 1 each Daily.      Last office visit with prescribing clinician: 12/11/2024      Next office visit with prescribing clinician: 6/25/2025       Padmini Escalante MA  02/21/25, 13:56 EST

## 2025-02-24 ENCOUNTER — OFFICE VISIT (OUTPATIENT)
Dept: CARDIOLOGY | Facility: CLINIC | Age: 70
End: 2025-02-24
Payer: MEDICARE

## 2025-02-24 VITALS
HEART RATE: 71 BPM | DIASTOLIC BLOOD PRESSURE: 56 MMHG | OXYGEN SATURATION: 99 % | HEIGHT: 60 IN | BODY MASS INDEX: 27.92 KG/M2 | SYSTOLIC BLOOD PRESSURE: 116 MMHG | WEIGHT: 142.2 LBS

## 2025-02-24 DIAGNOSIS — I65.22 LEFT CAROTID ARTERY OCCLUSION: ICD-10-CM

## 2025-02-24 DIAGNOSIS — E11.22 CKD STAGE 3 DUE TO TYPE 2 DIABETES MELLITUS: ICD-10-CM

## 2025-02-24 DIAGNOSIS — I65.21 STENOSIS OF RIGHT CAROTID ARTERY: Primary | ICD-10-CM

## 2025-02-24 DIAGNOSIS — N18.30 CKD STAGE 3 DUE TO TYPE 2 DIABETES MELLITUS: ICD-10-CM

## 2025-02-24 DIAGNOSIS — E78.2 HYPERLIPIDEMIA, MIXED: ICD-10-CM

## 2025-02-24 DIAGNOSIS — R07.2 PRECORDIAL PAIN: ICD-10-CM

## 2025-02-24 DIAGNOSIS — I10 HYPERTENSION, ESSENTIAL: ICD-10-CM

## 2025-02-24 DIAGNOSIS — N18.32 CHRONIC KIDNEY DISEASE, STAGE 3B: ICD-10-CM

## 2025-02-24 PROCEDURE — 99214 OFFICE O/P EST MOD 30 MIN: CPT | Performed by: INTERNAL MEDICINE

## 2025-02-24 PROCEDURE — 3074F SYST BP LT 130 MM HG: CPT | Performed by: INTERNAL MEDICINE

## 2025-02-24 PROCEDURE — 3078F DIAST BP <80 MM HG: CPT | Performed by: INTERNAL MEDICINE

## 2025-02-24 NOTE — PROGRESS NOTES
OFFICE VISIT  NOTE  [facility] CARDIOLOGY      Name: Shelley Leach    Date: 2025  MRN:  [id number]  :  1955      REFERRING/PRIMARY PROVIDER:  Tom Salas MD    Chief Complaint   Patient presents with    Stenosis of right carotid artery s/p RIGHT CEA        HPI: Shelley Leach is a 70 y.o. female who presents for carotid artery stenosis.  History of right CEA in , left ICA occluded last duplex 2022.  Overall doing well working on better glucose control last A1c was 7.6 but was previously 13.  On Repatha and low-dose atorvastatin doing well with these.  Does not exercise.  Low risk exercise nuclear stress test 2024 ordered by Dr. Salas.  Last carotid duplex 2024 unchanged from previous history of right CEA with less than 50% stenosis and occluded left ICA.  Overall doing well, denies chest pain shortness of breath or palpitations    Past Medical History:   Diagnosis Date    Allergic     Anemia     Anxiety     Arthritis     Bilateral carotid artery stenosis     Cataract 2016    Surgery    Cholelithiasis 1976    Surgery    Clostridioides difficile infection     15 YEARS AGO, TREATED    Colon polyp 2000    Coronary artery disease     Depression     Dietary counseling     diet education-Abstracted from [place]    Dietary counseling and surveillance     Diverticulosis     Elevated cholesterol     GERD (gastroesophageal reflux disease)     Goiter     Heart murmur     History of thyroid disorder     Thyroid problem-Abstracted from [place]    History of transfusion     NO REACTION    Hypertension     Hyperthyroidism 1996    Hypoglycemia associated with type 2 diabetes mellitus 2020    Hypothyroidism     Kidney stone 1981    Leaky heart valve     Obesity 1995    Gastric bypass in 2018    Peptic ulceration 2019    After gastric bypass    PONV (postoperative nausea and vomiting)     Renal insufficiency 2000    Type 2 diabetes  mellitus, uncontrolled     Visual impairment     Vitamin D deficiency        Past Surgical History:   Procedure Laterality Date    ADENOIDECTOMY      BARIATRIC SURGERY  10/18    BLEPHAROPLASTY Bilateral     CARDIAC CATHETERIZATION      NO INTERVENTION-20 YRS AGO    CAROTID ARTERY ANGIOPLASTY N/A     CAROTID ENDARTERECTOMY Right 2021    Procedure: CAROTID ENDARTERECTOMY WITH EEG RIGHT;  Surgeon: Javed Morris MD;  Location:  [facility];  Service: Vascular;  Laterality: Right;    CARPAL TUNNEL RELEASE Bilateral     CATARACT EXTRACTION      CHOLECYSTECTOMY      COLONOSCOPY      GASTRIC BYPASS      HERNIA REPAIR  2019    HYSTERECTOMY      INTERVENTIONAL RADIOLOGY PROCEDURE Bilateral 2021    Procedure: Carotid Cerebral Angiogram;  Surgeon: Sukumar Francis MD;  Location:  [facility];  Service: Interventional Radiology;  Laterality: Bilateral;    JOINT REPLACEMENT      Both shoulders    OVARIAN CYST SURGERY      x 2    ROTATOR CUFF REPAIR Left     SUBTOTAL HYSTERECTOMY      THYROIDECTOMY, PARTIAL      TONSILLECTOMY         Social History     Socioeconomic History    Marital status:     Number of children: 2   Tobacco Use    Smoking status: Never     Passive exposure: Never    Smokeless tobacco: Never    Tobacco comments:     Father smoked until i was 8   Vaping Use    Vaping status: Never Used   Substance and Sexual Activity    Alcohol use: Never    Drug use: Never    Sexual activity: Yes     Partners: Male     Birth control/protection: Vasectomy, Hysterectomy       Family History   Problem Relation Age of Onset    Diabetes Mother             Anemia Mother     Cancer Mother             Hypertension Mother             Anxiety disorder Mother             Arthritis Mother             Depression Mother             Hearing loss Mother             Miscarriages / Stillbirths Mother         -stillborn twins     Stroke Mother          from massive stroke 2023    Vision loss Mother             Diabetes Father             Hypertension Father             Thyroid disease Father             Kidney disease Father             Cancer Father         Decreased    Obesity Father     Arthritis Father             Hearing loss Father             Heart disease Father             Kidney disease Maternal Grandfather             Hypertension Maternal Grandmother             Obesity Paternal Grandmother     Cancer Maternal Aunt             Cancer Paternal Aunt         Breast    Diabetes Paternal Aunt     Cancer Maternal Uncle         Bladder    Diabetes Maternal Uncle     Hypertension Maternal Uncle     Kidney disease Maternal Uncle     Obesity Maternal Uncle     Cancer Maternal Aunt         Breast-    Diabetes Maternal Aunt             Diabetes Brother             Hypertension Brother             Kidney disease Brother             Obesity Brother     Cancer Brother             Thyroid disease Brother             Diabetes Maternal Uncle             Diabetes Maternal Aunt             Early death Maternal Aunt             Asthma Brother         Decreased        ROS:   Constitutional no fever,  no weight loss   Skin no rash, no subcutaneous nodules   Otolaryngeal no difficulty swallowing   Cardiovascular See HPI   Pulmonary no cough, no sputum production   Gastrointestinal no constipation, no diarrhea   Genitourinary no dysuria, no hematuria   Hematologic no easy bruisability, no abnormal bleeding   Musculoskeletal no muscle pain   Neurologic no dizziness, no falls         Allergies   Allergen Reactions    Trimethoprim Swelling    Percocet [Oxycodone-Acetaminophen] Itching    Morphine Other (See Comments)     HYPOTENSION    Sulfamethoxazole-Trimethoprim Swelling     Penicillins Rash         Current Outpatient Medications:     alendronate (Fosamax) 70 MG tablet, Take 1 tablet by mouth Every 7 (Seven) Days., Disp: 12 tablet, Rfl: 1    amLODIPine (NORVASC) 5 MG tablet, TAKE 1 TABLET EVERY DAY, Disp: 90 tablet, Rfl: 3    aspirin 81 MG EC tablet, Take 1 tablet by mouth Daily., Disp: , Rfl:     atorvastatin (LIPITOR) 10 MG tablet, TAKE 1 TABLET EVERY NIGHT, Disp: 90 tablet, Rfl: 1    Biotin 49340 MCG tablet dispersible, Place 10,000 mcg on the tongue 2 (two) times a day., Disp: , Rfl:     Blood Glucose Monitoring Suppl (Accu-Chek Guide) w/Device kit, Use 1 kit See Admin Instructions. Use to check blood sugar 4 times daily.  Dx E11.649, on insulin, Disp: 1 kit, Rfl: 0    buPROPion XL (WELLBUTRIN XL) 300 MG 24 hr tablet, TAKE 1 TABLET EVERY DAY, Disp: 90 tablet, Rfl: 3    cetirizine (zyrTEC) 10 MG tablet, Take 1 tablet by mouth Daily., Disp: 90 tablet, Rfl: 10    clopidogrel (PLAVIX) 75 MG tablet, TAKE 1 TABLET EVERY DAY, Disp: 90 tablet, Rfl: 3    Continuous Glucose Sensor (Dexcom G6 Sensor), USE AS DIRECTED AND CHANGE SENSOR EVERY 10 DAYS, Disp: 9 each, Rfl: 3    Cyanocobalamin (B-12 Compliance Injection) 1000 MCG/ML kit, Inject  as directed., Disp: , Rfl:     estradiol (ESTRACE) 0.1 MG/GM vaginal cream, INSERT 1G VAGINALY TWICE A WEEK AT BEDTIME, Disp: , Rfl:     famotidine (PEPCID) 40 MG tablet, , Disp: , Rfl:     gabapentin (NEURONTIN) 300 MG capsule, Take 1 capsule by mouth 3 (Three) Times a Day., Disp: 90 capsule, Rfl: 2    Glucagon (Gvoke HypoPen 2-Pack) 1 MG/0.2ML solution auto-injector, Inject 1 dose under the skin into the appropriate area as directed As Needed (for hypoglycemia)., Disp: 1 mL, Rfl: 3    glucose blood (Accu-Chek Guide) test strip, Check blood sugar 3 times daily dx e11.65 on insulin pump, Disp: 300 each, Rfl: 3    Insulin Glargine (LANTUS SOLOSTAR) 100 UNIT/ML injection pen, Inject 10 Units under the skin into the appropriate area as directed Every Night.,  "Disp: 15 mL, Rfl: 3    Insulin Pen Needle (BD Pen Needle Melba U/F) 32G X 4 MM misc, Use 1 each Daily., Disp: 100 each, Rfl: 3    Insulin Syringe-Needle U-100 30G X 5/16\" 1 ML misc, Use 1 each 3 times a day. Dx code e11.65, Disp: 300 each, Rfl: 3    Lancets 33G misc, Use 1 each 3 (Three) Times a Day. Dx e11.65 on insulin pump, Disp: 300 each, Rfl: 3    levothyroxine (Synthroid) 112 MCG tablet, Take 1 tablet by mouth Daily., Disp: 90 tablet, Rfl: 0    LORazepam (ATIVAN) 1 MG tablet, TAKE 1 TABLET EVERY 8 HOURS AS NEEDED FOR ANXIETY, Disp: 90 tablet, Rfl: 0    meclizine (ANTIVERT) 25 MG tablet, Take 1 tablet by mouth 3 (Three) Times a Day As Needed for Dizziness., Disp: 60 tablet, Rfl: 1    Mirabegron ER (Myrbetriq) 25 MG tablet sustained-release 24 hour 24 hr tablet, Take 1 tablet by mouth Daily., Disp: 90 tablet, Rfl: 0    multivitamin (THERAGRAN) tablet tablet, take 1 tablet by oral route  every day with food, Disp: , Rfl:     NovoLOG 100 UNIT/ML injection, INJECT 100 UNITS PER DAY VIA PUMP, Disp: 90 mL, Rfl: 3    Probiotic Product (TRUBIOTICS PO), Take 1 capsule by mouth Daily., Disp: , Rfl:     Repatha solution prefilled syringe injection, INJECT 1 ML UNDER THE SKIN INTO THE APPROPRIATE AREA AS DIRECTED EVERY 14 (FOURTEEN) DAYS., Disp: 6 mL, Rfl: 3    sertraline (ZOLOFT) 25 MG tablet, TAKE 1 TABLET EVERY DAY, Disp: 90 tablet, Rfl: 0    Current Facility-Administered Medications:     cyanocobalamin injection 1,000 mcg, 1,000 mcg, Intramuscular, Q28 Days, Tom Salas MD, 1,000 mcg at 03/30/23 1015    cyanocobalamin injection 1,000 mcg, 1,000 mcg, Intramuscular, Q28 Days, Tom Salas MD, 1,000 mcg at 10/01/24 1535    cyanocobalamin injection 1,000 mcg, 1,000 mcg, Intramuscular, Q28 Days, Tom Salas MD, 1,000 mcg at 12/28/23 1033    cyanocobalamin injection 1,000 mcg, 1,000 mcg, Intramuscular, Q28 Days, Tom Salas MD, 1,000 mcg at 05/28/24 0939    Facility-Administered Medications Ordered " "in Other Visits:     Chlorhexidine Gluconate Cloth 2 % pads 1 application, 1 application , Topical, Q12H PRN, Sarah Wan PA-C    Chlorhexidine Gluconate Cloth 2 % pads 1 application, 1 application , Topical, Q12H PRN, Jeffery Sumner PA    Vitals:    02/24/25 0943   BP: 116/56   Pulse: 71   SpO2: 99%   Weight: 64.5 kg (142 lb 3.2 oz)   Height: 152.4 cm (60\")     Body mass index is 27.77 kg/m².    PHYSICAL EXAM:    General Appearance:   · well developed  · well nourished  Neck:  · thyroid not enlarged  · supple, no bruit on exam  Respiratory:  · no respiratory distress  · normal breath sounds  · no rales  Cardiovascular:  · no jugular venous distention  · regular rhythm  · apical impulse normal  · S1 normal, S2 normal  · no S3, no S4   · no murmur  · no rub, no thrill  · carotid pulses normal; no bruit  · pedal pulses normal  · lower extremity edema: none    Skin:   warm, dry      RESULTS:   Procedures          Labs:  Lab Results   Component Value Date    TRIG 121 11/22/2024    HDL 48 11/22/2024    LDL 14 11/22/2024    AST 24 11/22/2024    ALT 17 11/22/2024     Lab Results   Component Value Date    HGBA1C 7.7 (H) 11/22/2024     No components found for: \"CREATINININE\"  eGFR Non  Amer   Date Value Ref Range Status   12/23/2021 58 (L) >60 mL/min/1.73 Final   12/21/2021 74 >60 mL/min/1.73 Final   11/09/2021 75 >60 mL/min/1.73 Final       Most recent PCP note, imaging tests, and labs reviewed.    ASSESSMENT:  Problem List Items Addressed This Visit       Stenosis of right carotid artery s/p RIGHT CEA 2021 - Primary    Relevant Orders    Duplex Carotid Ultrasound CAR    CKD stage 3 due to type 2 diabetes mellitus    Hypertension, essential    Chronic kidney disease, stage 3b    Hyperlipidemia, mixed    Left carotid artery occlusion    Chest pain       PLAN:    1.  Carotid stenosis:  History of right CEA by Dr. Morris 2021  Left ICA is occluded  Last duplex 2/2024, unchanged from previous, stable.  Yearly " carotid duplex ordered  Continue Repatha and statin  Increase exercise    2.  Hyperlipidemia:  Goal LDL less than 50 given advanced carotid stenosis  Continue Repatha and atorvastatin, reviewed most recent LDL was 14 which is excellent  No data to suggest a low LDL limit    3.  Diabetes mellitus type II:  We discussed the importance of metabolic health to reduce incidence of ASCVD events.    Advance Care Planning   ACP discussion was held with the patient during this visit. Patient has an advance directive (not in EMR), copy requested.         Return to clinic in 12 months, or sooner as needed.    Thank you for the opportunity to share in the care of your patient; please do not hesitate to call me with any questions.     Javed Leyva MD, [facility], [facility]  Office: [phone] [address] [place] [address]    02/24/25

## 2025-03-03 NOTE — PROGRESS NOTES
Patient Name: Shelley Leach  : 1955   MRN: [id number]     Chief Complaint:    Chief Complaint   Patient presents with    Hypertension       History of Present Illness: Shelley Leach is a 70 y.o. female who is here today for follow up on adjustment disorder vitamin D, and neuropathy.  HPI        Review of Systems:   Review of Systems   Constitutional: Negative.  Negative for chills, diaphoresis, fatigue and fever.   HENT: Negative.  Negative for congestion and sore throat.    Eyes: Negative.    Respiratory: Negative.  Negative for cough.    Cardiovascular: Negative.  Negative for chest pain.   Gastrointestinal: Negative.  Negative for abdominal pain, nausea and vomiting.   Genitourinary:  Negative for dysuria.   Musculoskeletal:  Negative for myalgias and neck pain.   Skin:  Negative for rash.   Neurological: Negative.  Negative for weakness, numbness and headaches.        Past Medical History:   Past Medical History:   Diagnosis Date    Allergic     Anemia     Anxiety     Arthritis     Bilateral carotid artery stenosis     Cataract 2016    Surgery    Cholelithiasis 1976    Surgery    Clostridioides difficile infection     15 YEARS AGO, TREATED    Colon polyp     Coronary artery disease     Depression     Dietary counseling     diet education-Abstracted from [facility]    Dietary counseling and surveillance     Diverticulosis     Elevated cholesterol     GERD (gastroesophageal reflux disease)     Goiter     Heart murmur     History of thyroid disorder     Thyroid problem-Abstracted from [facility]    History of transfusion     NO REACTION    Hypertension     Hyperthyroidism 1996    Hypoglycemia associated with type 2 diabetes mellitus 2020    Hypothyroidism     Kidney stone 1981    Leaky heart valve     Obesity 1995    Gastric bypass in 2018    Peptic ulceration 2019    After gastric bypass    PONV (postoperative nausea and vomiting)     Renal insufficiency      Type 2 diabetes mellitus, uncontrolled     Visual impairment     Vitamin D deficiency        Past Surgical History:   Past Surgical History:   Procedure Laterality Date    ADENOIDECTOMY      BARIATRIC SURGERY  10/18    BLEPHAROPLASTY Bilateral     CARDIAC CATHETERIZATION      NO INTERVENTION-20 YRS AGO    CAROTID ARTERY ANGIOPLASTY N/A     CAROTID ENDARTERECTOMY Right 2021    Procedure: CAROTID ENDARTERECTOMY WITH EEG RIGHT;  Surgeon: Javed Morris MD;  Location:  [facility];  Service: Vascular;  Laterality: Right;    CARPAL TUNNEL RELEASE Bilateral     CATARACT EXTRACTION      CHOLECYSTECTOMY      COLONOSCOPY      GASTRIC BYPASS      HERNIA REPAIR  2019    HYSTERECTOMY      INTERVENTIONAL RADIOLOGY PROCEDURE Bilateral 2021    Procedure: Carotid Cerebral Angiogram;  Surgeon: Sukumar Francis MD;  Location:  [facility];  Service: Interventional Radiology;  Laterality: Bilateral;    JOINT REPLACEMENT      Both shoulders    OVARIAN CYST SURGERY      x 2    ROTATOR CUFF REPAIR Left     SUBTOTAL HYSTERECTOMY      THYROIDECTOMY, PARTIAL      TONSILLECTOMY         Family History:   Family History   Problem Relation Age of Onset    Diabetes Mother             Anemia Mother     Cancer Mother             Hypertension Mother             Anxiety disorder Mother             Arthritis Mother             Depression Mother             Hearing loss Mother             Miscarriages / Stillbirths Mother         -stillborn twins    Stroke Mother          from massive stroke 2023    Vision loss Mother             Diabetes Father             Hypertension Father             Thyroid disease Father             Kidney disease Father             Cancer Father         Decreased    Obesity Father     Arthritis Father             Hearing loss Father              Heart disease Father             Kidney disease Maternal Grandfather             Hypertension Maternal Grandmother             Obesity Paternal Grandmother     Cancer Maternal Aunt             Cancer Paternal Aunt         Breast    Diabetes Paternal Aunt     Cancer Maternal Uncle         Bladder    Diabetes Maternal Uncle     Hypertension Maternal Uncle     Kidney disease Maternal Uncle     Obesity Maternal Uncle     Cancer Maternal Aunt         Breast-    Diabetes Maternal Aunt             Diabetes Brother             Hypertension Brother             Kidney disease Brother             Obesity Brother     Cancer Brother             Thyroid disease Brother             Diabetes Maternal Uncle             Diabetes Maternal Aunt             Early death Maternal Aunt             Asthma Brother         Decreased       Social History:   Social History     Socioeconomic History    Marital status:     Number of children: 2   Tobacco Use    Smoking status: Never     Passive exposure: Never    Smokeless tobacco: Never    Tobacco comments:     Father smoked until i was 8   Vaping Use    Vaping status: Never Used   Substance and Sexual Activity    Alcohol use: Never    Drug use: Never    Sexual activity: Yes     Partners: Male     Birth control/protection: Vasectomy, Hysterectomy       Medications:     Current Outpatient Medications:     alendronate (Fosamax) 70 MG tablet, Take 1 tablet by mouth Every 7 (Seven) Days., Disp: 12 tablet, Rfl: 1    amLODIPine (NORVASC) 5 MG tablet, TAKE 1 TABLET EVERY DAY, Disp: 90 tablet, Rfl: 3    aspirin 81 MG EC tablet, Take 1 tablet by mouth Daily., Disp: , Rfl:     atorvastatin (LIPITOR) 10 MG tablet, TAKE 1 TABLET EVERY NIGHT, Disp: 90 tablet, Rfl: 1    Biotin 24898 MCG tablet dispersible, Place 10,000 mcg on the tongue 2 (two) times a day., Disp: , Rfl:     Blood Glucose  "Monitoring Suppl (Accu-Chek Guide) w/Device kit, Use 1 kit See Admin Instructions. Use to check blood sugar 4 times daily.  Dx E11.649, on insulin, Disp: 1 kit, Rfl: 0    buPROPion XL (WELLBUTRIN XL) 300 MG 24 hr tablet, TAKE 1 TABLET EVERY DAY, Disp: 90 tablet, Rfl: 3    cetirizine (zyrTEC) 10 MG tablet, Take 1 tablet by mouth Daily., Disp: 90 tablet, Rfl: 10    clopidogrel (PLAVIX) 75 MG tablet, TAKE 1 TABLET EVERY DAY, Disp: 90 tablet, Rfl: 3    Continuous Glucose Sensor (Dexcom G6 Sensor), USE AS DIRECTED AND CHANGE SENSOR EVERY 10 DAYS, Disp: 9 each, Rfl: 3    Cyanocobalamin (B-12 Compliance Injection) 1000 MCG/ML kit, Inject  as directed., Disp: , Rfl:     estradiol (ESTRACE) 0.1 MG/GM vaginal cream, INSERT 1G VAGINALY TWICE A WEEK AT BEDTIME, Disp: , Rfl:     famotidine (PEPCID) 40 MG tablet, , Disp: , Rfl:     gabapentin (NEURONTIN) 300 MG capsule, Take 1 capsule by mouth 3 (Three) Times a Day., Disp: 270 capsule, Rfl: 0    Glucagon (Gvoke HypoPen 2-Pack) 1 MG/0.2ML solution auto-injector, Inject 1 dose under the skin into the appropriate area as directed As Needed (for hypoglycemia)., Disp: 1 mL, Rfl: 3    glucose blood (Accu-Chek Guide) test strip, Check blood sugar 3 times daily dx e11.65 on insulin pump, Disp: 300 each, Rfl: 3    Insulin Glargine (LANTUS SOLOSTAR) 100 UNIT/ML injection pen, Inject 10 Units under the skin into the appropriate area as directed Every Night., Disp: 15 mL, Rfl: 3    Insulin Pen Needle (BD Pen Needle Melba U/F) 32G X 4 MM misc, Use 1 each Daily., Disp: 100 each, Rfl: 3    Insulin Syringe-Needle U-100 30G X 5/16\" 1 ML misc, Use 1 each 3 times a day. Dx code e11.65, Disp: 300 each, Rfl: 3    Lancets 33G misc, Use 1 each 3 (Three) Times a Day. Dx e11.65 on insulin pump, Disp: 300 each, Rfl: 3    levothyroxine (Synthroid) 112 MCG tablet, Take 1 tablet by mouth Daily., Disp: 90 tablet, Rfl: 0    LORazepam (ATIVAN) 1 MG tablet, Take 1 tablet by mouth Every 8 (Eight) Hours As Needed " for Anxiety., Disp: 90 tablet, Rfl: 0    meclizine (ANTIVERT) 25 MG tablet, Take 1 tablet by mouth 3 (Three) Times a Day As Needed for Dizziness., Disp: 60 tablet, Rfl: 1    Mirabegron ER (Myrbetriq) 25 MG tablet sustained-release 24 hour 24 hr tablet, Take 1 tablet by mouth Daily., Disp: 90 tablet, Rfl: 0    multivitamin (THERAGRAN) tablet tablet, take 1 tablet by oral route  every day with food, Disp: , Rfl:     NovoLOG 100 UNIT/ML injection, INJECT 100 UNITS PER DAY VIA PUMP, Disp: 90 mL, Rfl: 3    Probiotic Product (TRUBIOTICS PO), Take 1 capsule by mouth Daily., Disp: , Rfl:     Repatha solution prefilled syringe injection, INJECT 1 ML UNDER THE SKIN INTO THE APPROPRIATE AREA AS DIRECTED EVERY 14 (FOURTEEN) DAYS., Disp: 6 mL, Rfl: 3    Current Facility-Administered Medications:     cyanocobalamin injection 1,000 mcg, 1,000 mcg, Intramuscular, Q28 Days, Tom Salas MD, 1,000 mcg at 03/30/23 1015    cyanocobalamin injection 1,000 mcg, 1,000 mcg, Intramuscular, Q28 Days, Tom Salas MD, 1,000 mcg at 10/01/24 1535    cyanocobalamin injection 1,000 mcg, 1,000 mcg, Intramuscular, Q28 Days, Tom Salas MD, 1,000 mcg at 12/28/23 1033    cyanocobalamin injection 1,000 mcg, 1,000 mcg, Intramuscular, Q28 Days, Tom Salas MD, 1,000 mcg at 05/28/24 0939    Facility-Administered Medications Ordered in Other Visits:     Chlorhexidine Gluconate Cloth 2 % pads 1 application, 1 application , Topical, Q12H PRN, Sarah Wan PA-C    Chlorhexidine Gluconate Cloth 2 % pads 1 application, 1 application , Topical, Q12H PRN, Jeffery Sumner PA    Allergies:   Allergies   Allergen Reactions    Trimethoprim Swelling    Percocet [Oxycodone-Acetaminophen] Itching    Morphine Other (See Comments)     HYPOTENSION    Sulfamethoxazole-Trimethoprim Swelling    Penicillins Rash         Physical Exam:  Vital Signs:   Vitals:    03/04/25 0957   BP: 130/80   BP Location: Left arm   Patient Position: Sitting   Cuff  "Size: Adult   Pulse: 54   Resp: 16   SpO2: 100%   Weight: 64.5 kg (142 lb 3.2 oz)   Height: 152.4 cm (60\")     Body mass index is 27.77 kg/m².     Physical Exam  Vitals and nursing note reviewed.   Constitutional:       Appearance: Normal appearance. She is normal weight.   HENT:      Head: Normocephalic and atraumatic.      Right Ear: Tympanic membrane, ear canal and external ear normal.      Left Ear: Tympanic membrane, ear canal and external ear normal.      Nose: Nose normal.      Mouth/Throat:      Mouth: Mucous membranes are dry.      Pharynx: Oropharynx is clear.   Eyes:      Extraocular Movements: Extraocular movements intact.      Conjunctiva/sclera: Conjunctivae normal.      Pupils: Pupils are equal, round, and reactive to light.   Cardiovascular:      Rate and Rhythm: Normal rate and regular rhythm.      Pulses: Normal pulses.      Heart sounds: Normal heart sounds.   Pulmonary:      Effort: Pulmonary effort is normal.      Breath sounds: Normal breath sounds.   Musculoskeletal:      Cervical back: Normal range of motion and neck supple.   Feet:      Comments:      Neurological:      Mental Status: She is alert.         Procedures      Assessment/Plan:   Diagnoses and all orders for this visit:    1. Adjustment disorder with other symptom (Primary)  Assessment & Plan:  Patient's daughter is really struggling.  We are going to refill her Ativan today.  House bill 1      2. Neuropathy  Assessment & Plan:  Refill gabapentin.  House bill 1    Orders:  -     gabapentin (NEURONTIN) 300 MG capsule; Take 1 capsule by mouth 3 (Three) Times a Day.  Dispense: 270 capsule; Refill: 0    3. Vitamin D deficiency  Assessment & Plan:  Blood work in 3 months      4. Uncontrolled type 2 diabetes mellitus with hyperglycemia  Assessment & Plan:  Followed by [facility]    Orders:  -     Measles / Mumps / Rubella Immunity; Future    5. Acquired hypothyroidism  Assessment & Plan:  Patient followed by Dr. Talbot      6. B12 " deficiency  Assessment & Plan:  Blood work in 3 months      7. Anxiety  -     LORazepam (ATIVAN) 1 MG tablet; Take 1 tablet by mouth Every 8 (Eight) Hours As Needed for Anxiety.  Dispense: 90 tablet; Refill: 0    Other orders  -     alendronate (Fosamax) 70 MG tablet; Take 1 tablet by mouth Every 7 (Seven) Days.  Dispense: 12 tablet; Refill: 1             Follow Up:   No follow-ups on file.      Tom Salas MD  [facility]

## 2025-03-04 ENCOUNTER — OFFICE VISIT (OUTPATIENT)
Dept: FAMILY MEDICINE CLINIC | Facility: CLINIC | Age: 70
End: 2025-03-04
Payer: MEDICARE

## 2025-03-04 VITALS
RESPIRATION RATE: 16 BRPM | SYSTOLIC BLOOD PRESSURE: 130 MMHG | HEART RATE: 54 BPM | BODY MASS INDEX: 27.92 KG/M2 | DIASTOLIC BLOOD PRESSURE: 80 MMHG | OXYGEN SATURATION: 100 % | HEIGHT: 60 IN | WEIGHT: 142.2 LBS

## 2025-03-04 DIAGNOSIS — E11.65 UNCONTROLLED TYPE 2 DIABETES MELLITUS WITH HYPERGLYCEMIA: ICD-10-CM

## 2025-03-04 DIAGNOSIS — F41.9 ANXIETY: ICD-10-CM

## 2025-03-04 DIAGNOSIS — E55.9 VITAMIN D DEFICIENCY: ICD-10-CM

## 2025-03-04 DIAGNOSIS — F43.29 ADJUSTMENT DISORDER WITH OTHER SYMPTOM: Primary | ICD-10-CM

## 2025-03-04 DIAGNOSIS — G62.9 NEUROPATHY: ICD-10-CM

## 2025-03-04 DIAGNOSIS — E03.9 ACQUIRED HYPOTHYROIDISM: ICD-10-CM

## 2025-03-04 DIAGNOSIS — E53.8 B12 DEFICIENCY: ICD-10-CM

## 2025-03-04 PROCEDURE — 3079F DIAST BP 80-89 MM HG: CPT | Performed by: FAMILY MEDICINE

## 2025-03-04 PROCEDURE — 3075F SYST BP GE 130 - 139MM HG: CPT | Performed by: FAMILY MEDICINE

## 2025-03-04 PROCEDURE — 1126F AMNT PAIN NOTED NONE PRSNT: CPT | Performed by: FAMILY MEDICINE

## 2025-03-04 PROCEDURE — 99214 OFFICE O/P EST MOD 30 MIN: CPT | Performed by: FAMILY MEDICINE

## 2025-03-04 RX ORDER — ALENDRONATE SODIUM 70 MG/1
70 TABLET ORAL
Qty: 12 TABLET | Refills: 1 | Status: SHIPPED | OUTPATIENT
Start: 2025-03-04

## 2025-03-04 RX ORDER — GABAPENTIN 300 MG/1
300 CAPSULE ORAL 3 TIMES DAILY
Qty: 270 CAPSULE | Refills: 0 | Status: SHIPPED | OUTPATIENT
Start: 2025-03-04

## 2025-03-04 RX ORDER — LORAZEPAM 1 MG/1
1 TABLET ORAL EVERY 8 HOURS PRN
Qty: 90 TABLET | Refills: 0 | Status: SHIPPED | OUTPATIENT
Start: 2025-03-04

## 2025-03-05 ENCOUNTER — TELEPHONE (OUTPATIENT)
Dept: CARDIOLOGY | Facility: CLINIC | Age: 70
End: 2025-03-05
Payer: MEDICARE

## 2025-03-05 LAB
Lab: NORMAL
MEV IGG SER IA-ACNC: >300 AU/ML
MUV IGG SER IA-ACNC: 22.8 AU/ML
RUBV IGG SERPL IA-ACNC: 1.97 INDEX

## 2025-03-17 DIAGNOSIS — F41.9 ANXIETY: ICD-10-CM

## 2025-03-17 RX ORDER — LORAZEPAM 1 MG/1
1 TABLET ORAL EVERY 8 HOURS PRN
Qty: 90 TABLET | OUTPATIENT
Start: 2025-03-17

## 2025-04-04 PROBLEM — Z00.00 MEDICARE ANNUAL WELLNESS VISIT, SUBSEQUENT: Status: ACTIVE | Noted: 2022-05-27

## 2025-04-11 ENCOUNTER — OFFICE VISIT (OUTPATIENT)
Dept: FAMILY MEDICINE CLINIC | Facility: CLINIC | Age: 70
End: 2025-04-11
Payer: MEDICARE

## 2025-04-11 VITALS
BODY MASS INDEX: 27.65 KG/M2 | OXYGEN SATURATION: 99 % | DIASTOLIC BLOOD PRESSURE: 72 MMHG | HEART RATE: 58 BPM | WEIGHT: 141.6 LBS | SYSTOLIC BLOOD PRESSURE: 118 MMHG

## 2025-04-11 DIAGNOSIS — E55.9 VITAMIN D DEFICIENCY: ICD-10-CM

## 2025-04-11 DIAGNOSIS — Z00.00 PHYSICAL EXAM: ICD-10-CM

## 2025-04-11 DIAGNOSIS — F43.29 ADJUSTMENT DISORDER WITH OTHER SYMPTOM: ICD-10-CM

## 2025-04-11 DIAGNOSIS — E03.9 ACQUIRED HYPOTHYROIDISM: ICD-10-CM

## 2025-04-11 DIAGNOSIS — E11.22 CKD STAGE 3 DUE TO TYPE 2 DIABETES MELLITUS: ICD-10-CM

## 2025-04-11 DIAGNOSIS — E78.2 HYPERLIPIDEMIA, MIXED: ICD-10-CM

## 2025-04-11 DIAGNOSIS — N18.32 CHRONIC KIDNEY DISEASE, STAGE 3B: ICD-10-CM

## 2025-04-11 DIAGNOSIS — G62.9 NEUROPATHY: ICD-10-CM

## 2025-04-11 DIAGNOSIS — F41.9 ANXIETY: ICD-10-CM

## 2025-04-11 DIAGNOSIS — M15.9 OSTEOARTHRITIS OF MULTIPLE JOINTS, UNSPECIFIED OSTEOARTHRITIS TYPE: ICD-10-CM

## 2025-04-11 DIAGNOSIS — Z79.4 TYPE 2 DIABETES MELLITUS WITH OTHER CIRCULATORY COMPLICATION, WITH LONG-TERM CURRENT USE OF INSULIN: ICD-10-CM

## 2025-04-11 DIAGNOSIS — E11.59 TYPE 2 DIABETES MELLITUS WITH OTHER CIRCULATORY COMPLICATION, WITH LONG-TERM CURRENT USE OF INSULIN: ICD-10-CM

## 2025-04-11 DIAGNOSIS — E53.8 B12 DEFICIENCY: ICD-10-CM

## 2025-04-11 DIAGNOSIS — I10 HYPERTENSION, ESSENTIAL: ICD-10-CM

## 2025-04-11 DIAGNOSIS — Z00.00 MEDICARE ANNUAL WELLNESS VISIT, SUBSEQUENT: Primary | ICD-10-CM

## 2025-04-11 DIAGNOSIS — N18.30 CKD STAGE 3 DUE TO TYPE 2 DIABETES MELLITUS: ICD-10-CM

## 2025-04-11 RX ORDER — LOSARTAN POTASSIUM 100 MG/1
100 TABLET ORAL DAILY
COMMUNITY
Start: 2025-03-19

## 2025-04-11 RX ORDER — LORAZEPAM 1 MG/1
1 TABLET ORAL EVERY 12 HOURS PRN
Qty: 45 TABLET | Refills: 2 | Status: SHIPPED | OUTPATIENT
Start: 2025-04-11

## 2025-04-11 RX ORDER — TRAMADOL HYDROCHLORIDE 50 MG/1
50 TABLET ORAL DAILY PRN
Qty: 10 TABLET | Refills: 2 | Status: SHIPPED | OUTPATIENT
Start: 2025-04-11

## 2025-04-11 RX ORDER — RANOLAZINE 500 MG/1
500 TABLET, EXTENDED RELEASE ORAL 2 TIMES DAILY
COMMUNITY
End: 2025-04-11

## 2025-04-11 NOTE — PROGRESS NOTES
Subjective   The ABCs of the Annual Wellness Visit  Medicare Wellness Visit      Shelley Leach is a 70 y.o. patient who presents for a Medicare Wellness Visit.  Low back pain.  And adjustment disorder    The following portions of the patient's history were reviewed and   updated as appropriate: allergies, current medications, past family history, past medical history, past social history, past surgical history, and problem list.    Compared to one year ago, the patient's physical   health is the same.  Compared to one year ago, the patient's mental   health is worse.    Recent Hospitalizations:  She was admitted within the past 365 days at [facility].     Current Medical Providers:  Patient Care Team:  Tom Salas MD as PCP - General  Tom Salas MD as PCP - Family Medicine  Javed Morris MD as Referring Physician (Cardiothoracic Surgery)  Sonu Talbot MD as Consulting Physician (Endocrinology)  Brice Cooley MD as Cardiologist (Internal Medicine)    Outpatient Medications Prior to Visit   Medication Sig Dispense Refill    losartan (COZAAR) 100 MG tablet Take 1 tablet by mouth Daily.      alendronate (Fosamax) 70 MG tablet Take 1 tablet by mouth Every 7 (Seven) Days. 12 tablet 1    amLODIPine (NORVASC) 5 MG tablet TAKE 1 TABLET EVERY DAY 90 tablet 3    aspirin 81 MG EC tablet Take 1 tablet by mouth Daily.      atorvastatin (LIPITOR) 10 MG tablet TAKE 1 TABLET EVERY NIGHT 90 tablet 1    Biotin 11589 MCG tablet dispersible Place 10,000 mcg on the tongue 2 (two) times a day.      Blood Glucose Monitoring Suppl (Accu-Chek Guide) w/Device kit Use 1 kit See Admin Instructions. Use to check blood sugar 4 times daily.  Dx E11.649, on insulin 1 kit 0    buPROPion XL (WELLBUTRIN XL) 300 MG 24 hr tablet TAKE 1 TABLET EVERY DAY 90 tablet 3    cetirizine (zyrTEC) 10 MG tablet Take 1 tablet by mouth Daily. 90 tablet 10    clopidogrel (PLAVIX) 75 MG tablet TAKE 1 TABLET EVERY DAY 90  "tablet 3    Cyanocobalamin (B-12 Compliance Injection) 1000 MCG/ML kit Inject  as directed.      estradiol (ESTRACE) 0.1 MG/GM vaginal cream INSERT 1G VAGINALY TWICE A WEEK AT BEDTIME      famotidine (PEPCID) 40 MG tablet       gabapentin (NEURONTIN) 300 MG capsule Take 1 capsule by mouth 3 (Three) Times a Day. 270 capsule 0    Glucagon (Gvoke HypoPen 2-Pack) 1 MG/0.2ML solution auto-injector Inject 1 dose under the skin into the appropriate area as directed As Needed (for hypoglycemia). 1 mL 3    glucose blood (Accu-Chek Guide) test strip Check blood sugar 3 times daily dx e11.65 on insulin pump 300 each 3    Insulin Glargine (LANTUS SOLOSTAR) 100 UNIT/ML injection pen Inject 10 Units under the skin into the appropriate area as directed Every Night. 15 mL 3    Insulin Pen Needle (BD Pen Needle Melba U/F) 32G X 4 MM misc Use 1 each Daily. 100 each 3    Insulin Syringe-Needle U-100 30G X 5/16\" 1 ML misc Use 1 each 3 times a day. Dx code e11.65 300 each 3    Lancets 33G misc Use 1 each 3 (Three) Times a Day. Dx e11.65 on insulin pump 300 each 3    levothyroxine (Synthroid) 112 MCG tablet Take 1 tablet by mouth Daily. 90 tablet 0    meclizine (ANTIVERT) 25 MG tablet Take 1 tablet by mouth 3 (Three) Times a Day As Needed for Dizziness. 60 tablet 1    Mirabegron ER (Myrbetriq) 25 MG tablet sustained-release 24 hour 24 hr tablet Take 1 tablet by mouth Daily. 90 tablet 0    multivitamin (THERAGRAN) tablet tablet take 1 tablet by oral route  every day with food      NovoLOG 100 UNIT/ML injection INJECT 100 UNITS PER DAY VIA PUMP 90 mL 3    Probiotic Product (TRUBIOTICS PO) Take 1 capsule by mouth Daily.      Repatha solution prefilled syringe injection INJECT 1 ML UNDER THE SKIN INTO THE APPROPRIATE AREA AS DIRECTED EVERY 14 (FOURTEEN) DAYS. 6 mL 3    Continuous Glucose Sensor (Dexcom G6 Sensor) USE AS DIRECTED AND CHANGE SENSOR EVERY 10 DAYS 9 each 3    LORazepam (ATIVAN) 1 MG tablet Take 1 tablet by mouth Every 8 (Eight) " Hours As Needed for Anxiety. 90 tablet 0    ranolazine (RANEXA) 500 MG 12 hr tablet Take 1 tablet by mouth 2 (Two) Times a Day.       Facility-Administered Medications Prior to Visit   Medication Dose Route Frequency Provider Last Rate Last Admin    Chlorhexidine Gluconate Cloth 2 % pads 1 application  1 application  Topical Q12H PRN Sarah Wan PA-C        Chlorhexidine Gluconate Cloth 2 % pads 1 application  1 application  Topical Q12H PRN Jeffery Sumner PA        cyanocobalamin injection 1,000 mcg  1,000 mcg Intramuscular Q28 Days Tom Salas MD   1,000 mcg at 03/30/23 1015    cyanocobalamin injection 1,000 mcg  1,000 mcg Intramuscular Q28 Days Tom Salas MD   1,000 mcg at 10/01/24 1535    cyanocobalamin injection 1,000 mcg  1,000 mcg Intramuscular Q28 Days Tom Salas MD   1,000 mcg at 12/28/23 1033    cyanocobalamin injection 1,000 mcg  1,000 mcg Intramuscular Q28 Days Tom Salas MD   1,000 mcg at 05/28/24 0939     Opioid medication/s are on active medication list.  and I have evaluated her active treatment plan and pain score trends (see table).  There were no vitals filed for this visit.  I have reviewed the chart for potential of high risk medication and harmful drug interactions in the elderly.        Aspirin is on active medication list. Aspirin use is indicated based on review of current medical condition/s. Pros and cons of this therapy have been discussed today. Benefits of this medication outweigh potential harm.  Patient has been encouraged to continue taking this medication.  .      Patient Active Problem List   Diagnosis    Acquired hypothyroidism    Hypercholesteremia    Hypoglycemia associated with type 2 diabetes mellitus    Uncontrolled type 2 diabetes mellitus with hyperglycemia    Vitamin D deficiency    Stenosis of right carotid artery s/p RIGHT CEA 2021    Osteoarthritis    Gastroesophageal reflux disease    CKD stage 3 due to type 2 diabetes mellitus     "Hypertension, essential    B12 deficiency    Chronic kidney disease, stage 3b    Medicare annual wellness visit, subsequent    Hyperlipidemia, mixed    Incontinence of feces    Left carotid artery occlusion    Decreased libido    Adjustment disorder    Burn    Skin rash    Anxiety    Neuropathy    Insect bite of left lower leg    Sprain of left ankle    Dizziness    Elevated liver function tests    Epistaxis    Vestibular dysfunction    Nonintractable headache    Chest pain    Macrocytic anemia    Syncope     Advance Care Planning Advance Directive is not on file.  ACP discussion was held with the patient during this visit. Patient does not have an advance directive, information provided.            Objective   Vitals:    25 1331   BP: 118/72   BP Location: Left arm   Patient Position: Sitting   Cuff Size: Adult   Pulse: 58   SpO2: 99%   Weight: 64.2 kg (141 lb 9.6 oz)       Estimated body mass index is 27.65 kg/m² as calculated from the following:    Height as of 3/4/25: 152.4 cm (60\").    Weight as of this encounter: 64.2 kg (141 lb 9.6 oz).                Does the patient have evidence of cognitive impairment? No                                                                                                Health  Risk Assessment    Smoking Status:  Social History     Tobacco Use   Smoking Status Never    Passive exposure: Never   Smokeless Tobacco Never   Tobacco Comments    Father smoked until i was 8     Alcohol Consumption:  Social History     Substance and Sexual Activity   Alcohol Use Never       Fall Risk Screen  STEADI Fall Risk Assessment was completed, and patient is at LOW risk for falls.Assessment completed on:3/4/2025    Depression Screening   The PHQ has not been completed during this encounter.     Health Habits and Functional and Cognitive Screenin/10/2025     3:19 PM   Functional & Cognitive Status   Do you have difficulty preparing food and eating? No   Do you have difficulty " bathing yourself, getting dressed or grooming yourself? No   Do you have difficulty using the toilet? No   Do you have difficulty moving around from place to place? No   Do you have trouble with steps or getting out of a bed or a chair? No   Current Diet Unhealthy Diet   Dental Exam Up to date   Eye Exam Up to date   Exercise (times per week) 0 times per week   Current Exercises Include No Regular Exercise   Do you need help using the phone?  No   Are you deaf or do you have serious difficulty hearing?  No   Do you need help to go to places out of walking distance? No   Do you need help shopping? No   Do you need help preparing meals?  No   Do you need help with housework?  No   Do you need help with laundry? No   Do you need help taking your medications? No   Do you need help managing money? No   Do you ever drive or ride in a car without wearing a seat belt? No   Have you felt unusual stress, anger or loneliness in the last month? No   Who do you live with? Spouse   If you need help, do you have trouble finding someone available to you? No   Have you been bothered in the last four weeks by sexual problems? No   Do you have difficulty concentrating, remembering or making decisions? No           Age-appropriate Screening Schedule:  Refer to the list below for future screening recommendations based on patient's age, sex and/or medical conditions. Orders for these recommended tests are listed in the plan section. The patient has been provided with a written plan.    Health Maintenance List  Health Maintenance   Topic Date Due    DIABETIC EYE EXAM  11/15/2024    URINE MICROALBUMIN-CREATININE RATIO (uACR)  03/29/2025    COVID-19 Vaccine (8 - 2024-25 season) 04/01/2025    HEMOGLOBIN A1C  05/22/2025    INFLUENZA VACCINE  07/01/2025    LIPID PANEL  11/22/2025    DIABETIC FOOT EXAM  12/04/2025    ANNUAL WELLNESS VISIT  04/11/2026    MAMMOGRAM  10/09/2026    DXA SCAN  02/12/2027    COLORECTAL CANCER SCREENING  02/22/2032     TDAP/TD VACCINES (3 - Td or Tdap) 03/30/2033    HEPATITIS C SCREENING  Completed    Pneumococcal Vaccine 50+  Completed    ZOSTER VACCINE  Completed                                                                                                                                                CMS Preventative Services Quick Reference  Risk Factors Identified During Encounter  None Identified    The above risks/problems have been discussed with the patient.  Pertinent information has been shared with the patient in the After Visit Summary.  An After Visit Summary and PPPS were made available to the patient.    Follow Up:   Next Medicare Wellness visit to be scheduled in 1 year.         Additional E&M Note during same encounter follows:  Patient has additional, significant, and separately identifiable condition(s)/problem(s) that require work above and beyond the Medicare Wellness Visit     Chief Complaint  Medicare Wellness-subsequent    Subjective   HPI  Al[place] is also being seen today for an annual adult preventative physical exam.  and Alberta is also being seen today for additional medical problem/s.                Objective   Vital Signs:  /72 (BP Location: Left arm, Patient Position: Sitting, Cuff Size: Adult)   Pulse 58   Wt 64.2 kg (141 lb 9.6 oz)   SpO2 99%   BMI 27.65 kg/m²   Physical Exam               Assessment and Plan Additional age appropriate preventative wellness advice topics were discussed during today's preventative wellness exam(some topics already addressed during AWV portion of the note above):    Physical Activity: Advised cardiovascular activity 150 minutes per week as tolerated. (example brisk walk for 30 minutes, 5 days a week).     Nutrition: Discussed nutrition plan with patient. Information shared in after visit summary. Goal is for a well balanced diet to enhance overall health.     Healthy Weight: Discussed current and goal BMI with patient. Steps to attain this goal  discussed. Information shared in after visit summary.     Medicare annual wellness visit, subsequent  We discussed maintenance, diet, exercise, and immunizations.         Physical exam  We discussed maintenance, diet, exercise, and immunizations.         Hypertension, essential  Discussed with patient to monitor their blood pressure and if systolic blood pressure goes above 140 or diastolic is above 90 to return to clinic.  Take medicines as directed, call for any problems, patient not having or any worrisome symptoms.             Hyperlipidemia, mixed  Blood work         Acquired hypothyroidism  Patient followed by Dr. Talbot         B12 deficiency  Blood work          CKD stage 3 due to type 2 diabetes mellitus  GFR is 35.Patient is instructed to not take any NSAIDs.  Medicines as directed.  Stay well-hydrated.           Vitamin D deficiency         Adjustment disorder with other symptom  Patient's daughter is really struggling.  We are going to refill her Ativan today.  House bill 1         Chronic kidney disease, stage 3b  Patient is instructed to not take any NSAIDs.  Medicines as directed.  Stay well-hydrated.           Neuropathy         Anxiety    Orders:    LORazepam (ATIVAN) 1 MG tablet; Take 1 tablet by mouth Every 12 (Twelve) Hours As Needed for Anxiety.    Osteoarthritis of multiple joints, unspecified osteoarthritis type  Patient has CKD.  Cannot take ibuprofen.  I am going to give her some tramadol to take as needed.  But only 10 a month.    Orders:    traMADol (ULTRAM) 50 MG tablet; Take 1 tablet by mouth Daily As Needed for Moderate Pain.    Type 2 diabetes mellitus with other circulatory complication, with long-term current use of insulin      Orders:    Microalbumin / Creatinine Urine Ratio - Urine, Clean Catch; Future            Follow Up   No follow-ups on file.  Patient was given instructions and counseling regarding her condition or for health maintenance advice. Please see specific information  pulled into the AVS if appropriate.

## 2025-04-11 NOTE — ASSESSMENT & PLAN NOTE
Patient's daughter is really struggling.  We are going to refill her Ativan today.  House bill 1

## 2025-04-11 NOTE — ASSESSMENT & PLAN NOTE
Patient has CKD.  Cannot take ibuprofen.  I am going to give her some tramadol to take as needed.  But only 10 a month.    Orders:    traMADol (ULTRAM) 50 MG tablet; Take 1 tablet by mouth Daily As Needed for Moderate Pain.

## 2025-04-11 NOTE — ASSESSMENT & PLAN NOTE
Patient is instructed to not take any NSAIDs.  Medicines as directed.  Stay well-hydrated.

## 2025-04-11 NOTE — ASSESSMENT & PLAN NOTE
Orders:    LORazepam (ATIVAN) 1 MG tablet; Take 1 tablet by mouth Every 12 (Twelve) Hours As Needed for Anxiety.

## 2025-04-11 NOTE — ASSESSMENT & PLAN NOTE
Discussed with patient to monitor their blood pressure and if systolic blood pressure goes above 140 or diastolic is above 90 to return to clinic.  Take medicines as directed, call for any problems, patient not having or any worrisome symptoms.

## 2025-04-12 LAB
ALBUMIN/CREAT UR: 739 MG/G CREAT (ref 0–29)
CREAT UR-MCNC: 39.9 MG/DL
MICROALBUMIN UR-MCNC: 295 UG/ML

## 2025-05-07 RX ORDER — DAPAGLIFLOZIN 5 MG/1
5 TABLET, FILM COATED ORAL DAILY
Qty: 30 TABLET | Refills: 5 | Status: SHIPPED | OUTPATIENT
Start: 2025-05-07

## 2025-05-08 ENCOUNTER — CLINICAL SUPPORT (OUTPATIENT)
Dept: FAMILY MEDICINE CLINIC | Facility: CLINIC | Age: 70
End: 2025-05-08
Payer: MEDICARE

## 2025-05-08 DIAGNOSIS — E53.8 B12 DEFICIENCY: Primary | ICD-10-CM

## 2025-05-08 PROCEDURE — 96372 THER/PROPH/DIAG INJ SC/IM: CPT | Performed by: FAMILY MEDICINE

## 2025-05-08 RX ADMIN — CYANOCOBALAMIN 1000 MCG: 1000 INJECTION, SOLUTION INTRAMUSCULAR; SUBCUTANEOUS at 15:06

## 2025-05-27 DIAGNOSIS — E78.2 HYPERLIPIDEMIA, MIXED: ICD-10-CM

## 2025-05-27 RX ORDER — ATORVASTATIN CALCIUM 10 MG/1
10 TABLET, FILM COATED ORAL NIGHTLY
Qty: 90 TABLET | Refills: 0 | Status: SHIPPED | OUTPATIENT
Start: 2025-05-27

## 2025-06-03 ENCOUNTER — LAB (OUTPATIENT)
Dept: FAMILY MEDICINE CLINIC | Facility: CLINIC | Age: 70
End: 2025-06-03
Payer: MEDICARE

## 2025-06-03 ENCOUNTER — CLINICAL SUPPORT (OUTPATIENT)
Dept: FAMILY MEDICINE CLINIC | Facility: CLINIC | Age: 70
End: 2025-06-03
Payer: MEDICARE

## 2025-06-03 DIAGNOSIS — N18.30 CKD STAGE 3 DUE TO TYPE 2 DIABETES MELLITUS: ICD-10-CM

## 2025-06-03 DIAGNOSIS — E53.8 B12 DEFICIENCY: Primary | ICD-10-CM

## 2025-06-03 DIAGNOSIS — E11.22 CKD STAGE 3 DUE TO TYPE 2 DIABETES MELLITUS: ICD-10-CM

## 2025-06-03 DIAGNOSIS — E03.9 ACQUIRED HYPOTHYROIDISM: ICD-10-CM

## 2025-06-03 DIAGNOSIS — E11.59 TYPE 2 DIABETES MELLITUS WITH OTHER CIRCULATORY COMPLICATION, WITH LONG-TERM CURRENT USE OF INSULIN: ICD-10-CM

## 2025-06-03 DIAGNOSIS — E78.2 HYPERLIPIDEMIA, MIXED: Primary | ICD-10-CM

## 2025-06-03 DIAGNOSIS — Z79.4 TYPE 2 DIABETES MELLITUS WITH OTHER CIRCULATORY COMPLICATION, WITH LONG-TERM CURRENT USE OF INSULIN: ICD-10-CM

## 2025-06-03 PROCEDURE — 96372 THER/PROPH/DIAG INJ SC/IM: CPT | Performed by: FAMILY MEDICINE

## 2025-06-03 RX ADMIN — CYANOCOBALAMIN 1000 MCG: 1000 INJECTION, SOLUTION INTRAMUSCULAR; SUBCUTANEOUS at 09:51

## 2025-06-04 LAB
ALBUMIN SERPL-MCNC: 3.7 G/DL (ref 3.9–4.9)
ALP SERPL-CCNC: 116 IU/L (ref 44–121)
ALT SERPL-CCNC: 9 IU/L (ref 0–32)
AST SERPL-CCNC: 15 IU/L (ref 0–40)
BASOPHILS # BLD AUTO: 0.1 X10E3/UL (ref 0–0.2)
BASOPHILS NFR BLD AUTO: 1 %
BILIRUB SERPL-MCNC: 0.3 MG/DL (ref 0–1.2)
BUN SERPL-MCNC: 34 MG/DL (ref 8–27)
BUN/CREAT SERPL: 14 (ref 12–28)
CALCIUM SERPL-MCNC: 8.2 MG/DL (ref 8.7–10.3)
CHLORIDE SERPL-SCNC: 109 MMOL/L (ref 96–106)
CHOLEST SERPL-MCNC: 70 MG/DL (ref 100–199)
CK SERPL-CCNC: 58 U/L (ref 32–182)
CO2 SERPL-SCNC: 14 MMOL/L (ref 20–29)
CREAT SERPL-MCNC: 2.42 MG/DL (ref 0.57–1)
EGFRCR SERPLBLD CKD-EPI 2021: 21 ML/MIN/1.73
EOSINOPHIL # BLD AUTO: 0.3 X10E3/UL (ref 0–0.4)
EOSINOPHIL NFR BLD AUTO: 3 %
ERYTHROCYTE [DISTWIDTH] IN BLOOD BY AUTOMATED COUNT: 13 % (ref 11.7–15.4)
GLOBULIN SER CALC-MCNC: 2.6 G/DL (ref 1.5–4.5)
GLUCOSE SERPL-MCNC: 301 MG/DL (ref 70–99)
HBA1C MFR BLD: 8.1 % (ref 4.8–5.6)
HCT VFR BLD AUTO: 32.6 % (ref 34–46.6)
HDLC SERPL-MCNC: 40 MG/DL
HGB BLD-MCNC: 10.2 G/DL (ref 11.1–15.9)
IMM GRANULOCYTES # BLD AUTO: 0 X10E3/UL (ref 0–0.1)
IMM GRANULOCYTES NFR BLD AUTO: 0 %
LDLC SERPL CALC-MCNC: 6 MG/DL (ref 0–99)
LYMPHOCYTES # BLD AUTO: 1.6 X10E3/UL (ref 0.7–3.1)
LYMPHOCYTES NFR BLD AUTO: 15 %
MCH RBC QN AUTO: 32.3 PG (ref 26.6–33)
MCHC RBC AUTO-ENTMCNC: 31.3 G/DL (ref 31.5–35.7)
MCV RBC AUTO: 103 FL (ref 79–97)
MONOCYTES # BLD AUTO: 0.7 X10E3/UL (ref 0.1–0.9)
MONOCYTES NFR BLD AUTO: 6 %
NEUTROPHILS # BLD AUTO: 8.1 X10E3/UL (ref 1.4–7)
NEUTROPHILS NFR BLD AUTO: 75 %
PLATELET # BLD AUTO: 244 X10E3/UL (ref 150–450)
POTASSIUM SERPL-SCNC: 4.7 MMOL/L (ref 3.5–5.2)
PROT SERPL-MCNC: 6.3 G/DL (ref 6–8.5)
RBC # BLD AUTO: 3.16 X10E6/UL (ref 3.77–5.28)
SODIUM SERPL-SCNC: 139 MMOL/L (ref 134–144)
TRIGL SERPL-MCNC: 143 MG/DL (ref 0–149)
TSH SERPL DL<=0.005 MIU/L-ACNC: 2.25 UIU/ML (ref 0.45–4.5)
VLDLC SERPL CALC-MCNC: 24 MG/DL (ref 5–40)
WBC # BLD AUTO: 10.7 X10E3/UL (ref 3.4–10.8)

## 2025-06-13 PROBLEM — D64.9 ANEMIA: Status: ACTIVE | Noted: 2024-09-04

## 2025-06-13 PROBLEM — N18.4 CKD (CHRONIC KIDNEY DISEASE) STAGE 4, GFR 15-29 ML/MIN: Status: ACTIVE | Noted: 2025-06-13

## 2025-06-13 NOTE — PROGRESS NOTES
Patient Name: Shelley Leach  : 1955   MRN: [id number]     Chief Complaint: Stressed out  Chief Complaint   Patient presents with    Hypertension       History of Present Illness: Shelley Leach is a 70 y.o. female who is here today for follow up on blood sugar, kidney function, thyroid and cholesterol.  Patient also having difficulty with stress.  HPI   Patient's daughter is in the hospital and her grandson has any other dialysis     Review of Systems:   Review of Systems   Constitutional: Negative.  Negative for chills, diaphoresis, fatigue and fever.   HENT: Negative.  Negative for congestion and sore throat.    Eyes: Negative.    Respiratory: Negative.  Negative for cough.    Cardiovascular: Negative.  Negative for chest pain.   Gastrointestinal: Negative.  Negative for abdominal pain, nausea and vomiting.   Genitourinary:  Negative for dysuria.   Musculoskeletal:  Negative for myalgias and neck pain.   Skin:  Negative for rash.   Neurological: Negative.  Negative for weakness, numbness and headaches.        Past Medical History:   Past Medical History:   Diagnosis Date    Allergic     Anemia     Anxiety     Arthritis     Bilateral carotid artery stenosis     Cataract 2016    Surgery    Cholelithiasis 1976    Surgery    Clostridioides difficile infection     15 YEARS AGO, TREATED    Colon polyp     Coronary artery disease     Depression     Dietary counseling     diet education-Abstracted from [place]    Dietary counseling and surveillance     Diverticulosis     Elevated cholesterol     GERD (gastroesophageal reflux disease)     Goiter     Heart murmur     History of thyroid disorder     Thyroid problem-Abstracted from [place]    History of transfusion     NO REACTION    Hypertension     Hyperthyroidism     Hypoglycemia associated with type 2 diabetes mellitus 2020    Hypothyroidism     Kidney stone     Leaky heart valve     Obesity      Gastric bypass in 2018    Peptic ulceration 2019    After gastric bypass    PONV (postoperative nausea and vomiting)     Renal insufficiency     Type 2 diabetes mellitus, uncontrolled     Visual impairment 1968    Vitamin D deficiency        Past Surgical History:   Past Surgical History:   Procedure Laterality Date    ADENOIDECTOMY      BARIATRIC SURGERY  10/18    BLEPHAROPLASTY Bilateral     CARDIAC CATHETERIZATION      NO INTERVENTION-20 YRS AGO    CAROTID ARTERY ANGIOPLASTY N/A     CAROTID ENDARTERECTOMY Right 2021    Procedure: CAROTID ENDARTERECTOMY WITH EEG RIGHT;  Surgeon: Javed Morris MD;  Location:  [facility];  Service: Vascular;  Laterality: Right;    CARPAL TUNNEL RELEASE Bilateral     CATARACT EXTRACTION      CHOLECYSTECTOMY      COLONOSCOPY      GASTRIC BYPASS      HERNIA REPAIR  2019    HYSTERECTOMY      INTERVENTIONAL RADIOLOGY PROCEDURE Bilateral 2021    Procedure: Carotid Cerebral Angiogram;  Surgeon: Sukumar Francis MD;  Location:  [facility];  Service: Interventional Radiology;  Laterality: Bilateral;    JOINT REPLACEMENT      Both shoulders    OVARIAN CYST SURGERY      x 2    ROTATOR CUFF REPAIR Left     SUBTOTAL HYSTERECTOMY      THYROIDECTOMY, PARTIAL      TONSILLECTOMY         Family History:   Family History   Problem Relation Age of Onset    Diabetes Mother             Anemia Mother     Cancer Mother             Hypertension Mother             Anxiety disorder Mother             Arthritis Mother             Depression Mother             Hearing loss Mother             Miscarriages / Stillbirths Mother         -stillborn twins    Stroke Mother          from massive stroke 2023    Vision loss Mother             Diabetes Father             Hypertension Father             Thyroid disease Father             Kidney disease Father              Cancer Father         Decreased    Obesity Father     Arthritis Father             Hearing loss Father             Heart disease Father             Kidney disease Maternal Grandfather             Hypertension Maternal Grandmother             Obesity Paternal Grandmother     Cancer Maternal Aunt             Cancer Paternal Aunt         Breast    Diabetes Paternal Aunt     Cancer Maternal Uncle         Bladder    Diabetes Maternal Uncle     Hypertension Maternal Uncle     Kidney disease Maternal Uncle     Obesity Maternal Uncle     Cancer Maternal Aunt         Breast-    Diabetes Maternal Aunt             Diabetes Brother             Hypertension Brother             Kidney disease Brother             Obesity Brother     Cancer Brother             Thyroid disease Brother             Diabetes Maternal Uncle             Diabetes Maternal Aunt             Early death Maternal Aunt             Asthma Brother         Decreased       Social History:   Social History     Socioeconomic History    Marital status:     Number of children: 2   Tobacco Use    Smoking status: Never     Passive exposure: Never    Smokeless tobacco: Never    Tobacco comments:     Father smoked until i was 8   Vaping Use    Vaping status: Never Used   Substance and Sexual Activity    Alcohol use: Never    Drug use: Never    Sexual activity: Yes     Partners: Male     Birth control/protection: Vasectomy, Hysterectomy       Medications:     Current Outpatient Medications:     alendronate (Fosamax) 70 MG tablet, Take 1 tablet by mouth Every 7 (Seven) Days., Disp: 12 tablet, Rfl: 1    amLODIPine (NORVASC) 5 MG tablet, Take 1 tablet by mouth Daily., Disp: 90 tablet, Rfl: 3    aspirin 81 MG EC tablet, Take 1 tablet by mouth Daily., Disp: , Rfl:     atorvastatin (LIPITOR) 10 MG tablet, Take 1 tablet by mouth Every  "Night., Disp: 90 tablet, Rfl: 3    Biotin 22279 MCG tablet dispersible, Place 10,000 mcg on the tongue 2 (two) times a day., Disp: , Rfl:     Blood Glucose Monitoring Suppl (Accu-Chek Guide) w/Device kit, Use 1 kit See Admin Instructions. Use to check blood sugar 4 times daily.  Dx E11.649, on insulin, Disp: 1 kit, Rfl: 0    buPROPion XL (WELLBUTRIN XL) 300 MG 24 hr tablet, Take 1 tablet by mouth Daily., Disp: 90 tablet, Rfl: 3    cetirizine (zyrTEC) 10 MG tablet, Take 1 tablet by mouth Daily., Disp: 90 tablet, Rfl: 10    clopidogrel (PLAVIX) 75 MG tablet, Take 1 tablet by mouth Daily., Disp: 90 tablet, Rfl: 3    Cyanocobalamin (B-12 Compliance Injection) 1000 MCG/ML kit, Inject  as directed., Disp: , Rfl:     estradiol (ESTRACE) 0.1 MG/GM vaginal cream, INSERT 1G VAGINALY TWICE A WEEK AT BEDTIME, Disp: , Rfl:     famotidine (PEPCID) 40 MG tablet, , Disp: , Rfl:     Farxiga 5 MG tablet tablet, Take 1 tablet by mouth Daily., Disp: 30 tablet, Rfl: 5    gabapentin (NEURONTIN) 300 MG capsule, Take 1 capsule by mouth 3 (Three) Times a Day., Disp: 270 capsule, Rfl: 1    Glucagon (Gvoke HypoPen 2-Pack) 1 MG/0.2ML solution auto-injector, Inject 1 dose under the skin into the appropriate area as directed As Needed (for hypoglycemia)., Disp: 1 mL, Rfl: 3    glucose blood (Accu-Chek Guide) test strip, Check blood sugar 3 times daily dx e11.65 on insulin pump, Disp: 300 each, Rfl: 3    Insulin Glargine (LANTUS SOLOSTAR) 100 UNIT/ML injection pen, Inject 10 Units under the skin into the appropriate area as directed Every Night., Disp: 15 mL, Rfl: 3    Insulin Pen Needle (BD Pen Needle Melba U/F) 32G X 4 MM misc, Use 1 each Daily., Disp: 100 each, Rfl: 3    Insulin Syringe-Needle U-100 30G X 5/16\" 1 ML misc, Use 1 each 3 times a day. Dx code e11.65, Disp: 300 each, Rfl: 3    Lancets 33G misc, Use 1 each 3 (Three) Times a Day. Dx e11.65 on insulin pump, Disp: 300 each, Rfl: 3    levothyroxine (Synthroid) 112 MCG tablet, Take 1 " tablet by mouth Daily., Disp: 90 tablet, Rfl: 0    LORazepam (ATIVAN) 1 MG tablet, Take 1 tablet by mouth Every 12 (Twelve) Hours As Needed for Anxiety., Disp: 45 tablet, Rfl: 2    losartan (COZAAR) 100 MG tablet, Take 1 tablet by mouth Daily., Disp: 90 tablet, Rfl: 3    meclizine (ANTIVERT) 25 MG tablet, Take 1 tablet by mouth 3 (Three) Times a Day As Needed for Dizziness., Disp: 60 tablet, Rfl: 1    Mirabegron ER (Myrbetriq) 25 MG tablet sustained-release 24 hour 24 hr tablet, Take 1 tablet by mouth Daily., Disp: 90 tablet, Rfl: 0    multivitamin (THERAGRAN) tablet tablet, take 1 tablet by oral route  every day with food, Disp: , Rfl:     Probiotic Product (TRUBIOTICS PO), Take 1 capsule by mouth Daily., Disp: , Rfl:     Repatha solution prefilled syringe injection, INJECT 1 ML UNDER THE SKIN INTO THE APPROPRIATE AREA AS DIRECTED EVERY 14 (FOURTEEN) DAYS., Disp: 6 mL, Rfl: 3    traMADol (ULTRAM) 50 MG tablet, Take 1 tablet by mouth Daily As Needed for Moderate Pain., Disp: 10 tablet, Rfl: 2    Current Facility-Administered Medications:     cyanocobalamin injection 1,000 mcg, 1,000 mcg, Intramuscular, Q28 Days, Tom Salas MD, 1,000 mcg at 03/30/23 1015    cyanocobalamin injection 1,000 mcg, 1,000 mcg, Intramuscular, Q28 Days, Tom Salas MD, 1,000 mcg at 05/08/25 1506    cyanocobalamin injection 1,000 mcg, 1,000 mcg, Intramuscular, Q28 Days, Tom Salas MD, 1,000 mcg at 12/28/23 1033    cyanocobalamin injection 1,000 mcg, 1,000 mcg, Intramuscular, Q28 Days, Tom Salas MD, 1,000 mcg at 06/03/25 0951    Facility-Administered Medications Ordered in Other Visits:     Chlorhexidine Gluconate Cloth 2 % pads 1 application, 1 application , Topical, Q12H PRN, Sarah Wan PA-C    Chlorhexidine Gluconate Cloth 2 % pads 1 application, 1 application , Topical, Q12H PRN, Jeffery Sumner, PA    Allergies:   Allergies   Allergen Reactions    Trimethoprim Swelling    Percocet  "[Oxycodone-Acetaminophen] Itching    Morphine Other (See Comments)     HYPOTENSION    Sulfamethoxazole-Trimethoprim Swelling    Penicillins Rash         Physical Exam:  Vital Signs:   Vitals:    06/16/25 1121   BP: 112/60   BP Location: Left arm   Patient Position: Sitting   Cuff Size: Adult   Pulse: 64   SpO2: 97%   Weight: 61.4 kg (135 lb 6.4 oz)   Height: 152.4 cm (60\")     Body mass index is 26.44 kg/m².     Physical Exam  Vitals and nursing note reviewed.   Constitutional:       Appearance: Normal appearance. She is normal weight.   HENT:      Head: Normocephalic and atraumatic.      Right Ear: Tympanic membrane, ear canal and external ear normal.      Left Ear: Tympanic membrane, ear canal and external ear normal.      Nose: Nose normal.      Mouth/Throat:      Mouth: Mucous membranes are dry.      Pharynx: Oropharynx is clear.   Eyes:      Extraocular Movements: Extraocular movements intact.      Conjunctiva/sclera: Conjunctivae normal.      Pupils: Pupils are equal, round, and reactive to light.   Cardiovascular:      Rate and Rhythm: Normal rate and regular rhythm.      Pulses: Normal pulses.      Heart sounds: Normal heart sounds.   Pulmonary:      Effort: Pulmonary effort is normal.      Breath sounds: Normal breath sounds.   Musculoskeletal:      Cervical back: Normal range of motion and neck supple.   Feet:      Comments:      Neurological:      Mental Status: She is alert.         Procedures      Assessment/Plan:   Diagnoses and all orders for this visit:    1. Anemia, unspecified type (Primary)  Assessment & Plan:  We will recheck anemia studies.    Orders:  -     Ferritin; Future  -     Folate; Future  -     Iron Profile w/o Ferritin; Future  -     Vitamin B12; Future  -     Hemoglobin A1c; Future  -     Lipid Panel; Future  -     Comprehensive Metabolic Panel; Future  -     Vitamin B12; Future  -     Vitamin D,25-Hydroxy; Future  -     TSH Rfx On Abnormal To Free T4; Future  -     CBC & " Differential; Future    2. CKD (chronic kidney disease) stage 4, GFR 15-29 ml/min  Assessment & Plan:  Renal ultrasound, SPEP, UPEP, we will send to nephrology.    Orders:  -     Ambulatory Referral to Nephrology  -     Protein Elec + Interp, Serum; Future  -     Protein Electrophoresis, 24 Hr Urine - Urine, Clean Catch; Future  -     Hemoglobin A1c; Future  -     Lipid Panel; Future  -     Comprehensive Metabolic Panel; Future  -     Vitamin B12; Future  -     Vitamin D,25-Hydroxy; Future  -     TSH Rfx On Abnormal To Free T4; Future  -     CBC & Differential; Future    3. Uncontrolled type 2 diabetes mellitus with hyperglycemia  Assessment & Plan:  A1 C is 8.1.  Recheck in 3 months.    Orders:  -     Hemoglobin A1c; Future  -     Lipid Panel; Future  -     Comprehensive Metabolic Panel; Future  -     Vitamin B12; Future  -     Vitamin D,25-Hydroxy; Future  -     TSH Rfx On Abnormal To Free T4; Future  -     CBC & Differential; Future    4. Hyperlipidemia, mixed  Assessment & Plan:  HDL 40.  LDL 6.  Recheck in 3 months.    Orders:  -     atorvastatin (LIPITOR) 10 MG tablet; Take 1 tablet by mouth Every Night.  Dispense: 90 tablet; Refill: 3  -     Hemoglobin A1c; Future  -     Lipid Panel; Future  -     Comprehensive Metabolic Panel; Future  -     Vitamin B12; Future  -     Vitamin D,25-Hydroxy; Future  -     TSH Rfx On Abnormal To Free T4; Future  -     CBC & Differential; Future    5. Hypertension, essential  Assessment & Plan:  Discussed with patient to monitor their blood pressure and if systolic blood pressure goes above 140 or diastolic is above 90 to return to clinic.  Take medicines as directed, call for any problems, patient not having or any worrisome symptoms.        SmartLink not supported outside of the Encounter Diagnoses SmartSection.      Orders:  -     Hemoglobin A1c; Future  -     Lipid Panel; Future  -     Comprehensive Metabolic Panel; Future  -     Vitamin B12; Future  -     Vitamin  D,25-Hydroxy; Future  -     TSH Rfx On Abnormal To Free T4; Future  -     CBC & Differential; Future    6. Acquired hypothyroidism  Assessment & Plan:  Followed by Dr. Talbot    Orders:  -     Hemoglobin A1c; Future  -     Lipid Panel; Future  -     Comprehensive Metabolic Panel; Future  -     Vitamin B12; Future  -     Vitamin D,25-Hydroxy; Future  -     TSH Rfx On Abnormal To Free T4; Future  -     CBC & Differential; Future    7. Adjustment disorder with other symptom  Assessment & Plan:  Patient's daughter is in the hospital.  Her grandson is going to [facility] for drugs.  I am going to have her take a half an Ativan in the morning at home 1 at nighttime her daughter gets out of the hospital.  Continue her other medicines and recheck in 3 months.    Orders:  -     buPROPion XL (WELLBUTRIN XL) 300 MG 24 hr tablet; Take 1 tablet by mouth Daily.  Dispense: 90 tablet; Refill: 3  -     Hemoglobin A1c; Future  -     Lipid Panel; Future  -     Comprehensive Metabolic Panel; Future  -     Vitamin B12; Future  -     Vitamin D,25-Hydroxy; Future  -     TSH Rfx On Abnormal To Free T4; Future  -     CBC & Differential; Future    8. Neuropathy  Assessment & Plan:  Gabapentin.    Orders:  -     gabapentin (NEURONTIN) 300 MG capsule; Take 1 capsule by mouth 3 (Three) Times a Day.  Dispense: 270 capsule; Refill: 1  -     Hemoglobin A1c; Future  -     Lipid Panel; Future  -     Comprehensive Metabolic Panel; Future  -     Vitamin B12; Future  -     Vitamin D,25-Hydroxy; Future  -     TSH Rfx On Abnormal To Free T4; Future  -     CBC & Differential; Future    9. Hypoglycemia associated with type 2 diabetes mellitus  -     Insulin Glargine (LANTUS SOLOSTAR) 100 UNIT/ML injection pen; Inject 10 Units under the skin into the appropriate area as directed Every Night.  Dispense: 15 mL; Refill: 3  -     Hemoglobin A1c; Future  -     Lipid Panel; Future  -     Comprehensive Metabolic Panel; Future  -     Vitamin B12; Future  -      Vitamin D,25-Hydroxy; Future  -     TSH Rfx On Abnormal To Free T4; Future  -     CBC & Differential; Future    10. Anxiety  Assessment & Plan:  Ativan as needed    Orders:  -     LORazepam (ATIVAN) 1 MG tablet; Take 1 tablet by mouth Every 12 (Twelve) Hours As Needed for Anxiety.  Dispense: 45 tablet; Refill: 2  -     Hemoglobin A1c; Future  -     Lipid Panel; Future  -     Comprehensive Metabolic Panel; Future  -     Vitamin B12; Future  -     Vitamin D,25-Hydroxy; Future  -     TSH Rfx On Abnormal To Free T4; Future  -     CBC & Differential; Future    11. Osteoarthritis of multiple joints, unspecified osteoarthritis type  Assessment & Plan:  Tramadol as needed    Orders:  -     traMADol (ULTRAM) 50 MG tablet; Take 1 tablet by mouth Daily As Needed for Moderate Pain.  Dispense: 10 tablet; Refill: 2  -     Hemoglobin A1c; Future  -     Lipid Panel; Future  -     Comprehensive Metabolic Panel; Future  -     Vitamin B12; Future  -     Vitamin D,25-Hydroxy; Future  -     TSH Rfx On Abnormal To Free T4; Future  -     CBC & Differential; Future    Other orders  -     amLODIPine (NORVASC) 5 MG tablet; Take 1 tablet by mouth Daily.  Dispense: 90 tablet; Refill: 3  -     clopidogrel (PLAVIX) 75 MG tablet; Take 1 tablet by mouth Daily.  Dispense: 90 tablet; Refill: 3  -     levothyroxine (Synthroid) 112 MCG tablet; Take 1 tablet by mouth Daily.  Dispense: 90 tablet; Refill: 0  -     losartan (COZAAR) 100 MG tablet; Take 1 tablet by mouth Daily.  Dispense: 90 tablet; Refill: 3  -     Mirabegron ER (Myrbetriq) 25 MG tablet sustained-release 24 hour 24 hr tablet; Take 1 tablet by mouth Daily.  Dispense: 90 tablet; Refill: 0             Follow Up:   Return in about 3 months (around 9/16/2025) for Annual physical, Bloodwork 1 week prior to next appointment.      Tom Salas MD  [facility]

## 2025-06-16 ENCOUNTER — OFFICE VISIT (OUTPATIENT)
Dept: FAMILY MEDICINE CLINIC | Facility: CLINIC | Age: 70
End: 2025-06-16
Payer: MEDICARE

## 2025-06-16 VITALS
WEIGHT: 135.4 LBS | HEART RATE: 64 BPM | HEIGHT: 60 IN | DIASTOLIC BLOOD PRESSURE: 60 MMHG | BODY MASS INDEX: 26.58 KG/M2 | SYSTOLIC BLOOD PRESSURE: 112 MMHG | OXYGEN SATURATION: 97 %

## 2025-06-16 DIAGNOSIS — E11.649 HYPOGLYCEMIA ASSOCIATED WITH TYPE 2 DIABETES MELLITUS: ICD-10-CM

## 2025-06-16 DIAGNOSIS — E03.9 ACQUIRED HYPOTHYROIDISM: ICD-10-CM

## 2025-06-16 DIAGNOSIS — E78.2 HYPERLIPIDEMIA, MIXED: ICD-10-CM

## 2025-06-16 DIAGNOSIS — D64.9 ANEMIA, UNSPECIFIED TYPE: Primary | ICD-10-CM

## 2025-06-16 DIAGNOSIS — F41.9 ANXIETY: ICD-10-CM

## 2025-06-16 DIAGNOSIS — E11.65 UNCONTROLLED TYPE 2 DIABETES MELLITUS WITH HYPERGLYCEMIA: ICD-10-CM

## 2025-06-16 DIAGNOSIS — M15.9 OSTEOARTHRITIS OF MULTIPLE JOINTS, UNSPECIFIED OSTEOARTHRITIS TYPE: ICD-10-CM

## 2025-06-16 DIAGNOSIS — G62.9 NEUROPATHY: ICD-10-CM

## 2025-06-16 DIAGNOSIS — F43.29 ADJUSTMENT DISORDER WITH OTHER SYMPTOM: ICD-10-CM

## 2025-06-16 DIAGNOSIS — I10 HYPERTENSION, ESSENTIAL: ICD-10-CM

## 2025-06-16 DIAGNOSIS — N18.4 CKD (CHRONIC KIDNEY DISEASE) STAGE 4, GFR 15-29 ML/MIN: ICD-10-CM

## 2025-06-16 PROCEDURE — 1126F AMNT PAIN NOTED NONE PRSNT: CPT | Performed by: FAMILY MEDICINE

## 2025-06-16 PROCEDURE — 3052F HG A1C>EQUAL 8.0%<EQUAL 9.0%: CPT | Performed by: FAMILY MEDICINE

## 2025-06-16 PROCEDURE — 3078F DIAST BP <80 MM HG: CPT | Performed by: FAMILY MEDICINE

## 2025-06-16 PROCEDURE — 3074F SYST BP LT 130 MM HG: CPT | Performed by: FAMILY MEDICINE

## 2025-06-16 PROCEDURE — 99214 OFFICE O/P EST MOD 30 MIN: CPT | Performed by: FAMILY MEDICINE

## 2025-06-16 PROCEDURE — G2211 COMPLEX E/M VISIT ADD ON: HCPCS | Performed by: FAMILY MEDICINE

## 2025-06-16 RX ORDER — LEVOTHYROXINE SODIUM 112 UG/1
112 TABLET ORAL DAILY
Qty: 90 TABLET | Refills: 0 | Status: SHIPPED | OUTPATIENT
Start: 2025-06-16 | End: 2026-06-16

## 2025-06-16 RX ORDER — GABAPENTIN 300 MG/1
300 CAPSULE ORAL 3 TIMES DAILY
Qty: 270 CAPSULE | Refills: 1 | Status: SHIPPED | OUTPATIENT
Start: 2025-06-16

## 2025-06-16 RX ORDER — MIRABEGRON 25 MG/1
25 TABLET, FILM COATED, EXTENDED RELEASE ORAL DAILY
Qty: 90 TABLET | Refills: 0 | Status: SHIPPED | OUTPATIENT
Start: 2025-06-16

## 2025-06-16 RX ORDER — AMLODIPINE BESYLATE 5 MG/1
5 TABLET ORAL DAILY
Qty: 90 TABLET | Refills: 3 | Status: SHIPPED | OUTPATIENT
Start: 2025-06-16

## 2025-06-16 RX ORDER — LORAZEPAM 1 MG/1
1 TABLET ORAL EVERY 12 HOURS PRN
Qty: 45 TABLET | Refills: 2 | Status: SHIPPED | OUTPATIENT
Start: 2025-06-16

## 2025-06-16 RX ORDER — ATORVASTATIN CALCIUM 10 MG/1
10 TABLET, FILM COATED ORAL NIGHTLY
Qty: 90 TABLET | Refills: 3 | Status: SHIPPED | OUTPATIENT
Start: 2025-06-16

## 2025-06-16 RX ORDER — CLOPIDOGREL BISULFATE 75 MG/1
75 TABLET ORAL DAILY
Qty: 90 TABLET | Refills: 3 | Status: SHIPPED | OUTPATIENT
Start: 2025-06-16

## 2025-06-16 RX ORDER — TRAMADOL HYDROCHLORIDE 50 MG/1
50 TABLET ORAL DAILY PRN
Qty: 10 TABLET | Refills: 2 | Status: SHIPPED | OUTPATIENT
Start: 2025-06-16

## 2025-06-16 RX ORDER — BUPROPION HYDROCHLORIDE 300 MG/1
300 TABLET ORAL DAILY
Qty: 90 TABLET | Refills: 3 | Status: SHIPPED | OUTPATIENT
Start: 2025-06-16

## 2025-06-16 RX ORDER — LOSARTAN POTASSIUM 100 MG/1
100 TABLET ORAL DAILY
Qty: 90 TABLET | Refills: 3 | Status: SHIPPED | OUTPATIENT
Start: 2025-06-16

## 2025-06-16 RX ORDER — ALENDRONATE SODIUM 70 MG/1
70 TABLET ORAL
Qty: 12 TABLET | Refills: 1 | Status: CANCELLED | OUTPATIENT
Start: 2025-06-16

## 2025-06-16 NOTE — ASSESSMENT & PLAN NOTE
Patient's daughter is in the hospital.  Her grandson is going to [facility] for drugs.  I am going to have her take a half an Ativan in the morning at home 1 at nighttime her daughter gets out of the hospital.  Continue her other medicines and recheck in 3 months.

## 2025-06-17 LAB
ALBUMIN SERPL ELPH-MCNC: 3.6 G/DL (ref 2.9–4.4)
ALBUMIN/GLOB SERPL: 1.1 {RATIO} (ref 0.7–1.7)
ALPHA1 GLOB SERPL ELPH-MCNC: 0.3 G/DL (ref 0–0.4)
ALPHA2 GLOB SERPL ELPH-MCNC: 0.9 G/DL (ref 0.4–1)
B-GLOBULIN SERPL ELPH-MCNC: 0.8 G/DL (ref 0.7–1.3)
FERRITIN SERPL-MCNC: 349 NG/ML (ref 15–150)
FOLATE SERPL-MCNC: 17.4 NG/ML
GAMMA GLOB SERPL ELPH-MCNC: 1.3 G/DL (ref 0.4–1.8)
GLOBULIN SER CALC-MCNC: 3.2 G/DL (ref 2.2–3.9)
IRON SATN MFR SERPL: 27 % (ref 15–55)
IRON SERPL-MCNC: 66 UG/DL (ref 27–139)
LABORATORY COMMENT REPORT: NORMAL
M PROTEIN SERPL ELPH-MCNC: NORMAL G/DL
PROT PATTERN SERPL ELPH-IMP: NORMAL
PROT SERPL-MCNC: 6.8 G/DL (ref 6–8.5)
TIBC SERPL-MCNC: 247 UG/DL (ref 250–450)
UIBC SERPL-MCNC: 181 UG/DL (ref 118–369)
VIT B12 SERPL-MCNC: 1719 PG/ML (ref 232–1245)

## 2025-06-19 ENCOUNTER — RESULTS FOLLOW-UP (OUTPATIENT)
Dept: FAMILY MEDICINE CLINIC | Facility: CLINIC | Age: 70
End: 2025-06-19
Payer: MEDICARE

## 2025-06-19 DIAGNOSIS — D80.8 LIGHT CHAIN DISEASE: Primary | ICD-10-CM

## 2025-06-19 NOTE — LETTER
Alberta Elana Leach  [address] [place] [address]    June 19, 2025     Dear Ms. Leach:    Below are the results from your recent visit:    Resulted Orders   Vitamin B12   Result Value Ref Range    Vitamin B-12 1,719 (H) 232 - 1,245 pg/mL   Iron Profile w/o Ferritin   Result Value Ref Range    TIBC 247 (L) 250 - 450 ug/dL    UIBC 181 118 - 369 ug/dL    Iron 66 27 - 139 ug/dL    Iron Saturation 27 15 - 55 %   Folate   Result Value Ref Range    Folate 17.4 >3.0 ng/mL      Comment:      A serum folate concentration of less than 3.1 ng/mL is  considered to represent clinical deficiency.     Ferritin   Result Value Ref Range    Ferritin 349 (H) 15 - 150 ng/mL   Protein Elec + Interp, Serum   Result Value Ref Range    Total Protein 6.8 6.0 - 8.5 g/dL    Albumin 3.6 2.9 - 4.4 g/dL    Alpha-1-Globulin 0.3 0.0 - 0.4 g/dL    Alpha-2-Globulin 0.9 0.4 - 1.0 g/dL    Beta Globulin 0.8 0.7 - 1.3 g/dL    Gamma Globulin 1.3 0.4 - 1.8 g/dL    M-Severinao Comment: Not Observed g/dL      Comment:      ASYMMETRICAL GAMMA    Globulin 3.2 2.2 - 3.9 g/dL    A/G Ratio 1.1 0.7 - 1.7    Please note Comment       Comment:      Protein electrophoresis scan will follow via computer, mail, or   delivery.      SPE Interpretation Comment       Comment:      Faint band in gamma region suspicious for monoclonal immunoglobulin.  This band may represent a benign spike as seen in older people or  could be a paraprotein as seen in Multiple Myeloma, Waldenstrom's  Macroglobulinemia or Lymphoma. Depending on clinical circumstances,  further diagnostic studies may include serum immunofixation or serum  free light chain quantitation.     Protein Electrophoresis, Random Urine - Urine, Clean Catch   Result Value Ref Range    Total Protein, Urine 76.4 Not Estab. mg/dL    Albumin, U 57.9 %    Alpha-1-Globulin, U 1.5 %    Alpha-2-Globulin, U 5.0 %    Beta Globulin, U 13.2 %    Gamma Globulin, Urine 22.3 %    M-Severiano Comment: Not Observed %       Comment:      UPE shows asymmetrical gamma. Suggest urine CAITLIN, Serum PE/CAITLIN,  and free light chain analysis for further evaluation.      Please note Comment       Comment:      Protein electrophoresis scan will follow via computer, mail, or   delivery.       Blood work is stable, cont routine appointments       If you have any questions or concerns, please don't hesitate to call.         Sincerely,        Tom Salas MD

## 2025-06-20 LAB
ALBUMIN MFR UR ELPH: 58.9 %
ALPHA1 GLOB MFR UR ELPH: 1.3 %
ALPHA2 GLOB MFR UR ELPH: 5.5 %
B-GLOBULIN MFR UR ELPH: 12.3 %
GAMMA GLOB MFR UR ELPH: 22 %
LABORATORY COMMENT REPORT: ABNORMAL
M PROTEIN MFR UR ELPH: 5 %
PROT UR-MCNC: 64.8 MG/DL

## 2025-06-22 DIAGNOSIS — K21.9 GASTROESOPHAGEAL REFLUX DISEASE WITHOUT ESOPHAGITIS: Primary | ICD-10-CM

## 2025-06-23 RX ORDER — FAMOTIDINE 40 MG/1
40 TABLET, FILM COATED ORAL DAILY
Qty: 90 TABLET | Refills: 1 | Status: SHIPPED | OUTPATIENT
Start: 2025-06-23

## 2025-06-25 ENCOUNTER — OFFICE VISIT (OUTPATIENT)
Age: 70
End: 2025-06-25
Payer: MEDICARE

## 2025-06-25 VITALS
HEART RATE: 70 BPM | BODY MASS INDEX: 26.15 KG/M2 | HEIGHT: 60 IN | WEIGHT: 133.2 LBS | DIASTOLIC BLOOD PRESSURE: 78 MMHG | OXYGEN SATURATION: 98 % | SYSTOLIC BLOOD PRESSURE: 130 MMHG

## 2025-06-25 DIAGNOSIS — E11.65 UNCONTROLLED TYPE 2 DIABETES MELLITUS WITH HYPERGLYCEMIA: Primary | ICD-10-CM

## 2025-06-25 PROCEDURE — 1160F RVW MEDS BY RX/DR IN RCRD: CPT | Performed by: INTERNAL MEDICINE

## 2025-06-25 PROCEDURE — 99213 OFFICE O/P EST LOW 20 MIN: CPT | Performed by: INTERNAL MEDICINE

## 2025-06-25 PROCEDURE — 1159F MED LIST DOCD IN RCRD: CPT | Performed by: INTERNAL MEDICINE

## 2025-06-25 PROCEDURE — 3075F SYST BP GE 130 - 139MM HG: CPT | Performed by: INTERNAL MEDICINE

## 2025-06-25 PROCEDURE — 3078F DIAST BP <80 MM HG: CPT | Performed by: INTERNAL MEDICINE

## 2025-06-25 PROCEDURE — 3052F HG A1C>EQUAL 8.0%<EQUAL 9.0%: CPT | Performed by: INTERNAL MEDICINE

## 2025-06-25 NOTE — PROGRESS NOTES
"     Office Note      Date: 2025  Patient Name: Shelley Leach  MRN: [id number]  : 1955    Chief Complaint   Patient presents with    Hypoglycemia associated with type 2 diabetes mellitus       History of Present Illness:   Shelley Leach is a 70 y.o. female who presents for Diabetes type 2.   Current RX daily lantus / farxgia     Bg checks are done: with cgm . Fasting sugars are good. They go up with meals   Hypoglycemia :rare       Last A1c:  Hemoglobin A1C   Date Value Ref Range Status   2025 8.1 (H) 4.8 - 5.6 % Final     Comment:              Prediabetes: 5.7 - 6.4           Diabetes: >6.4           Glycemic control for adults with diabetes: <7.0     2024 7.4 (A) 4.5 - 5.7 % Final   2021 10.40 (H) 4.80 - 5.60 % Final       Changes in health since last visit: we stopped the meal time insulin last time because even just 2 units would cause  post prandial hypoglycemia. .. Now we see post prandial hyperglycemia. She did have a pump but stopped using it because when she got a replacment pump she programed it incorrectly and went very low .     Subjective         Review of Systems:   Review of Systems   Endocrine: Negative for polydipsia and polyuria.       The following portions of the patient's history were reviewed and updated as appropriate: allergies, current medications, past family history, past medical history, past social history, past surgical history, and problem list.    Objective     Visit Vitals  /78 (BP Location: Right arm, Patient Position: Sitting)   Pulse 70   Ht 152.4 cm (60\")   Wt 60.4 kg (133 lb 3.2 oz)   SpO2 98%   BMI 26.01 kg/m²           Physical Exam:  Physical Exam  Vitals reviewed.   Constitutional:       Appearance: Normal appearance.   Neurological:      Mental Status: She is alert.   Psychiatric:         Mood and Affect: Mood normal.         Behavior: Behavior normal.         Thought Content: Thought content normal.         Judgment: " Judgment normal.          Assessment / Plan      Assessment & Plan:  Problem List Items Addressed This Visit       Uncontrolled type 2 diabetes mellitus with hyperglycemia - Primary    Current Assessment & Plan   Eyes' kidneys and feet checks are up to date    A1c of 8.1 is acceptable.  We cannot increase farxiga due to ckd. Meal time insulin caused hypos.. this is the best we can do with what we have          Relevant Medications    Glucagon (Gvoke HypoPen 2-Pack) 1 MG/0.2ML solution auto-injector    Farxiga 5 MG tablet tablet    Insulin Glargine (LANTUS SOLOSTAR) 100 UNIT/ML injection pen         Electronically signed by : Sonu Talbot MD  06/25/2025

## 2025-06-25 NOTE — ASSESSMENT & PLAN NOTE
Eyes' kidneys and feet checks are up to date    A1c of 8.1 is acceptable.  We cannot increase farxiga due to ckd. Meal time insulin caused hypos.. this is the best we can do with what we have

## 2025-07-09 DIAGNOSIS — F41.9 ANXIETY: ICD-10-CM

## 2025-07-09 DIAGNOSIS — G62.9 NEUROPATHY: ICD-10-CM

## 2025-07-09 RX ORDER — CETIRIZINE HYDROCHLORIDE 10 MG/1
10 TABLET ORAL DAILY
Qty: 90 TABLET | Refills: 10 | OUTPATIENT
Start: 2025-07-09

## 2025-07-09 RX ORDER — GABAPENTIN 300 MG/1
300 CAPSULE ORAL 3 TIMES DAILY
Qty: 270 CAPSULE | Refills: 1 | OUTPATIENT
Start: 2025-07-09

## 2025-07-09 RX ORDER — LORAZEPAM 1 MG/1
1 TABLET ORAL EVERY 12 HOURS PRN
Qty: 45 TABLET | Refills: 2 | OUTPATIENT
Start: 2025-07-09

## 2025-07-10 ENCOUNTER — CLINICAL SUPPORT (OUTPATIENT)
Dept: FAMILY MEDICINE CLINIC | Facility: CLINIC | Age: 70
End: 2025-07-10
Payer: MEDICARE

## 2025-07-10 DIAGNOSIS — E53.8 B12 DEFICIENCY: Primary | ICD-10-CM

## 2025-07-10 PROCEDURE — 96372 THER/PROPH/DIAG INJ SC/IM: CPT | Performed by: FAMILY MEDICINE

## 2025-07-10 RX ORDER — CETIRIZINE HYDROCHLORIDE 10 MG/1
10 TABLET ORAL DAILY
Qty: 90 TABLET | Refills: 10 | Status: SHIPPED | OUTPATIENT
Start: 2025-07-10

## 2025-07-10 RX ADMIN — CYANOCOBALAMIN 1000 MCG: 1000 INJECTION, SOLUTION INTRAMUSCULAR; SUBCUTANEOUS at 11:12

## 2025-07-29 RX ORDER — EVOLOCUMAB 140 MG/ML
140 INJECTION, SOLUTION SUBCUTANEOUS
Qty: 6 ML | Refills: 0 | Status: SHIPPED | OUTPATIENT
Start: 2025-07-29

## 2025-08-01 RX ORDER — EVOLOCUMAB 140 MG/ML
140 INJECTION, SOLUTION SUBCUTANEOUS
Qty: 6 ML | Refills: 0 | OUTPATIENT
Start: 2025-08-01

## 2025-08-06 ENCOUNTER — RESULTS FOLLOW-UP (OUTPATIENT)
Age: 70
End: 2025-08-06
Payer: MEDICARE

## 2025-08-11 RX ORDER — ALENDRONATE SODIUM 70 MG/1
70 TABLET ORAL WEEKLY
Qty: 12 TABLET | Refills: 0 | Status: SHIPPED | OUTPATIENT
Start: 2025-08-11

## 2025-08-28 RX ORDER — EVOLOCUMAB 140 MG/ML
140 INJECTION, SOLUTION SUBCUTANEOUS
Qty: 6 ML | Refills: 0 | Status: SHIPPED | OUTPATIENT
Start: 2025-08-28

## (undated) DEVICE — DECANTER BAG 9": Brand: MEDLINE INDUSTRIES, INC.

## (undated) DEVICE — GLIDESHEATH SLENDER STAINLESS STEEL KIT: Brand: GLIDESHEATH SLENDER

## (undated) DEVICE — STERILE PVP: Brand: MEDLINE INDUSTRIES, INC.

## (undated) DEVICE — ANTIBACTERIAL UNDYED BRAIDED (POLYGLACTIN 910), SYNTHETIC ABSORBABLE SUTURE: Brand: COATED VICRYL

## (undated) DEVICE — SYR CONTRL LUERLOK 10CC

## (undated) DEVICE — GLV SURG BIOGEL LTX PF 8

## (undated) DEVICE — 1 ML TUBERCULIN SYRINGE REGULAR TIP: Brand: MONOJECT

## (undated) DEVICE — 3M™ STERI-DRAPE™ INSTRUMENT POUCH 1018: Brand: STERI-DRAPE™

## (undated) DEVICE — RADIFOCUS TORQUE DEVICE MULTI-TORQUE VISE: Brand: RADIFOCUS TORQUE DEVICE

## (undated) DEVICE — LIMB HOLDER, WRIST/ANKLE: Brand: DEROYAL

## (undated) DEVICE — SUCTION CANISTER 2500CC: Brand: DEROYAL

## (undated) DEVICE — SUT PROLN 6/0 C1 D/A 30IN 8706H

## (undated) DEVICE — JACKSON-PRATT 100CC BULB RESERVOIR: Brand: CARDINAL HEALTH

## (undated) DEVICE — SUT SILK 2/0 TIES 18IN A185H

## (undated) DEVICE — CATH ANGIO SIM2 HNB5.0 .038 100 P NS

## (undated) DEVICE — Device: Brand: JELCO

## (undated) DEVICE — ELECTRD BLD EZ CLN STD 2.5IN

## (undated) DEVICE — TBG PENCL TELESCP MEGADYNE SMOKE EVAC 10FT

## (undated) DEVICE — PATIENT RETURN ELECTRODE, SINGLE-USE, CONTACT QUALITY MONITORING, ADULT, WITH 9FT CORD, FOR PATIENTS WEIGING OVER 33LBS. (15KG): Brand: MEGADYNE

## (undated) DEVICE — PENCL ROCKRSWCH MEGADYNE W/HOLSTR 10FT SS

## (undated) DEVICE — DRAIN JACKSON PRATT ROUND 15FR: Brand: CARDINAL HEALTH

## (undated) DEVICE — NDL HYPO ECLPS SFTY 22G 1 1/2IN

## (undated) DEVICE — SUT PROLN 7/0 BV1 D/A 24IN 8702H

## (undated) DEVICE — GLV SURG BIOGEL LTX PF 7 1/2

## (undated) DEVICE — TRAP FLD MINIVAC MEGADYNE 100ML

## (undated) DEVICE — DEV COMP RAD PRELUDESYNC 24CM

## (undated) DEVICE — PK VASC 10

## (undated) DEVICE — SUT SILK 2/0 PS 18IN 1588H

## (undated) DEVICE — 3M™ IOBAN™ 2 ANTIMICROBIAL INCISE DRAPE 6650EZ: Brand: IOBAN™ 2

## (undated) DEVICE — LEX NEURO ANGIOGRAPHY: Brand: MEDLINE INDUSTRIES, INC.

## (undated) DEVICE — CVR TRANSD FLX 3DIMEN 14X29.2CM LF STRL

## (undated) DEVICE — SPNG GZ WOVN 4X4IN 12PLY 10/BX STRL

## (undated) DEVICE — SUT SILK 4/0 TIES 18IN A183H

## (undated) DEVICE — RADIFOCUS GLIDEWIRE: Brand: GLIDEWIRE

## (undated) DEVICE — ROTATING HEMOSTATIC VALVE .096": Brand: RHV

## (undated) DEVICE — SUT PROLN SURGILENE 5.0 24IN BLU 9702H

## (undated) DEVICE — SUT SILK 3/0 TIES 18IN A184H